# Patient Record
Sex: FEMALE | Race: WHITE | NOT HISPANIC OR LATINO | Employment: FULL TIME | URBAN - METROPOLITAN AREA
[De-identification: names, ages, dates, MRNs, and addresses within clinical notes are randomized per-mention and may not be internally consistent; named-entity substitution may affect disease eponyms.]

---

## 2017-08-13 ENCOUNTER — OFFICE VISIT (OUTPATIENT)
Dept: URGENT CARE | Facility: CLINIC | Age: 42
End: 2017-08-13
Payer: MEDICAID

## 2017-08-13 VITALS
OXYGEN SATURATION: 98 % | WEIGHT: 210 LBS | DIASTOLIC BLOOD PRESSURE: 92 MMHG | TEMPERATURE: 97 F | RESPIRATION RATE: 16 BRPM | HEART RATE: 93 BPM | BODY MASS INDEX: 32.96 KG/M2 | HEIGHT: 67 IN | SYSTOLIC BLOOD PRESSURE: 133 MMHG

## 2017-08-13 DIAGNOSIS — N30.00 ACUTE CYSTITIS WITHOUT HEMATURIA: Primary | ICD-10-CM

## 2017-08-13 DIAGNOSIS — R30.0 DYSURIA: ICD-10-CM

## 2017-08-13 LAB
BILIRUB UR QL STRIP: NEGATIVE
GLUCOSE UR QL STRIP: NEGATIVE
KETONES UR QL STRIP: NEGATIVE
LEUKOCYTE ESTERASE UR QL STRIP: POSITIVE
PH, POC UA: ABNORMAL
POC BLOOD, URINE: NEGATIVE
POC NITRATES, URINE: NEGATIVE
PROT UR QL STRIP: NEGATIVE
SP GR UR STRIP: 1.02 (ref 1–1.03)
UROBILINOGEN UR STRIP-ACNC: ABNORMAL (ref 0.1–1.1)

## 2017-08-13 PROCEDURE — 3008F BODY MASS INDEX DOCD: CPT | Mod: S$GLB,,, | Performed by: INTERNAL MEDICINE

## 2017-08-13 PROCEDURE — 99203 OFFICE O/P NEW LOW 30 MIN: CPT | Mod: 25,S$GLB,, | Performed by: INTERNAL MEDICINE

## 2017-08-13 PROCEDURE — 81003 URINALYSIS AUTO W/O SCOPE: CPT | Mod: QW,S$GLB,, | Performed by: INTERNAL MEDICINE

## 2017-08-13 RX ORDER — SULFAMETHOXAZOLE AND TRIMETHOPRIM 800; 160 MG/1; MG/1
1 TABLET ORAL 2 TIMES DAILY
Qty: 14 TABLET | Refills: 0 | Status: SHIPPED | OUTPATIENT
Start: 2017-08-13 | End: 2017-08-20

## 2017-08-13 RX ORDER — PHENAZOPYRIDINE HYDROCHLORIDE 100 MG/1
100 TABLET, FILM COATED ORAL
Qty: 10 TABLET | Refills: 0 | Status: SHIPPED | OUTPATIENT
Start: 2017-08-13 | End: 2017-08-16

## 2017-08-13 NOTE — PROGRESS NOTES
"Subjective:       Patient ID: Imelda Kimble is a 41 y.o. female.    Vitals:  height is 5' 7" (1.702 m) and weight is 95.3 kg (210 lb). Her oral temperature is 97.3 °F (36.3 °C). Her blood pressure is 133/92 (abnormal) and her pulse is 93. Her respiration is 16 and oxygen saturation is 98%.     Chief Complaint: Dysuria    Dysuria    This is a recurrent problem. The current episode started today. The problem occurs every urination. The problem has been unchanged. The quality of the pain is described as burning. The pain is at a severity of 5/10. The pain is moderate. There has been no fever. She is sexually active. There is no history of pyelonephritis. Associated symptoms include flank pain, frequency and urgency. Pertinent negatives include no chills, hematuria, nausea or vomiting. She has tried nothing for the symptoms. The treatment provided no relief.     Review of Systems   Constitution: Negative for chills and fever.   Musculoskeletal: Negative for back pain.   Gastrointestinal: Negative for abdominal pain, nausea and vomiting.   Genitourinary: Positive for flank pain, frequency, pelvic pain and urgency. Negative for dysuria, genital sores, hematuria, missed menses and non-menstrual bleeding.       Objective:      Physical Exam   Constitutional: She is oriented to person, place, and time. She appears well-developed and well-nourished. She is cooperative.  Non-toxic appearance. She does not appear ill. No distress.   HENT:   Head: Normocephalic and atraumatic.   Right Ear: Hearing, tympanic membrane, external ear and ear canal normal.   Left Ear: Hearing, tympanic membrane, external ear and ear canal normal.   Nose: Nose normal. No mucosal edema, rhinorrhea or nasal deformity. No epistaxis. Right sinus exhibits no maxillary sinus tenderness and no frontal sinus tenderness. Left sinus exhibits no maxillary sinus tenderness and no frontal sinus tenderness.   Mouth/Throat: Uvula is midline, oropharynx is clear and " moist and mucous membranes are normal. No trismus in the jaw. Normal dentition. No uvula swelling. No posterior oropharyngeal erythema.   Eyes: Conjunctivae and lids are normal. Right eye exhibits no discharge. Left eye exhibits no discharge. No scleral icterus.   Sclera clear bilat   Neck: Trachea normal, normal range of motion, full passive range of motion without pain and phonation normal. Neck supple.   Cardiovascular: Normal rate, regular rhythm, normal heart sounds, intact distal pulses and normal pulses.    Pulmonary/Chest: Effort normal and breath sounds normal. No respiratory distress.   Abdominal: Soft. Normal appearance and bowel sounds are normal. She exhibits no distension, no pulsatile midline mass and no mass. There is tenderness. No hernia.       Musculoskeletal: Normal range of motion. She exhibits no edema or deformity.   Neurological: She is alert and oriented to person, place, and time. She exhibits normal muscle tone. Coordination normal.   Skin: Skin is warm, dry and intact. She is not diaphoretic. No pallor.   Psychiatric: She has a normal mood and affect. Her speech is normal and behavior is normal. Judgment and thought content normal. Cognition and memory are normal.   Nursing note and vitals reviewed.      Assessment:       1. Acute cystitis without hematuria    2. Dysuria        Plan:         Acute cystitis without hematuria  -     sulfamethoxazole-trimethoprim 800-160mg (BACTRIM DS) 800-160 mg Tab; Take 1 tablet by mouth 2 (two) times daily.  Dispense: 14 tablet; Refill: 0  -     phenazopyridine (PYRIDIUM) 100 MG tablet; Take 1 tablet (100 mg total) by mouth 3 (three) times daily with meals.  Dispense: 10 tablet; Refill: 0  -     POCT Urinalysis, Dipstick, Automated, W/O Scope    Dysuria  -     sulfamethoxazole-trimethoprim 800-160mg (BACTRIM DS) 800-160 mg Tab; Take 1 tablet by mouth 2 (two) times daily.  Dispense: 14 tablet; Refill: 0  -     phenazopyridine (PYRIDIUM) 100 MG tablet;  Take 1 tablet (100 mg total) by mouth 3 (three) times daily with meals.  Dispense: 10 tablet; Refill: 0  -     POCT Urinalysis, Dipstick, Automated, W/O Scope      Take meds

## 2017-08-13 NOTE — PATIENT INSTRUCTIONS
"  Bladder Infection, Female (Adult)    Urine is normally doesn't have any bacteria in it. But bacteria can get into the urinary tract from the skin around the rectum. Or they can travel in the blood from elsewhere in the body. Once they are in your urinary tract, they can cause infection in the urethra (urethritis), the bladder (cystitis), or the kidneys (pyelonephritis).  The most common place for an infection is in the bladder. This is called a bladder infection. This is one of the most common infections in women. Most bladder infections are easily treated. They are not serious unless the infection spreads to the kidney.  The phrases "bladder infection," "UTI," and "cystitis" are often used to describe the same thing. But they are not always the same. Cystitis is an inflammation of the bladder. The most common cause of cystitis is an infection.  Symptoms  The infection causes inflammation in the urethra and bladder. This causes many of the symptoms. The most common symptoms of a bladder infection are:  · Pain or burning when urinating  · Having to urinate more often than usual  · Urgent need to urinate  · Only a small amount of urine comes out  · Blood in urine  · Abdominal discomfort. This is usually in the lower abdomen above the pubic bone.  · Cloudy urine  · Strong- or bad-smelling urine  · Unable to urinate (urinary retention)  · Unable to hold urine in (urinary incontinence)  · Fever  · Loss of appetite  · Confusion (in older adults)  Causes  Bladder infections are not contagious. You can't get one from someone else, from a toilet seat, or from sharing a bath.  The most common cause of bladder infections is bacteria from the bowels. The bacteria get onto the skin around the opening of the urethra. From there, they can get into the urine and travel up to the bladder, causing inflammation and infection. This usually happens because of:  · Wiping improperly after urinating. Always wipe from front to " back.  · Bowel incontinence  · Pregnancy  · Procedures such as having a catheter inserted  · Older age  · Not emptying your bladder. This can allow bacteria a chance to grow in your urine.  · Dehydration  · Constipation  · Sex  · Use of a diaphragm for birth control   Treatment  Bladder infections are diagnosed by a urine test. They are treated with antibiotics and usually clear up quickly without complications. Treatment helps prevent a more serious kidney infection.  Medicines  Medicines can help in the treatment of a bladder infection:  · Take antibiotics until they are used up, even if you feel better. It is important to finish them to make sure the infection has cleared.  · You can use acetaminophen or ibuprofen for pain, fever, or discomfort, unless another medicine was prescribed. If you have chronic liver or kidney disease, talk with your healthcare provider before using these medicines. Also talk with your provider if you've ever had a stomach ulcer or gastrointestinal bleeding, or are taking blood-thinner medicines.  · If you are given phenazopydridine to reduce burning with urination, it will cause your urine to become a bright orange color. This can stain clothing.  Care and prevention  These self-care steps can help prevent future infections:  · Drink plenty of fluids to prevent dehydration and flush out your bladder. Do this unless you must restrict fluids for other health reasons, or your doctor told you not to.  · Proper cleaning after going to the bathroom is important. Wipe from front to back after using the toilet to prevent the spread of bacteria.  · Urinate more often. Don't try to hold urine in for a long time.  · Wear loose-fitting clothes and cotton underwear. Avoid tight-fitting pants.  · Improve your diet and prevent constipation. Eat more fresh fruit and vegetables, and fiber, and less junk and fatty foods.  · Avoid sex until your symptoms are gone.  · Avoid caffeine, alcohol, and spicy  foods. These can irritate your bladder.  · Urinate right after intercourse to flush out your bladder.  · If you use birth control pills and have frequent bladder infections, discuss it with your doctor.  Follow-up care  Call your healthcare provider if all symptoms are not gone after 3 days of treatment. This is especially important if you have repeat infections.  If a culture was done, you will be told if your treatment needs to be changed. If directed, you can call to find out the results.  If X-rays were done, you will be told if the results will affect your treatment.  Call 911  Call 911 if any of the following occur:  · Trouble breathing  · Hard to wake up or confusion  · Fainting or loss of consciousness  · Rapid heart rate  When to seek medical advice  Call your healthcare provider right away if any of these occur:  · Fever of 100.4ºF (38.0ºC) or higher, or as directed by your healthcare provider  · Symptoms are not better by the third day of treatment  · Back or belly (abdominal) pain that gets worse  · Repeated vomiting, or unable to keep medicine down  · Weakness or dizziness  · Vaginal discharge  · Pain, redness, or swelling in the outer vaginal area (labia)  Date Last Reviewed: 10/1/2016  © 5373-6075 [employer]. [address] [place] [address], [place] [address]. All rights reserved. This information is not intended as a substitute for professional medical care. Always follow your healthcare professional's instructions.  *Urinary Tract Infections*  1) No Tub Bathes.  2) Urinate after [place](Adults).  3)No Fizzy drinks/Soda drinks.  4)Wipe From front to Back.  5)Wear only Cotton Underwear.  6) Take the antibiotic you were given until it is all gone and drink a lot of fluids for 5 to 7 days to Flush Your Kidneys. Studies Show that cranberry Juice does not cure a UTI nor drinking it daily or taking Cranberry tablets will prevent a UTI.  Please return here or go to the Emergency Department for any  concerns or worsening of condition.  If you were prescribed antibiotics, please take them to completion.  If you were prescribed a narcotic medication, do not drive or operate heavy equipment or machinery while taking these medications.  Please follow up with your primary care doctor or specialist as needed.    If you  smoke, please stop smoking.    Cultured urine

## 2018-05-28 ENCOUNTER — OFFICE VISIT (OUTPATIENT)
Dept: URGENT CARE | Facility: CLINIC | Age: 43
End: 2018-05-28
Payer: MEDICAID

## 2018-05-28 VITALS
TEMPERATURE: 97 F | DIASTOLIC BLOOD PRESSURE: 95 MMHG | HEIGHT: 67 IN | HEART RATE: 78 BPM | WEIGHT: 224 LBS | BODY MASS INDEX: 35.16 KG/M2 | SYSTOLIC BLOOD PRESSURE: 148 MMHG | OXYGEN SATURATION: 100 %

## 2018-05-28 DIAGNOSIS — J01.00 ACUTE MAXILLARY SINUSITIS, RECURRENCE NOT SPECIFIED: Primary | ICD-10-CM

## 2018-05-28 PROCEDURE — 99214 OFFICE O/P EST MOD 30 MIN: CPT | Mod: S$GLB,,, | Performed by: PHYSICIAN ASSISTANT

## 2018-05-28 RX ORDER — DEXAMETHASONE SODIUM PHOSPHATE 100 MG/10ML
8 INJECTION INTRAMUSCULAR; INTRAVENOUS
Status: COMPLETED | OUTPATIENT
Start: 2018-05-28 | End: 2018-05-28

## 2018-05-28 RX ORDER — FLUTICASONE PROPIONATE 50 MCG
1 SPRAY, SUSPENSION (ML) NASAL DAILY
Qty: 16 G | Refills: 0 | Status: SHIPPED | OUTPATIENT
Start: 2018-05-28

## 2018-05-28 RX ORDER — AMOXICILLIN AND CLAVULANATE POTASSIUM 875; 125 MG/1; MG/1
1 TABLET, FILM COATED ORAL 2 TIMES DAILY
Qty: 20 TABLET | Refills: 0 | Status: SHIPPED | OUTPATIENT
Start: 2018-05-28 | End: 2018-06-07

## 2018-05-28 RX ORDER — PROMETHAZINE HYDROCHLORIDE AND PHENYLEPHRINE HYDROCHLORIDE 6.25; 5 MG/5ML; MG/5ML
5 SYRUP ORAL 4 TIMES DAILY PRN
Qty: 118 ML | Refills: 0 | Status: SHIPPED | OUTPATIENT
Start: 2018-05-28 | End: 2018-06-07

## 2018-05-28 RX ADMIN — DEXAMETHASONE SODIUM PHOSPHATE 8 MG: 100 INJECTION INTRAMUSCULAR; INTRAVENOUS at 11:05

## 2018-05-28 NOTE — PATIENT INSTRUCTIONS
· Follow up with your primary care in 2-5 days if symptoms have not improved, or you may return here.  · If you were referred to a specialist, please follow up with that specialty.  · If you were prescribed antibiotics, please take them to completion.  · If you were prescribed a narcotic or any medication with sedative effects, do not drive or operate heavy equipment or machinery while taking these medications.  · You must understand that you have received treatment at an Urgent Care facility only, and that you may be released before all of your medical problems are known or treated. Urgent Care facilities are not equipped to handle life threatening emergencies. It is recommended that you go to an Emergency Department for further evaluation of worsening or concerning symptoms, or possibly life threatening conditions as discussed.                                        If you  smoke, please stop smoking            Symptomatic treatment:    Alternate tylenol and motrin every 4-6 hours  salt water gargles  Cold-eeze helps to reduce the duration of sore throat symptoms  Cepachol helps to numb the discomfort  Chloroseptic spray  Nasal saline spray reduces inflammation and dryness  Warm face compresses as often as you can  Vicks vapor rub at night  Flonase OTC or Nasacort OTC  Simple foods like chicken noodle soup help  Mucinex DM or use Coricidin HBP if you have hypertension  Zyrtec/Claritin/Xyzal during the day and Benadryl at night may help if allergy component   Pedialyte helps with dehydration if lacking appetite  Rest as much as you can        Sinusitis (Antibiotic Treatment)    The sinuses are air-filled spaces within the bones of the face. They connect to the inside of the nose. Sinusitis is an inflammation of the tissue lining the sinus cavity. Sinus inflammation can occur during a cold. It can also be due to allergies to pollens and other particles in the air. Sinusitis can cause symptoms of sinus congestion and  fullness. A sinus infection causes fever, headache and facial pain. There is often green or yellow drainage from the nose or into the back of the throat (post-nasal drip). You have been given antibiotics to treat this condition.  Home care:  · Take the full course of antibiotics as instructed. Do not stop taking them, even if you feel better.  · Drink plenty of water, hot tea, and other liquids. This may help thin mucus. It also may promote sinus drainage.  · Heat may help soothe painful areas of the face. Use a towel soaked in hot water. Or,  the shower and direct the hot spray onto your face. Using a vaporizer along with a menthol rub at night may also help.   · An expectorant containing guaifenesin may help thin the mucus and promote drainage from the sinuses.  · Over-the-counter decongestants may be used unless a similar medicine was prescribed. Nasal sprays work the fastest. Use one that contains phenylephrine or oxymetazoline. First blow the nose gently. Then use the spray. Do not use these medicines more often than directed on the label or symptoms may get worse. You may also use tablets containing pseudoephedrine. Avoid products that combine ingredients, because side effects may be increased. Read labels. You can also ask the pharmacist for help. (NOTE: Persons with high blood pressure should not use decongestants. They can raise blood pressure.)  · Over-the-counter antihistamines may help if allergies contributed to your sinusitis.    · Do not use nasal rinses or irrigation during an acute sinus infection, unless told to by your health care provider. Rinsing may spread the infection to other sinuses.  · Use acetaminophen or ibuprofen to control pain, unless another pain medicine was prescribed. (If you have chronic liver or kidney disease or ever had a stomach ulcer, talk with your doctor before using these medicines. Aspirin should never be used in anyone under 18 years of age who is ill with a  fever. It may cause severe liver damage.)  · Don't smoke. This can worsen symptoms.  Follow-up care  Follow up with your healthcare provider or our staff if you are not improving within the next week.  When to seek medical advice  Call your healthcare provider if any of these occur:  · Facial pain or headache becoming more severe  · Stiff neck  · Unusual drowsiness or confusion  · Swelling of the forehead or eyelids  · Vision problems, including blurred or double vision  · Fever of 100.4ºF (38ºC) or higher, or as directed by your healthcare provider  · Seizure  · Breathing problems  · Symptoms not resolving within 10 days  Date Last Reviewed: 4/13/2015  © 8083-2441 [employer]. [address] [place] [address], [place] [address]. All rights reserved. This information is not intended as a substitute for professional medical care. Always follow your healthcare professional's instructions.

## 2018-05-28 NOTE — PROGRESS NOTES
"Subjective:       Patient ID: Imelda Kimble is a 42 y.o. female.    Vitals:  height is 5' 7" (1.702 m) and weight is 101.6 kg (224 lb). Her oral temperature is 97.1 °F (36.2 °C). Her blood pressure is 148/95 (abnormal) and her pulse is 78. Her oxygen saturation is 100%.     Chief Complaint: Sinus Problem    Sinus Problem   This is a new problem. Episode onset: 5 days ago. The problem has been waxing and waning since onset. There has been no fever. Her pain is at a severity of 5/10 (left ear pain ). The pain is mild. Associated symptoms include ear pain, headaches and sinus pressure. Pertinent negatives include no chills, congestion, coughing, hoarse voice, shortness of breath or sore throat. Treatments tried: allergy medicine, ibuprofen. The treatment provided no relief.     Review of Systems   Constitution: Negative for chills, fever and malaise/fatigue.   HENT: Positive for ear pain and sinus pressure. Negative for congestion, hoarse voice and sore throat.    Eyes: Negative for discharge and redness.   Cardiovascular: Negative for chest pain, dyspnea on exertion and leg swelling.   Respiratory: Negative for cough, shortness of breath, sputum production and wheezing.    Musculoskeletal: Negative for myalgias.   Gastrointestinal: Negative for abdominal pain and nausea.   Neurological: Positive for headaches.       Objective:      Physical Exam   Constitutional: She is oriented to person, place, and time. She appears well-developed and well-nourished. She is cooperative.  Non-toxic appearance. She does not appear ill. No distress.   HENT:   Head: Normocephalic and atraumatic.   Right Ear: Hearing, external ear and ear canal normal. A middle ear effusion (serous) is present.   Left Ear: Hearing, external ear and ear canal normal. A middle ear effusion (serous) is present.   Nose: Mucosal edema present. No rhinorrhea or nasal deformity. No epistaxis. Right sinus exhibits no maxillary sinus tenderness and no frontal " sinus tenderness. Left sinus exhibits maxillary sinus tenderness. Left sinus exhibits no frontal sinus tenderness.   Mouth/Throat: Uvula is midline, oropharynx is clear and moist and mucous membranes are normal. No trismus in the jaw. Normal dentition. No uvula swelling. No oropharyngeal exudate, posterior oropharyngeal edema or posterior oropharyngeal erythema.   Eyes: Conjunctivae and lids are normal. No scleral icterus.   Sclera clear bilat   Neck: Trachea normal, full passive range of motion without pain and phonation normal. Neck supple.   Cardiovascular: Normal rate, regular rhythm, normal heart sounds, intact distal pulses and normal pulses.    Pulmonary/Chest: Effort normal and breath sounds normal. No respiratory distress. She has no decreased breath sounds. She has no wheezes. She has no rhonchi. She has no rales.   Abdominal: Soft. Normal appearance and bowel sounds are normal. She exhibits no distension. There is no tenderness.   Musculoskeletal: Normal range of motion. She exhibits no edema or deformity.   Neurological: She is alert and oriented to person, place, and time. She exhibits normal muscle tone. Coordination normal.   Skin: Skin is warm, dry and intact. She is not diaphoretic. No pallor.   Psychiatric: She has a normal mood and affect. Her speech is normal and behavior is normal. Judgment and thought content normal. Cognition and memory are normal.   Nursing note and vitals reviewed.      Assessment:       1. Acute maxillary sinusitis, recurrence not specified        Plan:         Acute maxillary sinusitis, recurrence not specified  -     amoxicillin-clavulanate 875-125mg (AUGMENTIN) 875-125 mg per tablet; Take 1 tablet by mouth 2 (two) times daily.  Dispense: 20 tablet; Refill: 0  -     dexamethasone injection 8 mg; Inject 0.8 mLs (8 mg total) into the muscle one time.  -     fluticasone (FLONASE) 50 mcg/actuation nasal spray; 1 spray (50 mcg total) by Each Nare route once daily.  Dispense:  16 g; Refill: 0  -     promethazine-phenylephrine (PROMETHAZINE VC) 6.25-5 mg/5 mL Syrp; Take 5 mLs by mouth 4 (four) times daily as needed.  Dispense: 118 mL; Refill: 0      Patient Instructions   · Follow up with your primary care in 2-5 days if symptoms have not improved, or you may return here.  · If you were referred to a specialist, please follow up with that specialty.  · If you were prescribed antibiotics, please take them to completion.  · If you were prescribed a narcotic or any medication with sedative effects, do not drive or operate heavy equipment or machinery while taking these medications.  · You must understand that you have received treatment at an Urgent Care facility only, and that you may be released before all of your medical problems are known or treated. Urgent Care facilities are not equipped to handle life threatening emergencies. It is recommended that you go to an Emergency Department for further evaluation of worsening or concerning symptoms, or possibly life threatening conditions as discussed.                                        If you  smoke, please stop smoking            Symptomatic treatment:    Alternate tylenol and motrin every 4-6 hours  salt water gargles  Cold-eeze helps to reduce the duration of sore throat symptoms  Cepachol helps to numb the discomfort  Chloroseptic spray  Nasal saline spray reduces inflammation and dryness  Warm face compresses as often as you can  Vicks vapor rub at night  Flonase OTC or Nasacort OTC  Simple foods like chicken noodle soup help  Mucinex DM or use Coricidin HBP if you have hypertension  Zyrtec/Claritin/Xyzal during the day and Benadryl at night may help if allergy component   Pedialyte helps with dehydration if lacking appetite  Rest as much as you can        Sinusitis (Antibiotic Treatment)    The sinuses are air-filled spaces within the bones of the face. They connect to the inside of the nose. Sinusitis is an inflammation of the  tissue lining the sinus cavity. Sinus inflammation can occur during a cold. It can also be due to allergies to pollens and other particles in the air. Sinusitis can cause symptoms of sinus congestion and fullness. A sinus infection causes fever, headache and facial pain. There is often green or yellow drainage from the nose or into the back of the throat (post-nasal drip). You have been given antibiotics to treat this condition.  Home care:  · Take the full course of antibiotics as instructed. Do not stop taking them, even if you feel better.  · Drink plenty of water, hot tea, and other liquids. This may help thin mucus. It also may promote sinus drainage.  · Heat may help soothe painful areas of the face. Use a towel soaked in hot water. Or,  the shower and direct the hot spray onto your face. Using a vaporizer along with a menthol rub at night may also help.   · An expectorant containing guaifenesin may help thin the mucus and promote drainage from the sinuses.  · Over-the-counter decongestants may be used unless a similar medicine was prescribed. Nasal sprays work the fastest. Use one that contains phenylephrine or oxymetazoline. First blow the nose gently. Then use the spray. Do not use these medicines more often than directed on the label or symptoms may get worse. You may also use tablets containing pseudoephedrine. Avoid products that combine ingredients, because side effects may be increased. Read labels. You can also ask the pharmacist for help. (NOTE: Persons with high blood pressure should not use decongestants. They can raise blood pressure.)  · Over-the-counter antihistamines may help if allergies contributed to your sinusitis.    · Do not use nasal rinses or irrigation during an acute sinus infection, unless told to by your health care provider. Rinsing may spread the infection to other sinuses.  · Use acetaminophen or ibuprofen to control pain, unless another pain medicine was prescribed. (If  you have chronic liver or kidney disease or ever had a stomach ulcer, talk with your doctor before using these medicines. Aspirin should never be used in anyone under 18 years of age who is ill with a fever. It may cause severe liver damage.)  · Don't smoke. This can worsen symptoms.  Follow-up care  Follow up with your healthcare provider or our staff if you are not improving within the next week.  When to seek medical advice  Call your healthcare provider if any of these occur:  · Facial pain or headache becoming more severe  · Stiff neck  · Unusual drowsiness or confusion  · Swelling of the forehead or eyelids  · Vision problems, including blurred or double vision  · Fever of 100.4ºF (38ºC) or higher, or as directed by your healthcare provider  · Seizure  · Breathing problems  · Symptoms not resolving within 10 days  Date Last Reviewed: 4/13/2015  © 2962-5326 [employer] [address] [place] [address], [place] [address]. All rights reserved. This information is not intended as a substitute for professional medical care. Always follow your healthcare professional's instructions.

## 2018-06-12 ENCOUNTER — OFFICE VISIT (OUTPATIENT)
Dept: URGENT CARE | Facility: CLINIC | Age: 43
End: 2018-06-12
Payer: MEDICAID

## 2018-06-12 VITALS
RESPIRATION RATE: 20 BRPM | WEIGHT: 222 LBS | HEIGHT: 67 IN | TEMPERATURE: 99 F | BODY MASS INDEX: 34.84 KG/M2 | DIASTOLIC BLOOD PRESSURE: 101 MMHG | HEART RATE: 82 BPM | SYSTOLIC BLOOD PRESSURE: 145 MMHG | OXYGEN SATURATION: 99 %

## 2018-06-12 DIAGNOSIS — H65.92 LEFT OTITIS MEDIA WITH EFFUSION: Primary | ICD-10-CM

## 2018-06-12 DIAGNOSIS — J01.10 ACUTE FRONTAL SINUSITIS, RECURRENCE NOT SPECIFIED: ICD-10-CM

## 2018-06-12 PROCEDURE — 99213 OFFICE O/P EST LOW 20 MIN: CPT | Mod: S$GLB,,, | Performed by: NURSE PRACTITIONER

## 2018-06-12 RX ORDER — CEFDINIR 300 MG/1
300 CAPSULE ORAL EVERY 12 HOURS
Qty: 10 CAPSULE | Refills: 0 | Status: SHIPPED | OUTPATIENT
Start: 2018-06-12 | End: 2018-06-17

## 2018-06-12 NOTE — PATIENT INSTRUCTIONS
Sinus Headache    The sinuses are air-filled spaces within the bones of the face. They connect to the inside of the nose. Sinusitis is an inflammation of the tissue lining the sinus cavity. Sinus inflammation can occur during a cold or hay fever (allergies to pollens and other particles in the air) and cause symptoms of sinus congestion and fullness and perhaps a low-grade fever. An infection is usually present when there is also facial pain or headache and green or yellow drainage from the nose or into the back of the throat (postnasal drip). Antibiotics are often prescribed to treat this condition.  Sinus headache may cause pain in different places, depending on which sinuses are infected. There may be pain in the temples, forehead, top of the head, behind or around the eye, across the cheekbone, or into the upper teeth.  You may find that changing your position, sitting upright or lying down, will bring some relief.  Home care  The following guidelines will help you care for yourself at home:  · Drink plenty of water, hot tea, and other liquids to stay well hydrated. This thins the mucus and helps your sinuses drain.  · Apply heat to the painful areas of the face. Use a towel soaked in hot water. Or  the shower with the hot spray on your face. This is a good way to inhale warm water vapor and get heat on your face at the same time. Cover your mouth and nose with your hands so you can still breathe as you do this.  · Use a cool mist vaporizer at night. Suck on peppermint, menthol, or eucalyptus hard candies during the day.  · An expectorant containing guaifenesin helps thin the mucus. It also helps your sinuses drain.  · You may use over-the-counter decongestants unless a similar medicine was prescribed. Nasal sprays or drops work the fastest. Use one that contains phenylephrine or oxymetazoline. First blow your nose gently to remove mucus. Then apply the spray or drops. Don't use decongestant nasal  sprays or drops more often than the label says or for more than 3 days. This can make symptoms worse. Nasal sprays or drops prescribed by your doctor typically do not have these limits. Check with your doctor or pharmacist. You may also use oral tablets containing pseudoephedrine. Side effects from oral decongestants tend to be worse than with nasal sprays or drops, and may keep you from using them. Many sinus remedies combine ingredients, which may increase side effects. Also, if you are taking a combination medicine with another medicine, be sure you are not taking a double dose of anything by mistake. Read the labels or ask the pharmacist for help. Talk with your doctor before using decongestants if you have high blood pressure, heart disease, glaucoma, or prostate trouble.  · Antihistamines may help if allergies are causing your sinusitis. You can get chlorpheniramine and diphenhydramine over the counter, but these can cause drowsiness. Don't use these if you have glaucoma or if you are a man with trouble urinating due to an enlarged prostate. Over-the-counter antihistamines containing loratidine and cetirizine cause less drowsiness and may be a better choice for daytime use.  · When allergies cause your sinusitis, a saline nasal rinse may give relief. A saline nasal rinse reduces swelling and clears excess mucus. This allows sinuses to drain. Prepackaged kits are available at most drugstores. These contain premixed salt packets and an irrigation device. If antibiotics have been prescribed to treat an acute sinus infection, talk with your doctor before using a nasal rinse to be sure it is safe for you.  · You may use over-the-counter medicine to control pain and fever, unless another pain medicine was prescribed. Talk with your doctor before using acetaminophen or ibuprofen if you have chronic liver or kidney disease. Also talk with your doctor if you have ever had a stomach ulcer. Aspirin should never be used  in anyone under 18 years of age who has a fever. It may cause a life-threatening condition called Reye syndrome.  · If antibiotics were given, finish all of them, even if you are feeling better after a few days.  Follow-up care  Follow up with your healthcare provider, or as advised if your symptoms aren't better in 1 week.  Call 911  Call 911 if any of these occur:  · Unusual drowsiness or confusion  · Swelling of the forehead or eyelids  · Vision problems including blurred or double vision  · Seizure  When to seek medical advice  Call your healthcare provider right away if any of these occur:  · Facial pain or headache becomes more severe  · Stiff neck  · Fever over 100.4º F (38.0º C) for more than 3 days on antibiotics  · Bleeding from the nose or throat  Date Last Reviewed: 10/1/2016  © 2025-6803 [employer]. [address] [place] [address]. All rights reserved. This information is not intended as a substitute for professional medical care. Always follow your healthcare professional's instructions.      Upper Respiratory Infections    The following information is provided to help you in treating upper respiratory infections.    Decongestant Nasal Sprays  Over-the-counter decongestant nasal spray such as Afrin, may be helpful as an initial step in treating upper respiratory infections. This spray can be used for up to approximately 3 to 5 days and is used no more than twice per day. Topical nasal decongestant spray for longer than 5 days will result in a physical addiction, in which the nasal lining will become significantly swollen and irritated until the spray is used again.     Nasal Saline  Nasal saline is available over the counter. There are several different commercial preparations such as Ocean spray and Ayr spray. There is no limit on the use of Nasal saline. Saline is used by snorting the mist up into the nose then later gently blowing the nose to get rid of any secretions that  it has loosened.    Nasal Steroids  Nasal steroid medications such as Flonase are useful for upper respiratory infections, allergies, and sensitivities to airborne irritants. Unfortunately, they do not begin to work for 1-2 days, and they do not reach their maximum benefit for approximately 2-3 weeks. Initial therapy is typically 2 puffs per nostril twice per day. This should be used for only a few days, then the maintenance dosage is one or two puffs per nostril once per day. This can be done at any time of the day. The most effective way to use any nasal medication is to look down at your toes when spraying it in. Aim slightly away from the septum (dividing plate between the nostrils), and gently inhale. This ensures that the spray will go into the sinus cavities and not straight up into the nose. A good way to avoid spraying onto the septum is to use the right hand to spray into the left nostril, and vice versa for the right nostril. Occasionally, nasal steroids can increase the risk of nosebleeds, but in general they are very well tolerated and effective medications.    Antihistamines  Antihistamines are available both over-the-counter and as a prescription. There are also various decongestant and antihistamine combinations available such as Claritin, Allegra, and Zyrtec. It is best to take any antihistamine-decongestant combination in the morning to avoid insomnia. Zyrtec should probably be taken at night, in order to reduce the chance of sleepiness during the daytime. If there is a significant infection present and secretions are already thickened, it is recommended to discontinue antihistamines and use a mucous thinner/decongestant combination.    Mucous Thinners and Decongestants  Mucous thinners and decongestants are used to shrink down the tissues and promote sinus drainage. There are multiple prescription and over-the-counter varieties available. A mucous thinner will tend to be drying unless you are also  drinking plenty of water when taking these. If you have high blood pressure, it is very important to monitor your pressure while on decongestants. The mucous thinner/decongestant combinations are typically given twice per day. However, some people will be unable to tolerate these at night and should only take them once per day.  Oral Steroids  Oral steroids can be used with more sever infections. Often, they are the only medications that will reduce the symptoms of pressure and allow the nasal sinuses to drain. These are best taken on a full stomach and earlier in the day is better. They may give you some irritability, stomach upset, or hyperactivity. This can also interfere with sleep. A person who has high blood pressure or diabetes needs to be very careful to monitor their pressure or blood glucose while taking steroids. Steroids can have multiple side effects when taken long-term, but short-term doses are very well tolerated and extremely effective in controlling the symptoms associated with acute and chronic sinus infections, severe allergies, or nasal polyps. The only significant side effect of note with oral steroids is the extraordinarily uncommon occurrence of damage to the hip cartilage, which is very rare and is usually associated with long-term usage of steroids. The use of steroids for greater than approximately seven days requires a tapering down in order to discontinue them. You should not abruptly stop your steroid if you have been taking the same dose for greater than one week.    Antibiotic Treatment  Finally, when all of these other measures have failed, and a bacterial infection is present, an antibiotic will be prescribed. The most common symptoms of acute sinusitis of a bacterial nature are pain, pressure, and thick and colored nasal drainage. However, not all colored drainage means that there is a bacterial infection present. According to the Center for Disease Control, only 2% of colds will  progress to result in bacterial sinusitis. Most upper respiratory infections should NOT be treated with antibiotics. Antibiotics should be reserved for upper respiratory infections which last longer than 10 days, or which worsen after 4 or 5 days of treatment. The use of antibiotics for nonbacterial upper respiratory infections has resulted in a severe problem with the emergence of bacteria which are resistant to multiple forms of antibiotics, and some bacteria are currently only treatable with intravenous antibiotics.    Body Aches/Pains/Fever  Motrin/advil/ibuprofen- Take Two to Three Tablets(200 mg) three Times a Day for 5 to 7 Days.    Drink Hot Liquids(coffee,WATER,Tea,Hot Chocolate,or Soup) that you put in a Mug place in Microwave for 2.5 to 3 minutes CHANGE THE CUP THAT WAS USED IN THE MICROWAVE SO AS NOT TO BURN YOUR MOUTH,then sniff the steam from the cup and sip the heated liquid TEN TO TWELVE TIMES A DAY for 5 to 7 Days.

## 2018-06-12 NOTE — PROGRESS NOTES
"Subjective:       Patient ID: Imelda Kimble is a 42 y.o. female.    Vitals:  height is 5' 7" (1.702 m) and weight is 100.7 kg (222 lb). Her oral temperature is 98.6 °F (37 °C). Her blood pressure is 145/101 (abnormal) and her pulse is 82. Her respiration is 20 and oxygen saturation is 99%.     Chief Complaint: Otalgia    Patient presents with complaints of left ear pain. Pain is described as pressure.  Treated [facility] May 28th for Sinus infection at which time symptoms improved but continues with pressure and pain in left ear. She currently does not have congestion or rhinorrhea. She does have associated left frontal sinus pressure. Current meds include Flonase and zyrtec daily. Reports chronic sinus problems in the past with previous surgery.  States these symptoms are similar to previous symptoms prior to sinus surgery.       Otalgia    There is pain in the left ear. This is a new problem. The current episode started in the past 7 days. The problem occurs constantly. The problem has been gradually worsening. There has been no fever. The pain is at a severity of 7/10. The pain is moderate. Pertinent negatives include no abdominal pain, coughing, headaches or sore throat. She has tried acetaminophen (advil) for the symptoms. The treatment provided mild relief.     Review of Systems   Constitution: Negative for chills, fever and malaise/fatigue.   HENT: Positive for ear pain. Negative for congestion, hoarse voice and sore throat.    Eyes: Negative for discharge and redness.   Cardiovascular: Negative for chest pain, dyspnea on exertion and leg swelling.   Respiratory: Negative for cough, shortness of breath, sputum production and wheezing.    Musculoskeletal: Negative for myalgias.   Gastrointestinal: Negative for abdominal pain and nausea.   Neurological: Negative for headaches.       Objective:      Physical Exam   Constitutional: She is oriented to person, place, and time. She appears well-developed and " well-nourished. She is cooperative.  Non-toxic appearance. She does not appear ill. No distress.   HENT:   Head: Normocephalic and atraumatic.   Right Ear: Hearing, tympanic membrane and ear canal normal.   Left Ear: Hearing and ear canal normal. A middle ear effusion is present.   Nose: Nose normal. No mucosal edema, rhinorrhea or nasal deformity. No epistaxis. Right sinus exhibits no maxillary sinus tenderness and no frontal sinus tenderness. Left sinus exhibits no maxillary sinus tenderness and no frontal sinus tenderness.   Mouth/Throat: Uvula is midline, oropharynx is clear and moist and mucous membranes are normal. No trismus in the jaw. Normal dentition. No uvula swelling. No posterior oropharyngeal erythema.   Eyes: Conjunctivae and lids are normal. No scleral icterus.   Sclera clear bilat   Neck: Trachea normal, full passive range of motion without pain and phonation normal. Neck supple.   Cardiovascular: Normal rate, regular rhythm, normal heart sounds, intact distal pulses and normal pulses.    Pulmonary/Chest: Effort normal and breath sounds normal. No respiratory distress.   Abdominal: Soft. Normal appearance and bowel sounds are normal. She exhibits no distension. There is no tenderness.   Musculoskeletal: Normal range of motion. She exhibits no edema or deformity.   Neurological: She is alert and oriented to person, place, and time. She exhibits normal muscle tone. Coordination normal.   Skin: Skin is warm, dry and intact. She is not diaphoretic. No pallor.   Psychiatric: She has a normal mood and affect. Her speech is normal and behavior is normal. Judgment and thought content normal. Cognition and memory are normal.   Nursing note and vitals reviewed.      Assessment:       1. Left otitis media with effusion    2. Acute frontal sinusitis, recurrence not specified        Plan:         Left otitis media with effusion  -     cefdinir (OMNICEF) 300 MG capsule; Take 1 capsule (300 mg total) by mouth  every 12 (twelve) hours.  Dispense: 10 capsule; Refill: 0    Acute frontal sinusitis, recurrence not specified  -     cefdinir (OMNICEF) 300 MG capsule; Take 1 capsule (300 mg total) by mouth every 12 (twelve) hours.  Dispense: 10 capsule; Refill: 0    Continue Flonase  OTC Sudafed   Symptomatic Care    Delayed antibiotic prescribing.  Prescription printed.  Pt will begin antibiotic ONLY if symptoms progressively worsen or no improvement.

## 2018-06-16 ENCOUNTER — TELEPHONE (OUTPATIENT)
Dept: URGENT CARE | Facility: CLINIC | Age: 43
End: 2018-06-16

## 2018-09-27 ENCOUNTER — OFFICE VISIT (OUTPATIENT)
Dept: URGENT CARE | Facility: CLINIC | Age: 43
End: 2018-09-27
Payer: MEDICAID

## 2018-09-27 VITALS
OXYGEN SATURATION: 100 % | DIASTOLIC BLOOD PRESSURE: 91 MMHG | TEMPERATURE: 98 F | WEIGHT: 212 LBS | HEIGHT: 67 IN | SYSTOLIC BLOOD PRESSURE: 130 MMHG | RESPIRATION RATE: 18 BRPM | BODY MASS INDEX: 33.27 KG/M2 | HEART RATE: 85 BPM

## 2018-09-27 DIAGNOSIS — J32.9 SINUSITIS, UNSPECIFIED CHRONICITY, UNSPECIFIED LOCATION: Primary | ICD-10-CM

## 2018-09-27 PROCEDURE — 99214 OFFICE O/P EST MOD 30 MIN: CPT | Mod: S$GLB,,, | Performed by: PHYSICIAN ASSISTANT

## 2018-09-27 RX ORDER — CEFDINIR 300 MG/1
300 CAPSULE ORAL 2 TIMES DAILY
Qty: 20 CAPSULE | Refills: 0 | Status: SHIPPED | OUTPATIENT
Start: 2018-09-27 | End: 2018-10-07

## 2018-09-27 RX ORDER — BROMPHENIRAMINE MALEATE, PSEUDOEPHEDRINE HYDROCHLORIDE, AND DEXTROMETHORPHAN HYDROBROMIDE 2; 30; 10 MG/5ML; MG/5ML; MG/5ML
5 SYRUP ORAL EVERY 6 HOURS PRN
Qty: 118 ML | Refills: 0 | Status: SHIPPED | OUTPATIENT
Start: 2018-09-27 | End: 2018-10-07

## 2018-09-27 RX ORDER — DEXAMETHASONE SODIUM PHOSPHATE 100 MG/10ML
7 INJECTION INTRAMUSCULAR; INTRAVENOUS
Status: COMPLETED | OUTPATIENT
Start: 2018-09-27 | End: 2018-09-27

## 2018-09-27 RX ORDER — CETIRIZINE HYDROCHLORIDE 10 MG/1
10 TABLET ORAL DAILY PRN
COMMUNITY

## 2018-09-27 RX ORDER — ACETAMINOPHEN 500 MG
5000 TABLET ORAL DAILY
COMMUNITY

## 2018-09-27 RX ADMIN — DEXAMETHASONE SODIUM PHOSPHATE 7 MG: 100 INJECTION INTRAMUSCULAR; INTRAVENOUS at 06:09

## 2018-09-27 NOTE — PROGRESS NOTES
"Subjective:       Patient ID: Imelda Kimble is a 42 y.o. female.    Vitals:  height is 5' 7" (1.702 m) and weight is 96.2 kg (212 lb). Her oral temperature is 97.6 °F (36.4 °C). Her blood pressure is 130/91 (abnormal) and her pulse is 85. Her respiration is 18 and oxygen saturation is 100%.     Chief Complaint: Sinus Problem    Sinus Problem   This is a new problem. Episode onset: 1 month. The problem has been gradually worsening since onset. There has been no fever. Her pain is at a severity of 8/10. The pain is moderate. Associated symptoms include coughing, ear pain, headaches, shortness of breath, sinus pressure and sneezing. Pertinent negatives include no chills, congestion, hoarse voice, neck pain or sore throat. Treatments tried: Sudafed. The treatment provided mild relief.     Review of Systems   Constitution: Negative for chills, fever and malaise/fatigue.   HENT: Positive for ear pain, sinus pressure and sneezing. Negative for congestion, hoarse voice and sore throat.    Eyes: Negative for discharge and redness.   Cardiovascular: Negative for chest pain, dyspnea on exertion and leg swelling.   Respiratory: Positive for cough and shortness of breath. Negative for sputum production and wheezing.    Musculoskeletal: Negative for myalgias and neck pain.   Gastrointestinal: Negative for abdominal pain and nausea.   Neurological: Positive for headaches.       Objective:      Physical Exam   Constitutional: She is oriented to person, place, and time. She appears well-developed and well-nourished. She is cooperative.  Non-toxic appearance. She does not appear ill. No distress.   HENT:   Head: Normocephalic and atraumatic.   Right Ear: Hearing, external ear and ear canal normal. Tympanic membrane is not erythematous. A middle ear effusion (serous) is present.   Left Ear: Hearing, external ear and ear canal normal. Tympanic membrane is not erythematous. A middle ear effusion (serous) is present.   Nose: Mucosal edema " (mucoid discharge noted) present. No rhinorrhea or nasal deformity. No epistaxis. Right sinus exhibits no maxillary sinus tenderness and no frontal sinus tenderness. Left sinus exhibits no maxillary sinus tenderness and no frontal sinus tenderness.   Mouth/Throat: Uvula is midline, oropharynx is clear and moist and mucous membranes are normal. No trismus in the jaw. Normal dentition. No uvula swelling. No oropharyngeal exudate, posterior oropharyngeal edema or posterior oropharyngeal erythema (pnd present).   Eyes: Conjunctivae and lids are normal. No scleral icterus.   Sclera clear bilat   Neck: Trachea normal, full passive range of motion without pain and phonation normal. Neck supple.   Cardiovascular: Normal rate, regular rhythm, normal heart sounds, intact distal pulses and normal pulses.   Pulmonary/Chest: Effort normal and breath sounds normal. No respiratory distress. She has no decreased breath sounds. She has no wheezes.   Abdominal: Soft. Normal appearance and bowel sounds are normal. She exhibits no distension. There is no tenderness.   Musculoskeletal: Normal range of motion. She exhibits no edema or deformity.   Lymphadenopathy:     She has no cervical adenopathy.   Neurological: She is alert and oriented to person, place, and time. She exhibits normal muscle tone. Coordination normal.   Skin: Skin is warm, dry and intact. She is not diaphoretic. No pallor.   Psychiatric: She has a normal mood and affect. Her speech is normal and behavior is normal. Judgment and thought content normal. Cognition and memory are normal.   Nursing note and vitals reviewed.      Assessment:       1. Sinusitis, unspecified chronicity, unspecified location        Plan:         Sinusitis, unspecified chronicity, unspecified location  -     cefdinir (OMNICEF) 300 MG capsule; Take 1 capsule (300 mg total) by mouth 2 (two) times daily. for 10 days  Dispense: 20 capsule; Refill: 0  -     dexamethasone injection 7 mg; Inject 0.7  mLs (7 mg total) into the muscle one time.  -     brompheniramine-pseudoeph-DM (BROMFED DM) 2-30-10 mg/5 mL Syrp; Take 5 mLs by mouth every 6 (six) hours as needed.  Dispense: 118 mL; Refill: 0      Patient Instructions   · Follow up with your primary care in 2-5 days if symptoms have not improved, or you may return here.  · If you were referred to a specialist, please follow up with that specialty.  · If you were prescribed antibiotics, please take them to completion.  · If you were prescribed a narcotic or any medication with sedative effects, do not drive or operate heavy equipment or machinery while taking these medications.  · You must understand that you have received treatment at an Urgent Care facility only, and that you may be released before all of your medical problems are known or treated. Urgent Care facilities are not equipped to handle life threatening emergencies. It is recommended that you go to an Emergency Department for further evaluation of worsening or concerning symptoms, or possibly life threatening conditions as discussed.                                        If you  smoke, please stop smoking      You have been given a prescription for antibiotics. Your symptoms will likely resolve without antibiotics. It is recommended that you monitor yourself closely for 3-5 days and fill this prescription and start the antibiotic only if signs of infection, as discussed at your visit, are present. It is important to follow these instructions due to the problem of increasing antibiotic resistance.    Symptomatic treatment:    Alternate tylenol and motrin every 4-6 hours  salt water gargles  Cold-eeze helps to reduce the duration of sore throat symptoms  Cepachol helps to numb the discomfort  Chloroseptic spray  Nasal saline spray reduces inflammation and dryness  Warm face compresses as often as you can  Vicks vapor rub at night  Flonase OTC or Nasacort OTC  Simple foods like chicken noodle soup  help  Pedialyte helps with dehydration if lacking appetite  Rest as much as you can      Sinusitis (No Antibiotics)    The sinuses are air-filled spaces within the bones of the face. They connect to the inside of the nose. Sinusitis is an inflammation of the tissue lining the sinus cavity. Sinus inflammation can occur during a cold. It can also be due to allergies to pollens and other particles in the air. It can cause symptoms such as sinus congestion, headache, sore throat, facial swelling and fullness. It may also cause a low-grade fever. No infection is present, and no antibiotic treatment is needed.  Home care  · Drink plenty of water, hot tea, and other liquids. This may help thin mucus. It also may promote sinus drainage.  · Heat may help soothe painful areas of the face. Use a towel soaked in hot water. Or,  the shower and direct the hot spray onto your face. Using a vaporizer along with a menthol rub at night may also help.   · An expectorant containing guaifenesin may help thin the mucus and promote drainage from the sinuses.  · Over-the-counter decongestants may be used unless a similar medicine was prescribed. Nasal sprays work the fastest. Use one that contains phenylephrine or oxymetazoline. First blow the nose gently. Then use the spray. Do not use these medicines more often than directed on the label or symptoms may get worse. You may also use tablets containing pseudoephedrine. Avoid products that combine ingredients, because side effects may be increased. Read labels. You can also ask the pharmacist for help. (NOTE: Persons with high blood pressure should not use decongestants. They can raise blood pressure.)  · Over-the-counter antihistamines may help if allergies contributed to your sinusitis.    · Use acetaminophen or ibuprofen to control pain, unless another pain medicine was prescribed. (If you have chronic liver or kidney disease or ever had a stomach ulcer, talk with your doctor  before using these medicines. Aspirin should never be used in anyone under 18 years of age who is ill with a fever. It may cause severe liver damage.)  · Use nasal rinses or irrigation as instructed by your health care provider.  · Don't smoke. This can worsen symptoms.  Follow-up care  Follow up with your healthcare provider or our staff if you are not improving within the next week.  When to seek medical advice  Call your healthcare provider if any of these occur:  · Green or yellow discharge from the nose or into the throat  · Facial pain or headache becoming more severe  · Stiff neck  · Unusual drowsiness or confusion  · Swelling of the forehead or eyelids  · Vision problems, including blurred or double vision  · Fever of 100.4ºF (38ºC) or higher, or as directed by your healthcare provider  · Seizure  · Breathing problems  · Symptoms not resolving within 10 days  Date Last Reviewed: 4/13/2015  © 5637-9787 [employer]. [address] [place] [address], [place] [address]. All rights reserved. This information is not intended as a substitute for professional medical care. Always follow your healthcare professional's instructions.

## 2018-09-27 NOTE — PATIENT INSTRUCTIONS
· Follow up with your primary care in 2-5 days if symptoms have not improved, or you may return here.  · If you were referred to a specialist, please follow up with that specialty.  · If you were prescribed antibiotics, please take them to completion.  · If you were prescribed a narcotic or any medication with sedative effects, do not drive or operate heavy equipment or machinery while taking these medications.  · You must understand that you have received treatment at an Urgent Care facility only, and that you may be released before all of your medical problems are known or treated. Urgent Care facilities are not equipped to handle life threatening emergencies. It is recommended that you go to an Emergency Department for further evaluation of worsening or concerning symptoms, or possibly life threatening conditions as discussed.                                        If you  smoke, please stop smoking      You have been given a prescription for antibiotics. Your symptoms will likely resolve without antibiotics. It is recommended that you monitor yourself closely for 3-5 days and fill this prescription and start the antibiotic only if signs of infection, as discussed at your visit, are present. It is important to follow these instructions due to the problem of increasing antibiotic resistance.    Symptomatic treatment:    Alternate tylenol and motrin every 4-6 hours  salt water gargles  Cold-eeze helps to reduce the duration of sore throat symptoms  Cepachol helps to numb the discomfort  Chloroseptic spray  Nasal saline spray reduces inflammation and dryness  Warm face compresses as often as you can  Vicks vapor rub at night  Flonase OTC or Nasacort OTC  Simple foods like chicken noodle soup help  Pedialyte helps with dehydration if lacking appetite  Rest as much as you can      Sinusitis (No Antibiotics)    The sinuses are air-filled spaces within the bones of the face. They connect to the inside of the  nose. Sinusitis is an inflammation of the tissue lining the sinus cavity. Sinus inflammation can occur during a cold. It can also be due to allergies to pollens and other particles in the air. It can cause symptoms such as sinus congestion, headache, sore throat, facial swelling and fullness. It may also cause a low-grade fever. No infection is present, and no antibiotic treatment is needed.  Home care  · Drink plenty of water, hot tea, and other liquids. This may help thin mucus. It also may promote sinus drainage.  · Heat may help soothe painful areas of the face. Use a towel soaked in hot water. Or,  the shower and direct the hot spray onto your face. Using a vaporizer along with a menthol rub at night may also help.   · An expectorant containing guaifenesin may help thin the mucus and promote drainage from the sinuses.  · Over-the-counter decongestants may be used unless a similar medicine was prescribed. Nasal sprays work the fastest. Use one that contains phenylephrine or oxymetazoline. First blow the nose gently. Then use the spray. Do not use these medicines more often than directed on the label or symptoms may get worse. You may also use tablets containing pseudoephedrine. Avoid products that combine ingredients, because side effects may be increased. Read labels. You can also ask the pharmacist for help. (NOTE: Persons with high blood pressure should not use decongestants. They can raise blood pressure.)  · Over-the-counter antihistamines may help if allergies contributed to your sinusitis.    · Use acetaminophen or ibuprofen to control pain, unless another pain medicine was prescribed. (If you have chronic liver or kidney disease or ever had a stomach ulcer, talk with your doctor before using these medicines. Aspirin should never be used in anyone under 18 years of age who is ill with a fever. It may cause severe liver damage.)  · Use nasal rinses or irrigation as instructed by your health care  provider.  · Don't smoke. This can worsen symptoms.  Follow-up care  Follow up with your healthcare provider or our staff if you are not improving within the next week.  When to seek medical advice  Call your healthcare provider if any of these occur:  · Green or yellow discharge from the nose or into the throat  · Facial pain or headache becoming more severe  · Stiff neck  · Unusual drowsiness or confusion  · Swelling of the forehead or eyelids  · Vision problems, including blurred or double vision  · Fever of 100.4ºF (38ºC) or higher, or as directed by your healthcare provider  · Seizure  · Breathing problems  · Symptoms not resolving within 10 days  Date Last Reviewed: 4/13/2015  © 7022-9947 [employer]. [address] [place] [address], [place] [address]. All rights reserved. This information is not intended as a substitute for professional medical care. Always follow your healthcare professional's instructions.

## 2018-10-14 ENCOUNTER — OFFICE VISIT (OUTPATIENT)
Dept: URGENT CARE | Facility: CLINIC | Age: 43
End: 2018-10-14
Payer: COMMERCIAL

## 2018-10-14 VITALS
BODY MASS INDEX: 33.27 KG/M2 | HEIGHT: 67 IN | WEIGHT: 212 LBS | RESPIRATION RATE: 16 BRPM | OXYGEN SATURATION: 97 % | TEMPERATURE: 98 F | HEART RATE: 104 BPM | DIASTOLIC BLOOD PRESSURE: 88 MMHG | SYSTOLIC BLOOD PRESSURE: 133 MMHG

## 2018-10-14 DIAGNOSIS — R31.9 HEMATURIA, UNSPECIFIED TYPE: ICD-10-CM

## 2018-10-14 DIAGNOSIS — R30.0 DYSURIA: Primary | ICD-10-CM

## 2018-10-14 LAB
BILIRUB UR QL STRIP: NEGATIVE
GLUCOSE UR QL STRIP: NEGATIVE
KETONES UR QL STRIP: NEGATIVE
LEUKOCYTE ESTERASE UR QL STRIP: NEGATIVE
PH, POC UA: 5 (ref 5–8)
POC BLOOD, URINE: POSITIVE
POC NITRATES, URINE: NEGATIVE
PROT UR QL STRIP: NEGATIVE
SP GR UR STRIP: 1.02 (ref 1–1.03)
UROBILINOGEN UR STRIP-ACNC: NORMAL (ref 0.1–1.1)

## 2018-10-14 PROCEDURE — 3008F BODY MASS INDEX DOCD: CPT | Mod: CPTII,S$GLB,, | Performed by: PHYSICIAN ASSISTANT

## 2018-10-14 PROCEDURE — 99214 OFFICE O/P EST MOD 30 MIN: CPT | Mod: 25,S$GLB,, | Performed by: PHYSICIAN ASSISTANT

## 2018-10-14 PROCEDURE — 81003 URINALYSIS AUTO W/O SCOPE: CPT | Mod: QW,S$GLB,, | Performed by: PHYSICIAN ASSISTANT

## 2018-10-14 RX ORDER — CIPROFLOXACIN 500 MG/1
500 TABLET ORAL 2 TIMES DAILY
Qty: 10 TABLET | Refills: 0 | Status: SHIPPED | OUTPATIENT
Start: 2018-10-14 | End: 2018-10-19

## 2018-10-14 RX ORDER — PHENAZOPYRIDINE HYDROCHLORIDE 200 MG/1
200 TABLET, FILM COATED ORAL 3 TIMES DAILY PRN
Qty: 6 TABLET | Refills: 0 | Status: SHIPPED | OUTPATIENT
Start: 2018-10-14 | End: 2018-10-16

## 2018-10-14 NOTE — PROGRESS NOTES
"Subjective:       Patient ID: Imelda Kimble is a 43 y.o. female.    Vitals:  height is 5' 7" (1.702 m) and weight is 96.2 kg (212 lb). Her oral temperature is 98.4 °F (36.9 °C). Her blood pressure is 133/88 and her pulse is 104. Her respiration is 16 and oxygen saturation is 97%.     Chief Complaint: Dysuria    Patient presents to clinic with complaints of dysuria, urgency, and frequency for the past 2 days.  Patient denies hematuria.  She also denies fever, chills, nausea, and vomiting.  Patient denies change in vaginal discharge or risk for STDs.  Patient denies taking any OTC medications for her symptoms prior to arrival.  Patient reports a history of recurring UTIs.  She states that it took approximately 6 weeks to clear her last one.  Patient requesting a urine culture for this reason.  Patient denies any other complaints at this time.         Dysuria    This is a recurrent problem. The current episode started in the past 7 days. The problem has been gradually worsening. The quality of the pain is described as aching. The pain is moderate. There has been no fever. She is sexually active. There is no history of pyelonephritis. Associated symptoms include frequency and urgency. Pertinent negatives include no chills, discharge, flank pain, hematuria, nausea, vomiting or constipation. She has tried nothing for the symptoms. Her past medical history is significant for recurrent UTIs.     Review of Systems   Constitution: Negative for chills and fever.   Cardiovascular: Negative for chest pain.   Respiratory: Negative for shortness of breath.    Skin: Negative for itching.   Musculoskeletal: Negative for back pain.   Gastrointestinal: Negative for abdominal pain, constipation, diarrhea, hematochezia, melena, nausea and vomiting.   Genitourinary: Positive for dysuria, frequency and urgency. Negative for flank pain, genital sores, hematuria, missed menses and non-menstrual bleeding.   All other systems reviewed and are " negative.      Objective:      Physical Exam   Constitutional: She is oriented to person, place, and time. She appears well-developed and well-nourished.   HENT:   Head: Normocephalic and atraumatic.   Right Ear: External ear normal.   Left Ear: External ear normal.   Nose: Nose normal. No nasal deformity. No epistaxis.   Mouth/Throat: Oropharynx is clear and moist and mucous membranes are normal.   Eyes: Conjunctivae and lids are normal.   Neck: Trachea normal, normal range of motion and phonation normal. Neck supple.   Cardiovascular: Normal rate, regular rhythm, normal heart sounds and normal pulses.   Pulmonary/Chest: Effort normal and breath sounds normal.   Abdominal: Soft. Normal appearance and bowel sounds are normal. She exhibits no distension and no mass. There is no tenderness. There is no CVA tenderness.   Neurological: She is alert and oriented to person, place, and time.   Skin: Skin is warm, dry and intact.   Psychiatric: She has a normal mood and affect. Her speech is normal and behavior is normal. Cognition and memory are normal.   Nursing note and vitals reviewed.      Results for orders placed or performed in visit on 10/14/18   POCT Urinalysis, Dipstick, Automated, W/O Scope   Result Value Ref Range    POC Blood, Urine Positive (A) Negative    POC Bilirubin, Urine Negative Negative    POC Urobilinogen, Urine normal 0.1 - 1.1    POC Ketones, Urine Negative Negative    POC Protein, Urine Negative Negative    POC Nitrates, Urine Negative Negative    POC Glucose, Urine Negative Negative    pH, UA 5.0 5 - 8    POC Specific Gravity, Urine 1.025 1.003 - 1.029    POC Leukocytes, Urine Negative Negative        Assessment:       1. Dysuria    2. Hematuria, unspecified type        Plan:         Dysuria  -     POCT Urinalysis, Dipstick, Automated, W/O Scope  -     Urine culture  -     ciprofloxacin HCl (CIPRO) 500 MG tablet; Take 1 tablet (500 mg total) by mouth 2 (two) times daily. for 5 days  Dispense: 10  "tablet; Refill: 0  -     phenazopyridine (PYRIDIUM) 200 MG tablet; Take 1 tablet (200 mg total) by mouth 3 (three) times daily as needed for Pain.  Dispense: 6 tablet; Refill: 0    Hematuria, unspecified type  -     Urine culture  -     ciprofloxacin HCl (CIPRO) 500 MG tablet; Take 1 tablet (500 mg total) by mouth 2 (two) times daily. for 5 days  Dispense: 10 tablet; Refill: 0  -     phenazopyridine (PYRIDIUM) 200 MG tablet; Take 1 tablet (200 mg total) by mouth 3 (three) times daily as needed for Pain.  Dispense: 6 tablet; Refill: 0     Patient states that she has a history of recurrent UTIs with similar symptoms.  She also reports a history of her symptoms not showing up on dipstick UA but having something grow out on a culture.  Due to the above, will start patient on Cipro and order urine culture for confirmation.    Patient Instructions   *Urinary Tract Infections*  1) Avoid tub baths.  2) Always urinate after intercourse(Teens/Adults).  3) Avoid "Fizzy" drinks/soda drinks.  4) Always wipe from front to back.  5) Wear only cotton underwear.  6) Drink a lot of fluids (at least 8-10 glasses of water) for 5 to 7 days to help flush your kidneys. You can also drink 1 shot-sized glass of cranberry juice 3X daily over the next several days to help cleanse your bladder, but studies show that cranberry juice does not cure or prevent a UTI.   7) Take all medications as directed. Make sure to complete all antibiotics as prescribed.    8) For patients above 6 months of age who are not allergic to and are not on anticoagulants, you can alternate Tylenol and Motrin every 4-6 hours for fever above 100.4F and/or pain.  For patients less than 6 months of age, allergic to or intolerant to NSAIDS, have gastritis, gastric ulcers, or history of GI bleeds, are pregnant, or are on anticoagulant therapy, you can take Tylenol every 4 hours as needed for fever above 100.4F and/or pain.   9) You should schedule a follow-up appointment " "with your Primary Care Provider/Pediatrician for recheck in 2-3 days or as directed at this visit.   10) If your condition fails to improve in a timely manner, you should receive another evaluation by your Primary Care Provider/Pediatrician to discuss your concerns or return to urgent care for a recheck.  If your condition worsens at any time, you should report immediately to your nearest Emergency Department for further evaluation. **You must understand that you have received Urgent Care treatment only and that you may be released before all of your medical problems are known or treated. You, the patient, are responsible to arrange for follow-up care as instructed.           Bladder Infection, Female (Adult)    Urine is normally doesn't have any bacteria in it. But bacteria can get into the urinary tract from the skin around the rectum. Or they can travel in the blood from elsewhere in the body. Once they are in your urinary tract, they can cause infection in the urethra (urethritis), the bladder (cystitis), or the kidneys (pyelonephritis).  The most common place for an infection is in the bladder. This is called a bladder infection. This is one of the most common infections in women. Most bladder infections are easily treated. They are not serious unless the infection spreads to the kidney.  The phrases "bladder infection," "UTI," and "cystitis" are often used to describe the same thing. But they are not always the same. Cystitis is an inflammation of the bladder. The most common cause of cystitis is an infection.  Symptoms  The infection causes inflammation in the urethra and bladder. This causes many of the symptoms. The most common symptoms of a bladder infection are:  · Pain or burning when urinating  · Having to urinate more often than usual  · Urgent need to urinate  · Only a small amount of urine comes out  · Blood in urine  · Abdominal discomfort. This is usually in the lower abdomen above the pubic " bone.  · Cloudy urine  · Strong- or bad-smelling urine  · Unable to urinate (urinary retention)  · Unable to hold urine in (urinary incontinence)  · Fever  · Loss of appetite  · Confusion (in older adults)  Causes  Bladder infections are not contagious. You can't get one from someone else, from a toilet seat, or from sharing a bath.  The most common cause of bladder infections is bacteria from the bowels. The bacteria get onto the skin around the opening of the urethra. From there, they can get into the urine and travel up to the bladder, causing inflammation and infection. This usually happens because of:  · Wiping improperly after urinating. Always wipe from front to back.  · Bowel incontinence  · Pregnancy  · Procedures such as having a catheter inserted  · Older age  · Not emptying your bladder. This can allow bacteria a chance to grow in your urine.  · Dehydration  · Constipation  · Sex  · Use of a diaphragm for birth control   Treatment  Bladder infections are diagnosed by a urine test. They are treated with antibiotics and usually clear up quickly without complications. Treatment helps prevent a more serious kidney infection.  Medicines  Medicines can help in the treatment of a bladder infection:  · Take antibiotics until they are used up, even if you feel better. It is important to finish them to make sure the infection has cleared.  · You can use acetaminophen or ibuprofen for pain, fever, or discomfort, unless another medicine was prescribed. If you have chronic liver or kidney disease, talk with your healthcare provider before using these medicines. Also talk with your provider if you've ever had a stomach ulcer or gastrointestinal bleeding, or are taking blood-thinner medicines.  · If you are given phenazopydridine to reduce burning with urination, it will cause your urine to become a bright orange color. This can stain clothing.  Care and prevention  These self-care steps can help prevent future  infections:  · Drink plenty of fluids to prevent dehydration and flush out your bladder. Do this unless you must restrict fluids for other health reasons, or your doctor told you not to.  · Proper cleaning after going to the bathroom is important. Wipe from front to back after using the toilet to prevent the spread of bacteria.  · Urinate more often. Don't try to hold urine in for a long time.  · Wear loose-fitting clothes and cotton underwear. Avoid tight-fitting pants.  · Improve your diet and prevent constipation. Eat more fresh fruit and vegetables, and fiber, and less junk and fatty foods.  · Avoid sex until your symptoms are gone.  · Avoid caffeine, alcohol, and spicy foods. These can irritate your bladder.  · Urinate right after intercourse to flush out your bladder.  · If you use birth control pills and have frequent bladder infections, discuss it with your doctor.  Follow-up care  Call your healthcare provider if all symptoms are not gone after 3 days of treatment. This is especially important if you have repeat infections.  If a culture was done, you will be told if your treatment needs to be changed. If directed, you can call to find out the results.  If X-rays were done, you will be told if the results will affect your treatment.  Call 911  Call 911 if any of the following occur:  · Trouble breathing  · Hard to wake up or confusion  · Fainting or loss of consciousness  · Rapid heart rate  When to seek medical advice  Call your healthcare provider right away if any of these occur:  · Fever of 100.4ºF (38.0ºC) or higher, or as directed by your healthcare provider  · Symptoms are not better by the third day of treatment  · Back or belly (abdominal) pain that gets worse  · Repeated vomiting, or unable to keep medicine down  · Weakness or dizziness  · Vaginal discharge  · Pain, redness, or swelling in the outer vaginal area (labia)  Date Last Reviewed: 10/1/2016  © 9951-5162 [employer] [address]  [place] [address]. All rights reserved. This information is not intended as a substitute for professional medical care. Always follow your healthcare professional's instructions.

## 2018-10-14 NOTE — PATIENT INSTRUCTIONS
"*Urinary Tract Infections*  1) Avoid tub baths.  2) Always urinate after intercourse(Teens/Adults).  3) Avoid "Fizzy" drinks/soda drinks.  4) Always wipe from front to back.  5) Wear only cotton underwear.  6) Drink a lot of fluids (at least 8-10 glasses of water) for 5 to 7 days to help flush your kidneys. You can also drink 1 shot-sized glass of cranberry juice 3X daily over the next several days to help cleanse your bladder, but studies show that cranberry juice does not cure or prevent a UTI.   7) Take all medications as directed. Make sure to complete all antibiotics as prescribed.    8) For patients above 6 months of age who are not allergic to and are not on anticoagulants, you can alternate Tylenol and Motrin every 4-6 hours for fever above 100.4F and/or pain.  For patients less than 6 months of age, allergic to or intolerant to NSAIDS, have gastritis, gastric ulcers, or history of GI bleeds, are pregnant, or are on anticoagulant therapy, you can take Tylenol every 4 hours as needed for fever above 100.4F and/or pain.   9) You should schedule a follow-up appointment with your Primary Care Provider/Pediatrician for recheck in 2-3 days or as directed at this visit.   10) If your condition fails to improve in a timely manner, you should receive another evaluation by your Primary Care Provider/Pediatrician to discuss your concerns or return to urgent care for a recheck.  If your condition worsens at any time, you should report immediately to your nearest Emergency Department for further evaluation. **You must understand that you have received Urgent Care treatment only and that you may be released before all of your medical problems are known or treated. You, the patient, are responsible to arrange for follow-up care as instructed.           Bladder Infection, Female (Adult)    Urine is normally doesn't have any bacteria in it. But bacteria can get into the urinary tract from the skin around the rectum. Or they " "can travel in the blood from elsewhere in the body. Once they are in your urinary tract, they can cause infection in the urethra (urethritis), the bladder (cystitis), or the kidneys (pyelonephritis).  The most common place for an infection is in the bladder. This is called a bladder infection. This is one of the most common infections in women. Most bladder infections are easily treated. They are not serious unless the infection spreads to the kidney.  The phrases "bladder infection," "UTI," and "cystitis" are often used to describe the same thing. But they are not always the same. Cystitis is an inflammation of the bladder. The most common cause of cystitis is an infection.  Symptoms  The infection causes inflammation in the urethra and bladder. This causes many of the symptoms. The most common symptoms of a bladder infection are:  · Pain or burning when urinating  · Having to urinate more often than usual  · Urgent need to urinate  · Only a small amount of urine comes out  · Blood in urine  · Abdominal discomfort. This is usually in the lower abdomen above the pubic bone.  · Cloudy urine  · Strong- or bad-smelling urine  · Unable to urinate (urinary retention)  · Unable to hold urine in (urinary incontinence)  · Fever  · Loss of appetite  · Confusion (in older adults)  Causes  Bladder infections are not contagious. You can't get one from someone else, from a toilet seat, or from sharing a bath.  The most common cause of bladder infections is bacteria from the bowels. The bacteria get onto the skin around the opening of the urethra. From there, they can get into the urine and travel up to the bladder, causing inflammation and infection. This usually happens because of:  · Wiping improperly after urinating. Always wipe from front to back.  · Bowel incontinence  · Pregnancy  · Procedures such as having a catheter inserted  · Older age  · Not emptying your bladder. This can allow bacteria a chance to grow in your " urine.  · Dehydration  · Constipation  · Sex  · Use of a diaphragm for birth control   Treatment  Bladder infections are diagnosed by a urine test. They are treated with antibiotics and usually clear up quickly without complications. Treatment helps prevent a more serious kidney infection.  Medicines  Medicines can help in the treatment of a bladder infection:  · Take antibiotics until they are used up, even if you feel better. It is important to finish them to make sure the infection has cleared.  · You can use acetaminophen or ibuprofen for pain, fever, or discomfort, unless another medicine was prescribed. If you have chronic liver or kidney disease, talk with your healthcare provider before using these medicines. Also talk with your provider if you've ever had a stomach ulcer or gastrointestinal bleeding, or are taking blood-thinner medicines.  · If you are given phenazopydridine to reduce burning with urination, it will cause your urine to become a bright orange color. This can stain clothing.  Care and prevention  These self-care steps can help prevent future infections:  · Drink plenty of fluids to prevent dehydration and flush out your bladder. Do this unless you must restrict fluids for other health reasons, or your doctor told you not to.  · Proper cleaning after going to the bathroom is important. Wipe from front to back after using the toilet to prevent the spread of bacteria.  · Urinate more often. Don't try to hold urine in for a long time.  · Wear loose-fitting clothes and cotton underwear. Avoid tight-fitting pants.  · Improve your diet and prevent constipation. Eat more fresh fruit and vegetables, and fiber, and less junk and fatty foods.  · Avoid sex until your symptoms are gone.  · Avoid caffeine, alcohol, and spicy foods. These can irritate your bladder.  · Urinate right after intercourse to flush out your bladder.  · If you use birth control pills and have frequent bladder infections, discuss it  with your doctor.  Follow-up care  Call your healthcare provider if all symptoms are not gone after 3 days of treatment. This is especially important if you have repeat infections.  If a culture was done, you will be told if your treatment needs to be changed. If directed, you can call to find out the results.  If X-rays were done, you will be told if the results will affect your treatment.  Call 911  Call 911 if any of the following occur:  · Trouble breathing  · Hard to wake up or confusion  · Fainting or loss of consciousness  · Rapid heart rate  When to seek medical advice  Call your healthcare provider right away if any of these occur:  · Fever of 100.4ºF (38.0ºC) or higher, or as directed by your healthcare provider  · Symptoms are not better by the third day of treatment  · Back or belly (abdominal) pain that gets worse  · Repeated vomiting, or unable to keep medicine down  · Weakness or dizziness  · Vaginal discharge  · Pain, redness, or swelling in the outer vaginal area (labia)  Date Last Reviewed: 10/1/2016  © 9327-1869 [employer]. [address] [place] [address], [place] [address]. All rights reserved. This information is not intended as a substitute for professional medical care. Always follow your healthcare professional's instructions.

## 2018-10-17 ENCOUNTER — TELEPHONE (OUTPATIENT)
Dept: URGENT CARE | Facility: CLINIC | Age: 43
End: 2018-10-17

## 2018-10-18 LAB
BACTERIA UR CULT: NO GROWTH
BACTERIA UR CULT: NORMAL

## 2018-10-22 ENCOUNTER — TELEPHONE (OUTPATIENT)
Dept: URGENT CARE | Facility: CLINIC | Age: 43
End: 2018-10-22

## 2018-10-22 NOTE — TELEPHONE ENCOUNTER
Attempted to call patient to give her urine culture results.  There was no answer.  Left message for patient to return my call.

## 2018-11-09 ENCOUNTER — TELEPHONE (OUTPATIENT)
Dept: URGENT CARE | Facility: CLINIC | Age: 43
End: 2018-11-09

## 2018-11-09 NOTE — TELEPHONE ENCOUNTER
Patient called back to obtain results of her urine culture from her Urgent Care visit on 10/14/18. She completed her antibiotic course and her symptoms resolved. Spoke to her and discussed that her final report showed no bacterial growth. Reviewed some common possible causes of hematuria without bacterial growth that occur with symptoms of UTIs including kidney stones, inflammation of the kidneys, ureters, or bladder, or menstrual bleeding. Advised she f/u with her PCP for further evaluation if she is concerned given her history of recurrent UTIs.

## 2018-12-03 ENCOUNTER — OFFICE VISIT (OUTPATIENT)
Dept: URGENT CARE | Facility: CLINIC | Age: 43
End: 2018-12-03
Payer: COMMERCIAL

## 2018-12-03 VITALS
TEMPERATURE: 98 F | OXYGEN SATURATION: 100 % | BODY MASS INDEX: 33.27 KG/M2 | HEIGHT: 67 IN | WEIGHT: 212 LBS | DIASTOLIC BLOOD PRESSURE: 90 MMHG | SYSTOLIC BLOOD PRESSURE: 130 MMHG | HEART RATE: 81 BPM

## 2018-12-03 DIAGNOSIS — N30.00 ACUTE CYSTITIS WITHOUT HEMATURIA: ICD-10-CM

## 2018-12-03 DIAGNOSIS — R35.0 URINARY FREQUENCY: ICD-10-CM

## 2018-12-03 DIAGNOSIS — Z87.440 HISTORY OF RECURRENT UTIS: ICD-10-CM

## 2018-12-03 DIAGNOSIS — R30.0 DYSURIA: Primary | ICD-10-CM

## 2018-12-03 LAB
BILIRUB UR QL STRIP: NEGATIVE
GLUCOSE UR QL STRIP: NEGATIVE
KETONES UR QL STRIP: NEGATIVE
LEUKOCYTE ESTERASE UR QL STRIP: POSITIVE
PH, POC UA: 5 (ref 5–8)
POC BLOOD, URINE: NEGATIVE
POC NITRATES, URINE: NEGATIVE
PROT UR QL STRIP: POSITIVE
SP GR UR STRIP: 1.02 (ref 1–1.03)
UROBILINOGEN UR STRIP-ACNC: POSITIVE (ref 0.1–1.1)

## 2018-12-03 PROCEDURE — 81003 URINALYSIS AUTO W/O SCOPE: CPT | Mod: QW,S$GLB,, | Performed by: NURSE PRACTITIONER

## 2018-12-03 PROCEDURE — 99213 OFFICE O/P EST LOW 20 MIN: CPT | Mod: 25,S$GLB,, | Performed by: NURSE PRACTITIONER

## 2018-12-03 PROCEDURE — 3008F BODY MASS INDEX DOCD: CPT | Mod: CPTII,S$GLB,, | Performed by: NURSE PRACTITIONER

## 2018-12-03 RX ORDER — PHENAZOPYRIDINE HYDROCHLORIDE 200 MG/1
200 TABLET, FILM COATED ORAL
Qty: 6 TABLET | Refills: 0 | Status: SHIPPED | OUTPATIENT
Start: 2018-12-03 | End: 2019-01-17 | Stop reason: ALTCHOICE

## 2018-12-03 RX ORDER — CIPROFLOXACIN 500 MG/1
500 TABLET ORAL EVERY 12 HOURS
Qty: 14 TABLET | Refills: 0 | Status: SHIPPED | OUTPATIENT
Start: 2018-12-03 | End: 2019-01-17 | Stop reason: ALTCHOICE

## 2018-12-03 NOTE — PATIENT INSTRUCTIONS
When Your Child Has a Urinary Tract Infection (UTI)   A urinary tract infection (UTI) is a bacterial infection in the urinary tract. The urinary tract is made up of the kidneys, ureters, bladder, and urethra. Children often get UTIs that affect the bladder. UTIs can be uncomfortable and painful. But with treatment, most children recover with no lasting effects.  What is the urinary tract?  The following body parts make up the urinary tract:     A urinary tract infection is caused by bacteria that enter the urinary tract.    · Kidneys filter waste from the blood and make urine.  · Ureters carry urine from the kidneys to the bladder.  · The bladder stores urine.  · The urethra carries urine from the bladder to the outside of the body.  What causes a urinary tract infection?  Most UTIs are caused by bacteria that enter the urinary tract through the urethra. The urinary tracts of boys and girls are slightly different. The urethra is shorter in girls. This makes it easier for bacteria to enter. As a result, girls are more likely than boys to get UTIs.  What are the symptoms of a urinary tract infection?  · If your child has a UTI affecting the bladder (cystitis), symptoms can include:  ¨ Painful urination  ¨ Frequent urination  ¨ Urgent need to urinate  ¨ Blood in the urine  ¨ Daytime wetting or nighttime bedwetting when previously continent  · If your child has a UTI affecting the kidneys (pyelonephritis), symptoms are similar to those of a bladder infection. They can also include:  ¨ Fever  ¨ Abdominal pain  ¨ Nausea and vomiting  ¨ Cloudy urine  ¨ Foul-smelling urine  How is a urinary tract infection diagnosed?  · The doctor asks about your childs symptoms and health history. Your child is examined.  · A lab test, such as a urinalysis, is done. For this test, a urine sample is needed to check for bacteria and other signs of infection. The urine is also sent for a culture, a test that identifies what bacteria is  growing in the urine. It can take 1 to 3 days to get results of a urine culture. If a UTI is suspected, the doctor will likely start treatment even before lab results come back.  · If your child has severe symptoms, other tests may be done. Youll be told more about this, if needed.  How is a urinary tract infection treated?  · Symptoms of a UTI generally go away within 24 to 72 hours of starting treatment.  · The doctor will prescribe antibiotics for your child. Make sure your child takes ALL of the medication even if he or she starts feeling better.   · You can do the following at home to relieve your childs symptoms:  ¨ Give your child over-the-counter (OTC) medications, such as ibuprofen or acetaminophen, to manage pain and fever. Do not give ibuprofen to an infant who is less than 6 months of age, or to a child who is dehydrated or constantly vomiting. Do not give aspirin to a child with a fever. This can put your child at risk of a serious illness called Reyes syndrome.  ¨ Ask your doctor about other medications that can be prescribed to relieve painful urination.  ¨ Give your child plenty of fluids to drink. Cranberry juice may help relieve some pain symptoms.  When you should call your healthcare provider  Call the doctor if your child has any of the following:  · Symptoms that do not improve within 48 hours of starting treatment  · Fever (see Fever and children, below)  · A fever that goes away but returns after starting treatment  · Increased abdominal or back pain  · Signs of dehydration (very dark or little urine, excessive thirst, dry mouth, dizziness)  · Vomiting or inability to tolerate prescribed antibiotics  · Child begins acting sicker  · If a urine culture was done, make sure to get the results from the healthcare provider. Make an appointment to follow up about a week after your child has finished antibiotics.  Fever and children  Always use a digital thermometer to check your childs  temperature. Never use a mercury thermometer.  For infants and toddlers, be sure to use a rectal thermometer correctly. A rectal thermometer may accidentally poke a hole in (perforate) the rectum. It may also pass on germs from the stool. Always follow the product makers directions for proper use. If you dont feel comfortable taking a rectal temperature, use another method. When you talk to your childs healthcare provider, tell him or her which method you used to take your childs temperature.  Here are guidelines for fever temperature. Ear temperatures arent accurate before 6 months of age. Dont take an oral temperature until your child is at least 4 years old.  Infant under 3 months old:  · Ask your childs healthcare provider how you should take the temperature.  · Rectal or forehead (temporal artery) temperature of 100.4°F (38°C) or higher, or as directed by the provider  · Armpit temperature of 99°F (37.2°C) or higher, or as directed by the provider  Child age 3 to 36 months:  · Rectal, forehead (temporal artery), or ear temperature of 102°F (38.9°C) or higher, or as directed by the provider  · Armpit temperature of 101°F (38.3°C) or higher, or as directed by the provider  Child of any age:  · Repeated temperature of 104°F (40°C) or higher, or as directed by the provider  · Fever that lasts more than 24 hours in a child under 2 years old. Or a fever that lasts for 3 days in a child 2 years or older.      How is a urinary tract infection prevented?  · Encourage your child to drink plenty of fluids.  · Encourage your child to empty the bladder all the way when urinating.  · Teach girls to wipe from the front to back when using the bathroom.  · Don't use bubble bath.  · Don't allow your child to become constipated.  · If your child has a UTI, he or she may need ultrasound imaging of the kidneys and bladder. This helps the doctor rule out possible anatomical problems that could cause a UTI. If problems are  found, or if your child has recurrent UTIs, additional imaging tests may be helpful.  Date Last Reviewed: 1/1/2017  © 3206-0363 [employer]. [address] [place] [address], [place] [address]. All rights reserved. This information is not intended as a substitute for professional medical care. Always follow your healthcare professional's instructions.

## 2018-12-03 NOTE — PROGRESS NOTES
"Subjective:       Patient ID: Imelda Kimble is a 43 y.o. female.    Vitals:  height is 5' 7" (1.702 m) and weight is 96.2 kg (212 lb). Her oral temperature is 97.8 °F (36.6 °C). Her blood pressure is 130/90 (abnormal) and her pulse is 81. Her oxygen saturation is 100%.     Chief Complaint: Urinary Tract Infection    Urinary Tract Infection    This is a new problem. The current episode started today. The problem occurs every urination. The problem has been gradually worsening. The quality of the pain is described as burning and aching. The pain is at a severity of 10/10. The pain is severe. There has been no fever. She is sexually active. There is no history of pyelonephritis. Associated symptoms include frequency, hesitancy, urgency and withholding. Pertinent negatives include no chills, discharge, flank pain, hematuria, nausea, vomiting, constipation or rash. Treatments tried: azo. The treatment provided no relief. There is no history of catheterization, diabetes insipidus, diabetes mellitus, genitourinary reflux, hypertension, kidney stones, recurrent UTIs, a single kidney, STD, urinary stasis or a urological procedure.       Constitution: Negative for chills and fever.   Neck: Negative for painful lymph nodes.   Gastrointestinal: Negative for abdominal pain, nausea, vomiting and constipation.   Genitourinary: Positive for frequency and urgency. Negative for dysuria, urine decreased, flank pain, hematuria, history of kidney stones, painful menstruation, irregular menstruation, missed menses, heavy menstrual bleeding, ovarian cysts, genital trauma, vaginal pain, vaginal discharge, vaginal bleeding, vaginal sores, vaginal odor, painful intercourse, genital sore, painful ejaculation and pelvic pain.   Musculoskeletal: Negative for back pain.   Skin: Negative for rash and lesion.   Hematologic/Lymphatic: Negative for swollen lymph nodes.       Objective:      Physical Exam   Constitutional: She is oriented to person, " place, and time. She appears well-developed and well-nourished.   HENT:   Head: Normocephalic and atraumatic.   Cardiovascular: Normal rate, regular rhythm and normal heart sounds.   Pulmonary/Chest: Effort normal and breath sounds normal.   Abdominal: Soft. Bowel sounds are normal. There is no tenderness.   Musculoskeletal: She exhibits no edema or tenderness.   Neg cva   Neurological: She is alert and oriented to person, place, and time.   Skin: Skin is warm and dry.   Psychiatric: She has a normal mood and affect. Her behavior is normal. Judgment and thought content normal.   Nursing note and vitals reviewed.      Assessment:       1. Dysuria    2. Urinary frequency    3. Acute cystitis without hematuria    4. History of recurrent UTIs        Plan:         1. Dysuria    - POCT Urinalysis, Dipstick, Automated, W/O Scope    2. Urinary frequency  Ok for pyridium    3. Acute cystitis without hematuria    - Urine culture  - ciprofloxacin HCl (CIPRO) 500 MG tablet; Take 1 tablet (500 mg total) by mouth every 12 (twelve) hours.  Dispense: 14 tablet; Refill: 0  - phenazopyridine (PYRIDIUM) 200 MG tablet; Take 1 tablet (200 mg total) by mouth 3 (three) times daily with meals.  Dispense: 6 tablet; Refill: 0    4. History of recurrent UTIs    - Urine culture

## 2018-12-05 LAB
BACTERIA UR CULT: NORMAL
BACTERIA UR CULT: NORMAL

## 2019-01-17 ENCOUNTER — OFFICE VISIT (OUTPATIENT)
Dept: URGENT CARE | Facility: CLINIC | Age: 44
End: 2019-01-17
Payer: COMMERCIAL

## 2019-01-17 VITALS
OXYGEN SATURATION: 97 % | WEIGHT: 226 LBS | RESPIRATION RATE: 20 BRPM | DIASTOLIC BLOOD PRESSURE: 88 MMHG | BODY MASS INDEX: 35.47 KG/M2 | SYSTOLIC BLOOD PRESSURE: 127 MMHG | HEIGHT: 67 IN | TEMPERATURE: 98 F | HEART RATE: 78 BPM

## 2019-01-17 DIAGNOSIS — J01.10 ACUTE NON-RECURRENT FRONTAL SINUSITIS: Primary | ICD-10-CM

## 2019-01-17 PROCEDURE — 3008F PR BODY MASS INDEX (BMI) DOCUMENTED: ICD-10-PCS | Mod: CPTII,S$GLB,, | Performed by: NURSE PRACTITIONER

## 2019-01-17 PROCEDURE — 99213 OFFICE O/P EST LOW 20 MIN: CPT | Mod: S$GLB,,, | Performed by: NURSE PRACTITIONER

## 2019-01-17 PROCEDURE — 99213 PR OFFICE/OUTPT VISIT, EST, LEVL III, 20-29 MIN: ICD-10-PCS | Mod: S$GLB,,, | Performed by: NURSE PRACTITIONER

## 2019-01-17 PROCEDURE — 3008F BODY MASS INDEX DOCD: CPT | Mod: CPTII,S$GLB,, | Performed by: NURSE PRACTITIONER

## 2019-01-17 RX ORDER — PREDNISONE 10 MG/1
10 TABLET ORAL DAILY
Qty: 5 TABLET | Refills: 0 | Status: SHIPPED | OUTPATIENT
Start: 2019-01-17 | End: 2019-01-22

## 2019-01-17 RX ORDER — DOXYCYCLINE 100 MG/1
100 CAPSULE ORAL 2 TIMES DAILY
Qty: 14 CAPSULE | Refills: 0 | Status: SHIPPED | OUTPATIENT
Start: 2019-01-17 | End: 2020-12-07 | Stop reason: CLARIF

## 2019-01-17 NOTE — PROGRESS NOTES
"Subjective:       Patient ID: Imelda Kimble is a 43 y.o. female.    Vitals:  height is 5' 7" (1.702 m) and weight is 102.5 kg (226 lb). Her tympanic temperature is 98.3 °F (36.8 °C). Her blood pressure is 127/88 and her pulse is 78. Her respiration is 20 and oxygen saturation is 97%.     Chief Complaint: Sore Throat    Sore Throat    This is a new problem. The current episode started in the past 7 days (3 Days). The problem has been gradually worsening. Neither side of throat is experiencing more pain than the other. There has been no fever. The pain is at a severity of 6/10. The pain is moderate. Associated symptoms include congestion, coughing, ear pain and trouble swallowing. Pertinent negatives include no shortness of breath, stridor or vomiting. She has tried nothing for the symptoms.       Constitution: Negative for chills, sweating, fatigue and fever.   HENT: Positive for ear pain, congestion, sore throat and trouble swallowing. Negative for sinus pain, sinus pressure and voice change.    Neck: Negative for painful lymph nodes.   Eyes: Negative for eye redness.   Respiratory: Positive for cough and sputum production. Negative for chest tightness, bloody sputum, COPD, shortness of breath, stridor, wheezing and asthma.    Gastrointestinal: Negative for nausea and vomiting.   Musculoskeletal: Negative for muscle ache.   Skin: Negative for rash.   Allergic/Immunologic: Negative for seasonal allergies and asthma.   Hematologic/Lymphatic: Negative for swollen lymph nodes.       Objective:      Physical Exam   Constitutional: She is oriented to person, place, and time. She appears well-developed and well-nourished.   HENT:   Head: Normocephalic and atraumatic.   Right Ear: External ear normal.   Left Ear: External ear normal.   Nose: Nose normal.   Mouth/Throat: Oropharyngeal exudate present.   pnd   Cardiovascular: Normal rate, regular rhythm and normal heart sounds.   Pulmonary/Chest: Effort normal and breath " sounds normal.   Abdominal: Soft. Bowel sounds are normal. There is no tenderness.   Musculoskeletal: She exhibits no edema.   Neurological: She is alert and oriented to person, place, and time.   Skin: Skin is warm and dry.   Psychiatric: She has a normal mood and affect. Her behavior is normal. Judgment and thought content normal.   Nursing note and vitals reviewed.      Assessment:       1. Acute non-recurrent frontal sinusitis        Plan:         1. Acute non-recurrent frontal sinusitis  Ok to continue flonase as well.   - doxycycline (VIBRAMYCIN) 100 MG Cap; Take 1 capsule (100 mg total) by mouth 2 (two) times daily.  Dispense: 14 capsule; Refill: 0  - predniSONE (DELTASONE) 10 MG tablet; Take 1 tablet (10 mg total) by mouth once daily. for 5 doses  Dispense: 5 tablet; Refill: 0

## 2019-01-17 NOTE — PATIENT INSTRUCTIONS
Sinusitis (Antibiotic Treatment)    The sinuses are air-filled spaces within the bones of the face. They connect to the inside of the nose. Sinusitis is an inflammation of the tissue lining the sinus cavity. Sinus inflammation can occur during a cold. It can also be due to allergies to pollens and other particles in the air. Sinusitis can cause symptoms of sinus congestion and fullness. A sinus infection causes fever, headache and facial pain. There is often green or yellow drainage from the nose or into the back of the throat (post-nasal drip). You have been given antibiotics to treat this condition.  Home care:  · Take the full course of antibiotics as instructed. Do not stop taking them, even if you feel better.  · Drink plenty of water, hot tea, and other liquids. This may help thin mucus. It also may promote sinus drainage.  · Heat may help soothe painful areas of the face. Use a towel soaked in hot water. Or,  the shower and direct the hot spray onto your face. Using a vaporizer along with a menthol rub at night may also help.   · An expectorant containing guaifenesin may help thin the mucus and promote drainage from the sinuses.  · Over-the-counter decongestants may be used unless a similar medicine was prescribed. Nasal sprays work the fastest. Use one that contains phenylephrine or oxymetazoline. First blow the nose gently. Then use the spray. Do not use these medicines more often than directed on the label or symptoms may get worse. You may also use tablets containing pseudoephedrine. Avoid products that combine ingredients, because side effects may be increased. Read labels. You can also ask the pharmacist for help. (NOTE: Persons with high blood pressure should not use decongestants. They can raise blood pressure.)  · Over-the-counter antihistamines may help if allergies contributed to your sinusitis.    · Do not use nasal rinses or irrigation during an acute sinus infection, unless told to by  your health care provider. Rinsing may spread the infection to other sinuses.  · Use acetaminophen or ibuprofen to control pain, unless another pain medicine was prescribed. (If you have chronic liver or kidney disease or ever had a stomach ulcer, talk with your doctor before using these medicines. Aspirin should never be used in anyone under 18 years of age who is ill with a fever. It may cause severe liver damage.)  · Don't smoke. This can worsen symptoms.  Follow-up care  Follow up with your healthcare provider or our staff if you are not improving within the next week.  When to seek medical advice  Call your healthcare provider if any of these occur:  · Facial pain or headache becoming more severe  · Stiff neck  · Unusual drowsiness or confusion  · Swelling of the forehead or eyelids  · Vision problems, including blurred or double vision  · Fever of 100.4ºF (38ºC) or higher, or as directed by your healthcare provider  · Seizure  · Breathing problems  · Symptoms not resolving within 10 days  Date Last Reviewed: 4/13/2015  © 3547-9281 [employer]. [address] [place] [address], [place] [address]. All rights reserved. This information is not intended as a substitute for professional medical care. Always follow your healthcare professional's instructions.

## 2020-12-09 PROBLEM — D25.1 ABNORMAL UTERINE BLEEDING DUE TO INTRAMURAL LEIOMYOMA: Status: RESOLVED | Noted: 2020-12-09 | Resolved: 2020-12-09

## 2020-12-09 PROBLEM — N93.9 ABNORMAL UTERINE BLEEDING DUE TO INTRAMURAL LEIOMYOMA: Status: ACTIVE | Noted: 2020-12-09

## 2020-12-09 PROBLEM — N93.9 ABNORMAL UTERINE BLEEDING DUE TO INTRAMURAL LEIOMYOMA: Status: RESOLVED | Noted: 2020-12-09 | Resolved: 2020-12-09

## 2020-12-09 PROBLEM — D25.1 ABNORMAL UTERINE BLEEDING DUE TO INTRAMURAL LEIOMYOMA: Status: ACTIVE | Noted: 2020-12-09

## 2020-12-11 PROBLEM — N93.9 ABNORMAL UTERINE BLEEDING DUE TO INTRAMURAL LEIOMYOMA: Status: RESOLVED | Noted: 2020-12-09 | Resolved: 2020-12-11

## 2020-12-11 PROBLEM — D25.1 ABNORMAL UTERINE BLEEDING DUE TO INTRAMURAL LEIOMYOMA: Status: RESOLVED | Noted: 2020-12-09 | Resolved: 2020-12-11

## 2023-06-23 ENCOUNTER — OFFICE VISIT (OUTPATIENT)
Dept: URGENT CARE | Facility: CLINIC | Age: 48
End: 2023-06-23
Payer: COMMERCIAL

## 2023-06-23 VITALS
HEART RATE: 84 BPM | WEIGHT: 250 LBS | HEIGHT: 67 IN | BODY MASS INDEX: 39.24 KG/M2 | DIASTOLIC BLOOD PRESSURE: 84 MMHG | OXYGEN SATURATION: 99 % | RESPIRATION RATE: 16 BRPM | SYSTOLIC BLOOD PRESSURE: 122 MMHG | TEMPERATURE: 98 F

## 2023-06-23 DIAGNOSIS — Z53.21 PATIENT LEFT WITHOUT BEING SEEN: Primary | ICD-10-CM

## 2023-06-23 NOTE — PROGRESS NOTES
"Subjective:      Patient ID: Imelda Kimble is a 47 y.o. female.    Vitals:  height is 5' 7" (1.702 m) and weight is 113.4 kg (250 lb). Her oral temperature is 98 °F (36.7 °C). Her blood pressure is 122/84 and her pulse is 84. Her respiration is 16 and oxygen saturation is 99%.     Chief Complaint: Knee Pain (PT presents today with left chronic lumbalia x 3 yrs intermittently. )    Knee Pain   The incident occurred more than 1 week ago. There was no injury mechanism. The pain is present in the left knee. The pain is at a severity of 3/10. The pain is mild. The pain has been Constant since onset. Nothing aggravates the symptoms. The treatment provided no relief.   ROS   Objective:     Physical Exam    Assessment:     No diagnosis found.    Plan:       There are no diagnoses linked to this encounter.                "

## 2024-07-09 ENCOUNTER — DOCUMENTATION ONLY (OUTPATIENT)
Dept: HEMATOLOGY/ONCOLOGY | Facility: CLINIC | Age: 49
End: 2024-07-09
Payer: COMMERCIAL

## 2024-07-09 DIAGNOSIS — C20 RECTAL CANCER: Primary | ICD-10-CM

## 2024-07-09 NOTE — PROGRESS NOTES
Called pt to inform her of her upcoming 7/15/24 MRI and 7/16/24 appt with Dr Menendez and Dr Rudolph. I reviewed date location and time, provided my direct contact information and encouraged them to call for any assistance.  Pt verbalized understanding.    Oncology Navigation   Intake  Date of Diagnosis: 06/24/24  Cancer Type: GI  Type of Referral: Internal  Date of Referral: 07/01/24  Initial Nurse Navigator Contact: 07/09/24  Referral to Initial Contact Timeline (days): 8  First Appointment Available: 07/16/24  Appointment Date: 07/16/24  First Available Date vs. Scheduled Date (days): 0     Treatment  Current Status: Staging work-up    Surgical Oncologist: Shon  Consult Date: 07/01/24    Medical Oncologist: Klaudia  Consult Date: 07/16/24       Procedures: Biopsy; Colonoscopy  Biopsy Schedule Date: 06/24/24                 Acuity      Follow Up  No follow-ups on file.

## 2024-07-15 ENCOUNTER — TELEPHONE (OUTPATIENT)
Dept: SURGERY | Facility: CLINIC | Age: 49
End: 2024-07-15
Payer: COMMERCIAL

## 2024-07-15 ENCOUNTER — HOSPITAL ENCOUNTER (OUTPATIENT)
Dept: RADIOLOGY | Facility: HOSPITAL | Age: 49
Discharge: HOME | End: 2024-07-15
Attending: INTERNAL MEDICINE
Payer: COMMERCIAL

## 2024-07-15 DIAGNOSIS — C20 RECTAL CANCER: ICD-10-CM

## 2024-07-15 PROCEDURE — 72195 MRI PELVIS W/O DYE: CPT | Mod: TC

## 2024-07-15 PROCEDURE — 72195 MRI PELVIS W/O DYE: CPT | Mod: 26,,, | Performed by: RADIOLOGY

## 2024-07-15 NOTE — PROGRESS NOTES
[facility]  Colon and Rectal Surgery    [address]  [place], [place]  Tel: [phone]  Fax: [phone]  [email or web address]   MD Andrew Mccloud MD Brian Kann, MD W. Forrest Johnston, MD Matthew Giglia, MD Jennifer Paruch, MD William Kethman, MD Danielle Kay, MD     Patient name: Imelda Kimble   YOB: 1975   MRN: [id number]    Dear Dr. Rudolph,    It was a pleasure seeing Ms. Kimble in the Colon and Rectal Surgery clinic here at [facility].     As you know, Ms. Kimble is a 48 year old woman with a history of HTN and anxiety  who presents for evaluation of rectal adenocarcinoma.  She recently underwent a colonoscopy (performed for screening purposes) for rectal bleeding (developed 2-3 weeks before the colonoscopy - this was everyday) and was found to have a distal rectal mass - biopsy proven adenocarcinoma, see history below. She is scheduled for a CEA and CT CAP this week. She has always had issues with constipation and was treated for presumed IBS-C. She was not having or having pain currently. She has had an open hysterectomy. She has had 5 members of her family diagnosed with cancers - but no family history of colon cancer. She is up-to-date with screening mammography. She denies any nausea, vomiting, fevers, chills, chest pain, or shortness of breath. She denies any fecal incontinence.    Oncology History   Rectal adenocarcinoma   6/21/2024 Procedure    Colonoscopy - mild diverticular disease, mass in distal rectum (Complete)     6/24/2024 Initial Diagnosis    Rectal adenocarcinoma  ERROL  - Colonoscopy 6/21/24: infiltrative, ulcerated, sub-mucosal, fungating and villous bleeding mass found in distal rectum, 6-7cm from the anus proximally.   - Distal rectal mass biopsy 6/21/24: well differentiated adenocarcinoma, negative for LVI and perineural invasion. IHC MSI-H low probability with intact nuclear expressions of MLH1, MSH2, MSH6, and  PMS2.        7/15/2024 Imaging Significant Findings    MRI Rectal cancer  Low rectal tumor invading the internal sphincter in close approximation to the mesorectal fascia without definite involvement.  Multiple suspicious mesorectal and superior rectal nodes.     *MR STAGE     T: T1/T2 (tumor confined to rectal wall)     N: N+ (short axis ? 9 mm)     Blayne statement: Multiple suspicious mesorectal and superior rectal nodes.     *MRF: threatened (tumor margin within 1-2 mm of MRF) noting this is a low rectal tumor.  No definite evidence of invasion.     Sphincter Involvement: Yes involvement the internal sphincter     Suspicious Extramesorectal Lymph Nodes: No     EMVI: No     7/16/2024 Tumor Markers    Patient's tumor was tested for the following markers: CEA                                              Results of the tumor marker test revealed 2.9          The patient was informed of the availability of a certified  without charge. A certified  was not necessary for this visit.    Review of Systems  See pertinent review of systems above    Past Medical History:   Diagnosis Date    Anxiety disorder, unspecified     Fibroids     HTN (hypertension)     Sinus problem      Past Surgical History:   Procedure Laterality Date    BILATERAL SALPINGO-OOPHORECTOMY (BSO) Bilateral 12/9/2020    Procedure: SALPINGO-OOPHORECTOMY, BILATERAL;  Surgeon: Yun Recio MD;  Location: [facility] OR;  Service: OB/GYN;  Laterality: Bilateral;  COVID NEGATIVE 12/3    INCISION OF UVULA      knee arhroscopy      NASAL SEPTUM SURGERY      SINUS SURGERY      TONSILLECTOMY      TOTAL ABDOMINAL HYSTERECTOMY N/A 12/9/2020    Procedure: HYSTERECTOMY, TOTAL, ABDOMINAL;  Surgeon: Yun Recio MD;  Location: [facility] OR;  Service: OB/GYN;  Laterality: N/A;    UTERINE FIBROID SURGERY       Family History   Problem Relation Name Age of Onset    Asthma Mother      Hypertension Mother      COPD Father      Diabetes  "Father      Hypertension Father      Cancer Maternal Uncle      Colon cancer Neg Hx       Social History     Tobacco Use    Smoking status: Never    Smokeless tobacco: Never   Substance Use Topics    Alcohol use: Yes     Comment: Socially    Drug use: No     Review of patient's allergies indicates:  No Known Allergies    Current Outpatient Medications on File Prior to Visit   Medication Sig Dispense Refill    cetirizine (ZYRTEC) 10 MG tablet Take 10 mg by mouth daily as needed.       cholecalciferol, vitamin D3, 5,000 unit Tab Take 5,000 Units by mouth once daily.      citalopram (CELEXA) 20 MG tablet Take 20 mg by mouth once daily.      EVAMIST 1.53 mg/spray (1.7%) transdermal spray SPRAY 2 SPRAYS TO FOREARM DAILY      fluticasone (FLONASE) 50 mcg/actuation nasal spray 1 spray (50 mcg total) by Each Nare route once daily. 16 g 0    hydroCHLOROthiazide (HYDRODIURIL) 25 MG tablet Take 25 mg by mouth once daily.      niacin 50 MG Tab niacin      niacin 50 MG Tab       omega 3-dha-epa-fish oil (FISH OIL) 1,000 mg (120 mg-180 mg) Cap Fish Oil      rosuvastatin (CRESTOR) 10 MG tablet       HYDROcodone-acetaminophen (NORCO) 5-325 mg per tablet Take 1 tablet by mouth every 6 (six) hours as needed for Pain. 15 tablet 0    ibuprofen (ADVIL,MOTRIN) 800 MG tablet Take 1 tablet (800 mg total) by mouth 3 (three) times daily. 30 tablet 0     No current facility-administered medications on file prior to visit.     Physical Examination  /89 (BP Location: Left arm, Patient Position: Sitting, BP Method: X-Large (Automatic))   Pulse 106   Resp 18   Ht 5' 7" (1.702 m)   Wt 109.8 kg (242 lb 2.8 oz)   LMP 11/18/2020   BMI 37.93 kg/m²      A chaperone was present for the physical examination.    Constitutional: well developed, no cough, no dyspnea, alert, and no acute distress    Head: Normocephalic, no lesions, without obvious abnormality  Eye: Normal external eye, conjunctiva, and lids  Cardiovascular: regular rate and " regular rhythm  Respiratory: normal air entry  Gastrointestinal: soft, non-tender    Perianal Skin: Normal  Digital Rectal Exam:  Normal resting tone  Normal squeeze pressure   Relaxation with bear down is present  Mass(es) appreciated: anterior mass, 50% circumference, fixed, on vaginal exam - no mass appreciated    Musculoskeletal: full range of motion without pain  Neurologic: alert, oriented, normal speech, no focal findings or movement disorder noted  Psychiatric: appropriate, normal mood    Flexible Sigmoidoscopy Procedure Note    Procedure: Flexible Sigmoidoscopy    Pre-operative Diagnosis: Rectal adenocarcinoma    Post-operative Diagnosis: Same as above    Procedure Details     Informed consent was obtained for the procedure. Risks of perforation and hemorrhage were discussed. The patient was placed in the left lateral decubitus position, the anal region was examined, a digital rectal examination performed, then the 60 cm flexible sigmoidoscope was inserted and advanced without difficulty to a distance of 30 cm. The preparation was adequate. The instrument was then withdrawn.    Findings: Ulcerated 3 cm low rectal tumor extending into anal canal, about 2 cm from AV, see Provation           Complications: None     Assessment and Plan of Care    Thank you again for referring Ms. Kimble to my care. In summary, Ms. Kimble is a 48 year old woman presenting with rectal adenocarcinoma. We discussed treatment options and have provided the following recommendations:    Recommend CT CAP - scheduled this week  We reviewed her imaging and clinical exam today as well as discuss multimodal treatment of rectal adenocarcinoma. We have recommended long-course DAVE > restaging. We did discuss surgical management and watch and wait protocol which will be dictated by post-treatment restaging.  Follow-up with me 1 month after DAVE with repeat MRI and CT or according to trial protocol    Please do not hesitate to contact me if you have  any questions.      Sabino Menendez MD, FACS, FASCRS  Department of Colon & Rectal Surgery  [facility]

## 2024-07-15 NOTE — TELEPHONE ENCOUNTER
Lm to remind pt of her appt on tomorrow with Dr. Menendez at 2:40pm. Call back number and location provided.

## 2024-07-16 ENCOUNTER — OFFICE VISIT (OUTPATIENT)
Dept: HEMATOLOGY/ONCOLOGY | Facility: CLINIC | Age: 49
End: 2024-07-16
Payer: COMMERCIAL

## 2024-07-16 ENCOUNTER — OFFICE VISIT (OUTPATIENT)
Dept: RADIATION ONCOLOGY | Facility: CLINIC | Age: 49
End: 2024-07-16
Payer: COMMERCIAL

## 2024-07-16 ENCOUNTER — OFFICE VISIT (OUTPATIENT)
Dept: SURGERY | Facility: CLINIC | Age: 49
End: 2024-07-16
Payer: COMMERCIAL

## 2024-07-16 ENCOUNTER — LAB VISIT (OUTPATIENT)
Dept: LAB | Facility: HOSPITAL | Age: 49
End: 2024-07-16
Attending: INTERNAL MEDICINE
Payer: COMMERCIAL

## 2024-07-16 VITALS
DIASTOLIC BLOOD PRESSURE: 85 MMHG | TEMPERATURE: 98 F | SYSTOLIC BLOOD PRESSURE: 139 MMHG | WEIGHT: 242.31 LBS | HEIGHT: 67 IN | BODY MASS INDEX: 38.03 KG/M2 | HEART RATE: 83 BPM | OXYGEN SATURATION: 96 %

## 2024-07-16 VITALS
HEART RATE: 83 BPM | DIASTOLIC BLOOD PRESSURE: 85 MMHG | TEMPERATURE: 98 F | SYSTOLIC BLOOD PRESSURE: 139 MMHG | WEIGHT: 242.31 LBS | OXYGEN SATURATION: 96 % | BODY MASS INDEX: 38.03 KG/M2 | HEIGHT: 67 IN

## 2024-07-16 VITALS
HEART RATE: 106 BPM | SYSTOLIC BLOOD PRESSURE: 132 MMHG | HEIGHT: 67 IN | WEIGHT: 242.19 LBS | BODY MASS INDEX: 38.01 KG/M2 | RESPIRATION RATE: 18 BRPM | DIASTOLIC BLOOD PRESSURE: 89 MMHG

## 2024-07-16 DIAGNOSIS — E78.5 HYPERLIPIDEMIA, UNSPECIFIED HYPERLIPIDEMIA TYPE: ICD-10-CM

## 2024-07-16 DIAGNOSIS — C20 RECTAL CANCER: Primary | ICD-10-CM

## 2024-07-16 DIAGNOSIS — C20 RECTAL CANCER: ICD-10-CM

## 2024-07-16 DIAGNOSIS — J30.9 ALLERGIC RHINITIS, UNSPECIFIED SEASONALITY, UNSPECIFIED TRIGGER: ICD-10-CM

## 2024-07-16 DIAGNOSIS — F41.1 GENERALIZED ANXIETY DISORDER: ICD-10-CM

## 2024-07-16 DIAGNOSIS — D50.9 MICROCYTIC ANEMIA: ICD-10-CM

## 2024-07-16 DIAGNOSIS — K58.1 IRRITABLE BOWEL SYNDROME WITH CONSTIPATION: ICD-10-CM

## 2024-07-16 DIAGNOSIS — C20 RECTAL ADENOCARCINOMA: Primary | ICD-10-CM

## 2024-07-16 DIAGNOSIS — I10 HYPERTENSION, UNSPECIFIED TYPE: ICD-10-CM

## 2024-07-16 LAB
ALBUMIN SERPL BCP-MCNC: 4.1 G/DL (ref 3.5–5.2)
ALP SERPL-CCNC: 109 U/L (ref 55–135)
ALT SERPL W/O P-5'-P-CCNC: 15 U/L (ref 10–44)
ANION GAP SERPL CALC-SCNC: 11 MMOL/L (ref 8–16)
AST SERPL-CCNC: 16 U/L (ref 10–40)
BILIRUB SERPL-MCNC: 0.4 MG/DL (ref 0.1–1)
BUN SERPL-MCNC: 13 MG/DL (ref 6–20)
CALCIUM SERPL-MCNC: 9.9 MG/DL (ref 8.7–10.5)
CEA SERPL-MCNC: 2.9 NG/ML (ref 0–5)
CHLORIDE SERPL-SCNC: 101 MMOL/L (ref 95–110)
CO2 SERPL-SCNC: 28 MMOL/L (ref 23–29)
CREAT SERPL-MCNC: 0.8 MG/DL (ref 0.5–1.4)
ERYTHROCYTE [DISTWIDTH] IN BLOOD BY AUTOMATED COUNT: 14.4 % (ref 11.5–14.5)
EST. GFR  (NO RACE VARIABLE): >60 ML/MIN/1.73 M^2
FERRITIN SERPL-MCNC: 16 NG/ML (ref 20–300)
GLUCOSE SERPL-MCNC: 107 MG/DL (ref 70–110)
HCT VFR BLD AUTO: 42.1 % (ref 37–48.5)
HGB BLD-MCNC: 13.4 G/DL (ref 12–16)
IMM GRANULOCYTES # BLD AUTO: 0.03 K/UL (ref 0–0.04)
IRON SERPL-MCNC: 73 UG/DL (ref 30–160)
MCH RBC QN AUTO: 26.1 PG (ref 27–31)
MCHC RBC AUTO-ENTMCNC: 31.8 G/DL (ref 32–36)
MCV RBC AUTO: 82 FL (ref 82–98)
NEUTROPHILS # BLD AUTO: 6.9 K/UL (ref 1.8–7.7)
PLATELET # BLD AUTO: 273 K/UL (ref 150–450)
PMV BLD AUTO: 11.3 FL (ref 9.2–12.9)
POTASSIUM SERPL-SCNC: 4.3 MMOL/L (ref 3.5–5.1)
PROT SERPL-MCNC: 7.4 G/DL (ref 6–8.4)
RBC # BLD AUTO: 5.14 M/UL (ref 4–5.4)
SATURATED IRON: 16 % (ref 20–50)
SODIUM SERPL-SCNC: 140 MMOL/L (ref 136–145)
TOTAL IRON BINDING CAPACITY: 463 UG/DL (ref 250–450)
TRANSFERRIN SERPL-MCNC: 313 MG/DL (ref 200–375)
WBC # BLD AUTO: 10.12 K/UL (ref 3.9–12.7)

## 2024-07-16 PROCEDURE — 3075F SYST BP GE 130 - 139MM HG: CPT | Mod: CPTII,S$GLB,, | Performed by: INTERNAL MEDICINE

## 2024-07-16 PROCEDURE — 1159F MED LIST DOCD IN RCRD: CPT | Mod: CPTII,S$GLB,, | Performed by: STUDENT IN AN ORGANIZED HEALTH CARE EDUCATION/TRAINING PROGRAM

## 2024-07-16 PROCEDURE — 85027 COMPLETE CBC AUTOMATED: CPT | Performed by: INTERNAL MEDICINE

## 2024-07-16 PROCEDURE — 84466 ASSAY OF TRANSFERRIN: CPT | Performed by: INTERNAL MEDICINE

## 2024-07-16 PROCEDURE — 82378 CARCINOEMBRYONIC ANTIGEN: CPT | Performed by: INTERNAL MEDICINE

## 2024-07-16 PROCEDURE — 3079F DIAST BP 80-89 MM HG: CPT | Mod: CPTII,S$GLB,, | Performed by: STUDENT IN AN ORGANIZED HEALTH CARE EDUCATION/TRAINING PROGRAM

## 2024-07-16 PROCEDURE — 99205 OFFICE O/P NEW HI 60 MIN: CPT | Mod: S$GLB,,, | Performed by: STUDENT IN AN ORGANIZED HEALTH CARE EDUCATION/TRAINING PROGRAM

## 2024-07-16 PROCEDURE — 99999 PR PBB SHADOW E&M-EST. PATIENT-LVL III: CPT | Mod: PBBFAC,,, | Performed by: STUDENT IN AN ORGANIZED HEALTH CARE EDUCATION/TRAINING PROGRAM

## 2024-07-16 PROCEDURE — 99205 OFFICE O/P NEW HI 60 MIN: CPT | Mod: S$GLB,,, | Performed by: INTERNAL MEDICINE

## 2024-07-16 PROCEDURE — G2211 COMPLEX E/M VISIT ADD ON: HCPCS | Mod: S$GLB,,, | Performed by: INTERNAL MEDICINE

## 2024-07-16 PROCEDURE — 99999 PR PBB SHADOW E&M-EST. PATIENT-LVL IV: CPT | Mod: PBBFAC,,, | Performed by: INTERNAL MEDICINE

## 2024-07-16 PROCEDURE — 3008F BODY MASS INDEX DOCD: CPT | Mod: CPTII,S$GLB,, | Performed by: STUDENT IN AN ORGANIZED HEALTH CARE EDUCATION/TRAINING PROGRAM

## 2024-07-16 PROCEDURE — 83540 ASSAY OF IRON: CPT | Performed by: INTERNAL MEDICINE

## 2024-07-16 PROCEDURE — 3079F DIAST BP 80-89 MM HG: CPT | Mod: CPTII,S$GLB,, | Performed by: INTERNAL MEDICINE

## 2024-07-16 PROCEDURE — 3008F BODY MASS INDEX DOCD: CPT | Mod: CPTII,S$GLB,, | Performed by: INTERNAL MEDICINE

## 2024-07-16 PROCEDURE — 3075F SYST BP GE 130 - 139MM HG: CPT | Mod: CPTII,S$GLB,, | Performed by: STUDENT IN AN ORGANIZED HEALTH CARE EDUCATION/TRAINING PROGRAM

## 2024-07-16 PROCEDURE — 80053 COMPREHEN METABOLIC PANEL: CPT | Performed by: INTERNAL MEDICINE

## 2024-07-16 PROCEDURE — 36415 COLL VENOUS BLD VENIPUNCTURE: CPT | Performed by: INTERNAL MEDICINE

## 2024-07-16 PROCEDURE — 99204 OFFICE O/P NEW MOD 45 MIN: CPT | Mod: 25,S$GLB,, | Performed by: STUDENT IN AN ORGANIZED HEALTH CARE EDUCATION/TRAINING PROGRAM

## 2024-07-16 PROCEDURE — 1160F RVW MEDS BY RX/DR IN RCRD: CPT | Mod: CPTII,S$GLB,, | Performed by: STUDENT IN AN ORGANIZED HEALTH CARE EDUCATION/TRAINING PROGRAM

## 2024-07-16 PROCEDURE — 82728 ASSAY OF FERRITIN: CPT | Performed by: INTERNAL MEDICINE

## 2024-07-16 RX ORDER — ESTRADIOL 1.53 MG/1
SPRAY TRANSDERMAL
COMMUNITY
Start: 2023-03-24

## 2024-07-16 RX ORDER — NIACIN 50 MG
TABLET ORAL
COMMUNITY

## 2024-07-16 RX ORDER — ROSUVASTATIN CALCIUM 10 MG/1
TABLET, COATED ORAL
COMMUNITY
Start: 2023-03-24

## 2024-07-16 RX ORDER — GLUCOSAM/CHONDRO/HERB 149/HYAL 750-100 MG
TABLET ORAL
COMMUNITY

## 2024-07-16 NOTE — PROGRESS NOTES
MEDICAL ONCOLOGY - NEW PATIENT VISIT    Reason for visit: rectal cancer     Best Contact Phone Number(s): [phone] (home)      Cancer/Stage/TNM:    Cancer Staging   Rectal adenocarcinoma  Staging form: Colon and Rectum, AJCC 8th Edition  - Clinical stage from 6/21/2024: cT2, cN2b - Unsigned       Oncology History   Rectal adenocarcinoma   6/21/2024 Procedure    Colonoscopy - mild diverticular disease, mass in distal rectum (Complete)     6/24/2024 Initial Diagnosis    Rectal adenocarcinoma  ERROL  - Colonoscopy 6/21/24: infiltrative, ulcerated, sub-mucosal, fungating and villous bleeding mass found in distal rectum, 6-7cm from the anus proximally.   - Distal rectal mass biopsy 6/21/24: well differentiated adenocarcinoma, negative for LVI and perineural invasion. IHC MSI-H low probability with intact nuclear expressions of MLH1, MSH2, MSH6, and PMS2.        7/15/2024 Imaging Significant Findings    MRI Rectal cancer  Low rectal tumor invading the internal sphincter in close approximation to the mesorectal fascia without definite involvement.  Multiple suspicious mesorectal and superior rectal nodes.     *MR STAGE     T: T1/T2 (tumor confined to rectal wall)     N: N+ (short axis ? 9 mm)     Blayne statement: Multiple suspicious mesorectal and superior rectal nodes.     *MRF: threatened (tumor margin within 1-2 mm of MRF) noting this is a low rectal tumor.  No definite evidence of invasion.     Sphincter Involvement: Yes involvement the internal sphincter     Suspicious Extramesorectal Lymph Nodes: No     EMVI: No              HPI:   48 y.o. female with HTN, HLD, ANANT, allergic rhinitis who presents for further evaluation of newly diagnosed rectal cancer.     She was in her usual health and went to PCP for annual screening earlier this year. PCP recommended screening colonoscopy as she is >45. 2 weeks prior to her screening colonoscopy, she noticed hematochezia and rectal pain. Her only symptom prior to this was  constipation, which is lifelong.       Family History: 4 uncles and and aunt with cancer, including melanoma, esophageal, throat, unknown, and RCC.   Social History: . Lifelong nonsmoker. Social alcohol use. Denies illicit drug use.     Patient presents alone.  ECOG PS is 0.  History has been obtained by chart review and discussion with the patient.    ROS:   Review of Systems   Constitutional:  Positive for malaise/fatigue and weight loss. Negative for fever.   HENT:  Negative for hearing loss.    Eyes:  Negative for blurred vision.   Respiratory:  Negative for cough and hemoptysis.    Cardiovascular:  Negative for chest pain.   Gastrointestinal:  Positive for blood in stool and constipation. Negative for abdominal pain, nausea and vomiting.        Rectal bleed, rectal pain / discomfort    Genitourinary:  Negative for dysuria, frequency and urgency.   Musculoskeletal:  Negative for myalgias.   Skin:  Negative for rash.   Neurological:  Negative for dizziness, tingling, weakness and headaches.   Endo/Heme/Allergies:  Does not bruise/bleed easily.   Psychiatric/Behavioral:  Negative for depression.        Past Medical History:   Past Medical History:   Diagnosis Date    Anxiety disorder, unspecified     Fibroids     HTN (hypertension)     Sinus problem         Past Surgical History:   Past Surgical History:   Procedure Laterality Date    BILATERAL SALPINGO-OOPHORECTOMY (BSO) Bilateral 12/9/2020    Procedure: SALPINGO-OOPHORECTOMY, BILATERAL;  Surgeon: Yun Recio MD;  Location: [facility] OR;  Service: OB/GYN;  Laterality: Bilateral;  COVID NEGATIVE 12/3    INCISION OF UVULA      knee arhroscopy      NASAL SEPTUM SURGERY      SINUS SURGERY      TONSILLECTOMY      TOTAL ABDOMINAL HYSTERECTOMY N/A 12/9/2020    Procedure: HYSTERECTOMY, TOTAL, ABDOMINAL;  Surgeon: Yun Recio MD;  Location: [facility] OR;  Service: OB/GYN;  Laterality: N/A;    UTERINE FIBROID SURGERY          Family History:   Family History  "  Problem Relation Name Age of Onset    Asthma Mother      Hypertension Mother      COPD Father      Diabetes Father      Hypertension Father      Cancer Maternal Uncle      Colon cancer Neg Hx          Social History:   Social History     Tobacco Use    Smoking status: Never    Smokeless tobacco: Never   Substance Use Topics    Alcohol use: Yes     Comment: Socially        I have reviewed and updated the patient's past medical, surgical, family and social histories.    Allergies:   Review of patient's allergies indicates:  No Known Allergies     Medications:   Current Outpatient Medications   Medication Sig Dispense Refill    cetirizine (ZYRTEC) 10 MG tablet Take 10 mg by mouth daily as needed.       cholecalciferol, vitamin D3, 5,000 unit Tab Take 5,000 Units by mouth once daily.      citalopram (CELEXA) 20 MG tablet Take 20 mg by mouth once daily.      fluticasone (FLONASE) 50 mcg/actuation nasal spray 1 spray (50 mcg total) by Each Nare route once daily. 16 g 0    hydroCHLOROthiazide (HYDRODIURIL) 25 MG tablet Take 25 mg by mouth once daily.      EVAMIST 1.53 mg/spray (1.7%) transdermal spray SPRAY 2 SPRAYS TO FOREARM DAILY      HYDROcodone-acetaminophen (NORCO) 5-325 mg per tablet Take 1 tablet by mouth every 6 (six) hours as needed for Pain. 15 tablet 0    ibuprofen (ADVIL,MOTRIN) 800 MG tablet Take 1 tablet (800 mg total) by mouth 3 (three) times daily. 30 tablet 0    niacin 50 MG Tab niacin      niacin 50 MG Tab       omega 3-dha-epa-fish oil (FISH OIL) 1,000 mg (120 mg-180 mg) Cap Fish Oil      rosuvastatin (CRESTOR) 10 MG tablet        No current facility-administered medications for this visit.        Physical Exam:   /85 (BP Location: Left arm, Patient Position: Sitting, BP Method: Large (Automatic))   Pulse 83   Temp 98.2 °F (36.8 °C) (Rectal)   Ht 5' 7" (1.702 m)   Wt 109.9 kg (242 lb 4.6 oz)   LMP 11/18/2020   SpO2 96%   BMI 37.95 kg/m²                Physical Exam  Constitutional:       " General: She is not in acute distress.     Appearance: Normal appearance. She is normal weight. She is not ill-appearing.   HENT:      Head: Normocephalic and atraumatic.      Mouth/Throat:      Mouth: Mucous membranes are moist.      Pharynx: Oropharynx is clear.   Eyes:      Extraocular Movements: Extraocular movements intact.      Conjunctiva/sclera: Conjunctivae normal.      Pupils: Pupils are equal, round, and reactive to light.   Abdominal:      Palpations: Abdomen is soft.   Musculoskeletal:      Comments: BLE swelling but no edema    Neurological:      Mental Status: She is alert.           Labs:   Recent Results (from the past 48 hour(s))   CBC Oncology    Collection Time: 07/16/24 12:18 PM   Result Value Ref Range    WBC 10.12 3.90 - 12.70 K/uL    RBC 5.14 4.00 - 5.40 M/uL    Hemoglobin 13.4 12.0 - 16.0 g/dL    Hematocrit 42.1 37.0 - 48.5 %    MCV 82 82 - 98 fL    MCH 26.1 (L) 27.0 - 31.0 pg    MCHC 31.8 (L) 32.0 - 36.0 g/dL    RDW 14.4 11.5 - 14.5 %    Platelets 273 150 - 450 K/uL    MPV 11.3 9.2 - 12.9 fL    Gran # (ANC) 6.9 1.8 - 7.7 K/uL    Immature Grans (Abs) 0.03 0.00 - 0.04 K/uL   Comprehensive Metabolic Panel    Collection Time: 07/16/24 12:18 PM   Result Value Ref Range    Sodium 140 136 - 145 mmol/L    Potassium 4.3 3.5 - 5.1 mmol/L    Chloride 101 95 - 110 mmol/L    CO2 28 23 - 29 mmol/L    Glucose 107 70 - 110 mg/dL    BUN 13 6 - 20 mg/dL    Creatinine 0.8 0.5 - 1.4 mg/dL    Calcium 9.9 8.7 - 10.5 mg/dL    Total Protein 7.4 6.0 - 8.4 g/dL    Albumin 4.1 3.5 - 5.2 g/dL    Total Bilirubin 0.4 0.1 - 1.0 mg/dL    Alkaline Phosphatase 109 55 - 135 U/L    AST 16 10 - 40 U/L    ALT 15 10 - 44 U/L    eGFR >60.0 >60 mL/min/1.73 m^2    Anion Gap 11 8 - 16 mmol/L   CEA    Collection Time: 07/16/24 12:18 PM   Result Value Ref Range    CEA 2.9 0.0 - 5.0 ng/mL   FERRITIN    Collection Time: 07/16/24 12:18 PM   Result Value Ref Range    Ferritin 16 (L) 20.0 - 300.0 ng/mL   Iron and TIBC    Collection Time:  07/16/24 12:18 PM   Result Value Ref Range    Iron 73 30 - 160 ug/dL    Transferrin 313 200 - 375 mg/dL    TIBC 463 (H) 250 - 450 ug/dL    Saturated Iron 16 (L) 20 - 50 %        Imaging:    MRI Rectal Cancer Without Contrast    Result Date: 7/15/2024  EXAMINATION: MRI RECTAL CANCER WITHOUT CONTRAST CLINICAL HISTORY: Malignant neoplasm of rectum TECHNIQUE: Multiplanar multi sequence images of the pelvis were obtained per rectal tumor protocol. COMPARISON: None FINDINGS: COLONOSCOPY FINDINGS: N/a MRI FINDINGS: Tumor Location: Lower (0-5 cm) Tumor location (distance from anal verge): Low (0-5.0 cm) cm Distance from top of anal canal (level of puborectalis at anorectal junction) to the inferior edge of the tumor: 0 cm Relationship to anterior peritoneal reflection: Below Tumor Characteristics: Morphology: Semiannular Circumferential extent/location: 03 - 9 o'clock Tumor Length: 5.6 cm Mucinous: No *MR-T CATEGORY: T1/T2 (tumor confined to rectal wall) LOW RECTAL TUMORS: The lower extent of the tumor is at or below the upper border of the puborectalis sling. The most penetrating component of the tumor invades: internal sphincter only. *MESORECTAL FASCIA (MRF): The distance of the most penetrating component of tumor to the mesorectal fascia is 1-2 mm - no invasion noting this is a low rectal tumor. *LYMPH NODES: Mesorectal/superior rectal lymph nodes): Multiple suspicious mesorectal nodes as follows: -Few superior rectal nodes measuring 0.4 cm short axis with heterogeneous signal intensity and rounded morphology (sequence 2 image 16 and 17) -left mesorectal node measuring 0.7 cm in short axis with heterogeneous signal intensity in rounded morphology (sequence 5 image 34) -2 left mesorectal node measuring 0.5 cm in short axis with heterogeneous signal intensity (sequence 5 image 32, 33). -left mesorectal node measuring 0.5 cm in short axis with irregular borders and heterogeneous signal intensity (sequence 5 image 33). -3  right mesorectal nodes measuring 0.4 cm in short axis with heterogeneous signal intensity and rounded morphology (sequence 5 image 31, 33, 26,). *MR-N CATEGORY: N+ (short axis 5-9 mm AND at least 2 morphologic criteria) Extra-mesorectal nodes (Int. Iliac/Obturator >= 7 mm): No nodes measuring above 7 mm Non-locoregional nodes (Ext. Iliac/Inguinal > 1cm): None *EXTRAMURAL VENOUS INVASION (EMVI): Negative. No tumor extension in the vicinity of any vascular structure. Include the distance and clock face position of the EMVI in relation to the MRF Other: Sigmoid diverticulosis.  Postop changes of hysterectomy.     Low rectal tumor invading the internal sphincter in close approximation to the mesorectal fascia without definite involvement.  Multiple suspicious mesorectal and superior rectal nodes. *MR STAGE T: T1/T2 (tumor confined to rectal wall) N: N+ (short axis ? 9 mm) Blayne statement: Multiple suspicious mesorectal and superior rectal nodes. *MRF: threatened (tumor margin within 1-2 mm of MRF) noting this is a low rectal tumor.  No definite evidence of invasion. Sphincter Involvement: Yes involvement the internal sphincter Suspicious Extramesorectal Lymph Nodes: No EMVI: No Electronically signed by resident: Gloria Knapp Date:    07/15/2024 Time:    11:14 Electronically signed by: Homer Yanez MD Date:    07/15/2024 Time:    12:48      Path:   Distal rectal mass biopsy 6/21/24: well differentiated adenocarcinoma, negative for LVI and perineural invasion. IHC MSI-H low probability with intact nuclear expressions of MLH1, MSH2, MSH6, and PMS2.         Assessment:       1. Rectal cancer    2. Microcytic anemia    3. Generalized anxiety disorder    4. Hypertension, unspecified type    5. Hyperlipidemia, unspecified hyperlipidemia type    6. Allergic rhinitis, unspecified seasonality, unspecified trigger    7. Irritable bowel syndrome with constipation          Plan:             # Rectal adenocarcinoma,  incomplete staging   - CT CAP to complete staging   - Checking CBC, CMP, CEA, and Pharmacogenomics panel   - Refer to RadOnc   - Extensive discussion about prognosis and treatment options which will be ultimately decided after CT CAP results     # Microcytic anemia   Suspect MICHELLE.   - Checking iron panel and ferritin       Other chronic disease managed by PCP   # Generalized anxiety disorder - continue citalopram   # HTN - HCTZ   # HLD - statin  # allergic rhinitis - zyrtec and flonase, stable   # IBS-Constipation     Follow up: after CT CAP result      The above information has been reviewed with the patient and all questions have been answered to their apparent satisfaction.  She understands that she can call the clinic with any questions.    Jose Randhawa MD  Hematology/Oncology Fellow  [facility] [place]

## 2024-07-16 NOTE — PROGRESS NOTES
[facility] - Radiation Oncology Consult Note          Date of Service: 07/16/2024     Chief Complaint: rectal cancer     Reason for visit: consideration for radiation to the pelvis     Referring Physician: Dr Menendez (CRS)     Implantable devices: denies     Therapy to Date:  No radiation     Diagnosis/Assessment:   Imelda Kimble is a 48 y.o. woman with at least Stage IIIB (cT2, cN2b, cM0) distal G1 adenocarcinoma pMMR, 5.6 cm long, below the anterior peritoneal reflection, MRF threatened, with involvement the internal sphincter, EMVI-, with multiple (8+) small suspicious mesorectal and superior rectal nodes. Pending staging CT.    PMH hysterectomy/BSO, anxiety, HTN, Left knee replacement     ECOG 0, mostly asymptomatic with minor rectal bleeding     Plan   I discussed with Dr Rudolph and Dr Menendez, and provided the staging scans show no signs of distant metastases, we recommend long course radiation therapy with Xeloda and 25-30 daily fractions M-F, using IMRT to decrease small bowel and bladder dose.    Patient also appears to be candidate for the Janus rectal cancer trial ([place] [id number]) with DAVE testing FOLFOX with or without Irinotecan     GI INFORMED CONSENT: Acute and delayed side effects of gastrointestinal radiation, including but not limited to fatigue, redness of the skin, desquamation, dysphagia, odynophagia, nausea, vomiting, diarrhea, incontinence, infection as well as rare but catastrophic injury to the spinal cord and digestive tract were discussed with the patient in great detail today.     I reviewed the rationale and goal of the proposed treatment course. The radiotherapeutic procedure with associated side effects and potential complications were explained. They had the opportunity to ask questions which were answered to the best of my knowledge.   The patient voiced understanding of the above and has elected to proceed with treatment.   RT consent form was signed.    They were also given our contact information should further questions or concerns arise.     - pending staging scans on 7/17/2024  - recommended Miralax maintenance starting now  - CT simulation scan 7/19/2024 full+empty bladder, barium past over anus, frogleg position, vaginal dilator, xeloda chemo pills, start date TBD  - RT consent signed  - Xeloda per Dr Rudolph  - present at [facility] lower GI Tumor Board       HPI:   Imelda Kimble is a 48 y.o. woman with recent diagnosis of rectal cancer after presenting with screening colonoscopy    Oncologic history:  06/21/24 colonoscopy (Dr Rasheed)   Mild diverticulosis   Infiltrative ulcerated submucosal fungating and villous bleeding mass in distal rectum, spanning from anal verge to 6-7cm proximally   Pathology: G1 adenocarcinoma pMMR, without PNI or LVI    7/15/2024 MRI rectum  5.6 cm long low rectal lesion, below the anterior peritoneal reflection, MRF threatened, with involvement the internal sphincter, EMVI-, T1/T2, with multiple (8+) small suspicious mesorectal and superior rectal nodes.          Subjective:   In clinic the patient is alone    The patient reports feeling well overall.    She reports constipation and rectal bleeding. No diarrhea, abdominal pain, nausea or vomiting or urinary issues. She reports some weight loss.  She has QOD BMs    The patient denies other major complaints such as pain.     The patient denies any history of radiation therapy, implantable cardiac devices, or connective tissue disease.     Social history  From [place], single,  at [employer] at  [facility] [employer] in [place], [place].  (went to school at [employer] in )     Family History   Problem Relation Name Age of Onset    Asthma Mother      Hypertension Mother      COPD Father      Diabetes Father      Hypertension Father      Cancer Maternal Uncle      Colon cancer Neg Hx         Current Outpatient  Medications on File Prior to Visit   Medication Sig Dispense Refill    cetirizine (ZYRTEC) 10 MG tablet Take 10 mg by mouth daily as needed.       cholecalciferol, vitamin D3, 5,000 unit Tab Take 5,000 Units by mouth once daily.      citalopram (CELEXA) 20 MG tablet Take 20 mg by mouth once daily.      fluticasone (FLONASE) 50 mcg/actuation nasal spray 1 spray (50 mcg total) by Each Nare route once daily. 16 g 0    hydroCHLOROthiazide (HYDRODIURIL) 25 MG tablet Take 25 mg by mouth once daily.      HYDROcodone-acetaminophen (NORCO) 5-325 mg per tablet Take 1 tablet by mouth every 6 (six) hours as needed for Pain. 15 tablet 0    ibuprofen (ADVIL,MOTRIN) 800 MG tablet Take 1 tablet (800 mg total) by mouth 3 (three) times daily. 30 tablet 0     No current facility-administered medications on file prior to visit.       Review of patient's allergies indicates:  No Known Allergies    Past Surgical History:   Procedure Laterality Date    BILATERAL SALPINGO-OOPHORECTOMY (BSO) Bilateral 12/9/2020    Procedure: SALPINGO-OOPHORECTOMY, BILATERAL;  Surgeon: Yun Recio MD;  Location: [facility] OR;  Service: OB/GYN;  Laterality: Bilateral;  COVID NEGATIVE 12/3    INCISION OF UVULA      knee arhroscopy      NASAL SEPTUM SURGERY      SINUS SURGERY      TONSILLECTOMY      TOTAL ABDOMINAL HYSTERECTOMY N/A 12/9/2020    Procedure: HYSTERECTOMY, TOTAL, ABDOMINAL;  Surgeon: Yun Recio MD;  Location: [facility] OR;  Service: OB/GYN;  Laterality: N/A;    UTERINE FIBROID SURGERY         Past Medical History:   Diagnosis Date    Anxiety disorder, unspecified     Fibroids     HTN (hypertension)     Sinus problem                 Review of Systems   Negative unless as above       Objective:   Physical Exam  Vitals reviewed.     Constitutional:       Appearance: Normal appearance.   HENT:      Head: Normocephalic and atraumatic.   Eyes:      Conjunctiva/sclera: Conjunctivae normal.   Cardiovascular:      Comments: extremities well  perfused  Pulmonary:      Effort: Pulmonary effort is normal.   Abdominal:      General: There is no distension.   Genitourinary:     Comments: KODY deferred  Musculoskeletal:         General: Normal range of motion.   Neurological:      General: No focal deficit present.      Mental Status: alert and oriented  Psychiatric:         Mood and Affect: Mood normal.         Behavior: Behavior normal.             Imaging: I have personally reviewed the patient's available images and reports and summarized pertinent findings above in HPI.      Pathology: I have personally reviewed the patient's available pathology and summarized pertinent findings above in HPI.      Laboratory: I have personally reviewed the patient's available laboratory values and summarized pertinent findings above in HPI.         I spent approximately 60 minutes reviewing the available records and evaluating the patient, out of which over 50% of the time was spent face to face with the patient in counseling and coordinating this patient's care.     Thank you for the opportunity to care for this patient. Please do not hesitate to contact me with any questions.     Kevin Lopez MD/PhD

## 2024-07-17 ENCOUNTER — TELEPHONE (OUTPATIENT)
Dept: HEMATOLOGY/ONCOLOGY | Facility: CLINIC | Age: 49
End: 2024-07-17
Payer: COMMERCIAL

## 2024-07-17 ENCOUNTER — HOSPITAL ENCOUNTER (OUTPATIENT)
Dept: RADIOLOGY | Facility: HOSPITAL | Age: 49
Discharge: HOME | End: 2024-07-17
Attending: INTERNAL MEDICINE
Payer: COMMERCIAL

## 2024-07-17 DIAGNOSIS — C20 RECTAL CANCER: Primary | ICD-10-CM

## 2024-07-17 DIAGNOSIS — C20 RECTAL CANCER: ICD-10-CM

## 2024-07-17 DIAGNOSIS — R91.1 LUNG NODULE SEEN ON IMAGING STUDY: ICD-10-CM

## 2024-07-17 PROCEDURE — 71260 CT THORAX DX C+: CPT | Mod: 26,,, | Performed by: RADIOLOGY

## 2024-07-17 PROCEDURE — 71260 CT THORAX DX C+: CPT | Mod: TC

## 2024-07-17 PROCEDURE — 74177 CT ABD & PELVIS W/CONTRAST: CPT | Mod: 26,,, | Performed by: RADIOLOGY

## 2024-07-17 PROCEDURE — 25500020 PHARM REV CODE 255: Performed by: INTERNAL MEDICINE

## 2024-07-17 RX ADMIN — IOHEXOL 100 ML: 350 INJECTION, SOLUTION INTRAVENOUS at 08:07

## 2024-07-17 RX ADMIN — IOHEXOL 30 ML: 350 INJECTION, SOLUTION INTRAVENOUS at 08:07

## 2024-07-17 NOTE — TELEPHONE ENCOUNTER
"----- Message from Escobar Horne sent at 7/17/2024 10:55 AM CDT -----  Consult/Advisory    Name Of Caller: Self    Contact Preference?: [phone]     Provider Name: Klaudia    Does patient feel the need to be seen today? No    What is the nature of the call?: Requesting clarification about today's CT results.     Additional Notes:  "Thank you for all that you do for our patients"  "

## 2024-07-19 ENCOUNTER — HOSPITAL ENCOUNTER (OUTPATIENT)
Dept: RADIOLOGY | Facility: HOSPITAL | Age: 49
Discharge: HOME | End: 2024-07-19
Attending: INTERNAL MEDICINE
Payer: COMMERCIAL

## 2024-07-19 ENCOUNTER — HOSPITAL ENCOUNTER (OUTPATIENT)
Dept: RADIATION THERAPY | Facility: HOSPITAL | Age: 49
Discharge: HOME | End: 2024-07-19
Attending: FAMILY MEDICINE
Payer: COMMERCIAL

## 2024-07-19 DIAGNOSIS — C20 RECTAL CANCER: ICD-10-CM

## 2024-07-19 DIAGNOSIS — R91.1 LUNG NODULE SEEN ON IMAGING STUDY: ICD-10-CM

## 2024-07-19 LAB — POCT GLUCOSE: 105 MG/DL (ref 70–110)

## 2024-07-19 PROCEDURE — 77263 THER RADIOLOGY TX PLNG CPLX: CPT | Mod: ,,, | Performed by: STUDENT IN AN ORGANIZED HEALTH CARE EDUCATION/TRAINING PROGRAM

## 2024-07-19 PROCEDURE — A9552 F18 FDG: HCPCS | Performed by: INTERNAL MEDICINE

## 2024-07-19 PROCEDURE — 78815 PET IMAGE W/CT SKULL-THIGH: CPT | Mod: 26,PI,, | Performed by: STUDENT IN AN ORGANIZED HEALTH CARE EDUCATION/TRAINING PROGRAM

## 2024-07-19 PROCEDURE — 77014 HC CT GUIDANCE RADIATION THERAPY FLDS PLACEMENT: CPT | Mod: TC | Performed by: STUDENT IN AN ORGANIZED HEALTH CARE EDUCATION/TRAINING PROGRAM

## 2024-07-19 PROCEDURE — 77014 PR  CT GUIDANCE PLACEMENT RAD THERAPY FIELDS: CPT | Mod: 26,,, | Performed by: STUDENT IN AN ORGANIZED HEALTH CARE EDUCATION/TRAINING PROGRAM

## 2024-07-19 PROCEDURE — 78815 PET IMAGE W/CT SKULL-THIGH: CPT | Mod: TC

## 2024-07-19 PROCEDURE — 77334 RADIATION TREATMENT AID(S): CPT | Mod: TC | Performed by: STUDENT IN AN ORGANIZED HEALTH CARE EDUCATION/TRAINING PROGRAM

## 2024-07-19 PROCEDURE — 77334 RADIATION TREATMENT AID(S): CPT | Mod: 26,,, | Performed by: STUDENT IN AN ORGANIZED HEALTH CARE EDUCATION/TRAINING PROGRAM

## 2024-07-19 RX ORDER — FLUDEOXYGLUCOSE F18 500 MCI/ML
11.7 INJECTION INTRAVENOUS
Status: COMPLETED | OUTPATIENT
Start: 2024-07-19 | End: 2024-07-19

## 2024-07-19 RX ADMIN — FLUDEOXYGLUCOSE F-18 11.7 MILLICURIE: 500 INJECTION INTRAVENOUS at 09:07

## 2024-07-22 DIAGNOSIS — C20 RECTAL ADENOCARCINOMA: Primary | ICD-10-CM

## 2024-07-22 LAB
ONEOME COMMENT: NORMAL
ONEOME METHOD: NORMAL

## 2024-07-23 ENCOUNTER — DOCUMENTATION ONLY (OUTPATIENT)
Dept: HEMATOLOGY/ONCOLOGY | Facility: CLINIC | Age: 49
End: 2024-07-23
Payer: COMMERCIAL

## 2024-07-23 NOTE — PROGRESS NOTES
Colorectal Tumor Board  Cancer Genetics Review        Patient ID:  Name Imelda Kimble     1975    MRN [id number]      Date of Tumor Board:  2024    Diagnosis: pMMR rectal cancer    Recommendation:    Referral to Cancer Genetics (REF26)  Indication: Patient meets criteria for genetic testing due to being diagnosed before age 50.      VIKASH Nelson, PA-C  Physician Assistant, Hereditary/High Risk Clinic  Hematology/Oncology, [facility]

## 2024-07-24 ENCOUNTER — TELEPHONE (OUTPATIENT)
Dept: SURGERY | Facility: CLINIC | Age: 49
End: 2024-07-24
Payer: COMMERCIAL

## 2024-07-24 ENCOUNTER — PATIENT MESSAGE (OUTPATIENT)
Dept: RADIATION ONCOLOGY | Facility: CLINIC | Age: 49
End: 2024-07-24
Payer: COMMERCIAL

## 2024-07-24 DIAGNOSIS — C20 RECTAL CANCER: Primary | ICD-10-CM

## 2024-07-24 DIAGNOSIS — C20 RECTAL ADENOCARCINOMA: ICD-10-CM

## 2024-07-24 DIAGNOSIS — C20 RECTAL ADENOCARCINOMA: Primary | ICD-10-CM

## 2024-07-24 DIAGNOSIS — C78.02 MALIGNANT NEOPLASM METASTATIC TO LEFT LUNG: ICD-10-CM

## 2024-07-24 NOTE — TELEPHONE ENCOUNTER
"Call placed to pt. Pt sees Dr. Rudolph and is requesting for Dr. Layton to place port. Pt reports "I know of him" (a friend of hers knows Dr. Layton personally). Informed pt referral placed to general surgery per Dr. Rudolph. Will notify Dr. Layton's nurse of port request.  Pt is ok if alternate general surgeon places port.  Encouraged pt if she receives call from general surgery department for port placement to proceed with scheduling. Pt verbalized understanding.  "

## 2024-07-24 NOTE — TELEPHONE ENCOUNTER
----- Message from Lila Roque sent at 7/24/2024  1:10 PM CDT -----  Regarding: Pt returning call  Contact: [phone]  Good afternoon,     Pt called requesting a call back from clinical staff for an appointment .  Dr. Layton is her Surgeon, and she would like Calin Beltrán to put in her port.  Pt said she think the staff called her not longer ago, and she is asking for a call back.    Thank you,   Nhi Cooney Navigator

## 2024-07-24 NOTE — LETTER
July 24, 2024        Dion Rudolph MD  [address]  [facility] [address]             [facility] [address]  [address]  [facility] [address]  Phone: [phone]   Patient: Imelda Kimble   MR Number: [id number]   YOB: 1975   Date of Visit: 7/24/2024       Dear Dr. Rudolph:    Please see telephone note.     If you have questions, please do not hesitate to call me. I look forward to following Imelda along with you.    Sincerely,    Sabino Menendez MD     CC  Kevin Lopez MD

## 2024-07-24 NOTE — TELEPHONE ENCOUNTER
Discussed MDT review of her care, reviewed results of PET CT and concerns of likely metastatic disease  Recommended upfront chemotherapy > restaging > likely chemoradiation  Will send this note today to Dr. Rudolph to arrange this  She knows to call if she has any issues      Sabino Menendez MD, FACS, FASCRS  Department of Colon & Rectal Surgery  [facility]

## 2024-07-26 ENCOUNTER — TELEPHONE (OUTPATIENT)
Dept: SURGERY | Facility: CLINIC | Age: 49
End: 2024-07-26
Payer: COMMERCIAL

## 2024-07-26 ENCOUNTER — ANESTHESIA EVENT (OUTPATIENT)
Dept: SURGERY | Facility: HOSPITAL | Age: 49
End: 2024-07-26
Payer: COMMERCIAL

## 2024-07-26 NOTE — ANESTHESIA PREPROCEDURE EVALUATION
[facility]  Anesthesia Pre-Operative Evaluation   07/26/2024        Imelda Kimble, 1975  [id number]  Procedure(s) (LRB):  INSERTION, SINGLE LUMEN CATHETER WITH PORT, WITH FLUOROSCOPIC GUIDANCE, left poss right (Left)    Subjective    Imelda Kimble is a 48 y.o. female w/ a significant PMHx of HTN, anxiety, BMI 38, and rectal adenocarcinoma. Plans for chemotherapy.    Patient now presents for above procedure(s).       ECHO:  No results found for this or any previous visit.      Prev Airway:     Intubation  Performed by: Timothy Easton CRNA  Authorized by: Aaron Moore II, MD      Intubation:     Induction:  Rapid sequence induction    Intubated:  Postinduction    Mask Ventilation:  N/a    Attempts:  1    Attempted By:  CRNA    Method of Intubation:  Direct    Blade:  Kate 3    Laryngeal View Grade: Grade I - full view of chords      Difficult Airway Encountered?: No      Complications:  None    Airway Device:  Direct and oral endotracheal tube    Airway Device Size:  7.0    Style/Cuff Inflation:  Cuffed (inflated to minimal occlusive pressure)    Inflation Amount (mL):  7    Tube secured:  22    Secured at:  The lips    Placement Verified By:  Auscultation and Capnometry    Complicating Factors:  None    Findings Post-Intubation:  Bilateral breath sounds, positive ETCO2 and atraumatic / condition of teeth unchanged               LDA: None documented.       Drips: None documented.      Patient Active Problem List   Diagnosis    Rectal adenocarcinoma    Malignant neoplasm metastatic to left lung       Review of patient's allergies indicates:  No Known Allergies    Current Inpatient Medications:       No current facility-administered medications on file prior to encounter.     Current Outpatient Medications on File Prior to Encounter   Medication Sig Dispense Refill    citalopram (CELEXA) 20 MG tablet Take 20 mg by mouth every morning.      rosuvastatin (CRESTOR) 10 MG tablet Take by mouth  every evening.      cetirizine (ZYRTEC) 10 MG tablet Take 10 mg by mouth daily as needed.       cholecalciferol, vitamin D3, 5,000 unit Tab Take 5,000 Units by mouth once daily.      EVAMIST 1.53 mg/spray (1.7%) transdermal spray SPRAY 2 SPRAYS TO FOREARM DAILY      fluticasone (FLONASE) 50 mcg/actuation nasal spray 1 spray (50 mcg total) by Each Nare route once daily. 16 g 0    hydroCHLOROthiazide (HYDRODIURIL) 25 MG tablet Take 25 mg by mouth every morning.      HYDROcodone-acetaminophen (NORCO) 5-325 mg per tablet Take 1 tablet by mouth every 6 (six) hours as needed for Pain. 15 tablet 0    ibuprofen (ADVIL,MOTRIN) 800 MG tablet Take 1 tablet (800 mg total) by mouth 3 (three) times daily. 30 tablet 0    niacin 50 MG Tab niacin      niacin 50 MG Tab       omega 3-dha-epa-fish oil (FISH OIL) 1,000 mg (120 mg-180 mg) Cap Take by mouth once daily.         Past Surgical History:   Procedure Laterality Date    BILATERAL SALPINGO-OOPHORECTOMY (BSO) Bilateral 12/9/2020    Procedure: SALPINGO-OOPHORECTOMY, BILATERAL;  Surgeon: Yun Recio MD;  Location: [facility] OR;  Service: OB/GYN;  Laterality: Bilateral;  COVID NEGATIVE 12/3    INCISION OF UVULA      knee arhroscopy      NASAL SEPTUM SURGERY      SINUS SURGERY      TONSILLECTOMY      TOTAL ABDOMINAL HYSTERECTOMY N/A 12/9/2020    Procedure: HYSTERECTOMY, TOTAL, ABDOMINAL;  Surgeon: Yun Recio MD;  Location: [facility] OR;  Service: OB/GYN;  Laterality: N/A;    UTERINE FIBROID SURGERY         Social History:  Tobacco Use: Low Risk  (7/16/2024)    Patient History     Smoking Tobacco Use: Never     Smokeless Tobacco Use: Never     Passive Exposure: Not on file       Alcohol Use: Not At Risk (7/12/2024)    AUDIT-C     Frequency of Alcohol Consumption: 2-4 times a month     Average Number of Drinks: 1 or 2     Frequency of Binge Drinking: Never       Objective    Vital Signs Range:  BMI Readings from Last 1 Encounters:   07/16/24 37.93 kg/m²               Significant  Labs:        Component Value Date/Time    WBC 10.12 07/16/2024 1218    HGB 13.4 07/16/2024 1218    HCT 42.1 07/16/2024 1218     07/16/2024 1218     07/16/2024 1218    K 4.3 07/16/2024 1218     07/16/2024 1218    CO2 28 07/16/2024 1218     07/16/2024 1218    BUN 13 07/16/2024 1218    CREATININE 0.8 07/16/2024 1218    CALCIUM 9.9 07/16/2024 1218    ALBUMIN 4.1 07/16/2024 1218    PROT 7.4 07/16/2024 1218    ALKPHOS 109 07/16/2024 1218    BILITOT 0.4 07/16/2024 1218    AST 16 07/16/2024 1218    ALT 15 07/16/2024 1218        Please see Results Review for additional labs.     Diagnostic Studies: All relevant studies, reviewed.      EKG:   Results for orders placed or performed during the hospital encounter of 12/07/20   EKG 12-lead    Collection Time: 12/07/20  4:02 PM    Narrative    Test Reason : D25.1, PREOP    Vent. Rate : 080 BPM     Atrial Rate : 080 BPM     P-R Int : 154 ms          QRS Dur : 094 ms      QT Int : 380 ms       P-R-T Axes : 055 061 034 degrees     QTc Int : 438 ms    Normal sinus rhythm  Normal ECG  No previous ECGs available  Confirmed by Zeke Wang MD ([id number]) on 12/7/2020 5:19:46 PM    Referred By: YAMILKA FONTENOT           Confirmed By:Zeke Wang MD                                                                                                                  07/26/2024  Imelda Kimble is a 48 y.o., female.      Pre-op Assessment    I have reviewed the Patient Summary Reports.     I have reviewed the Nursing Notes. I have reviewed the NPO Status.   I have reviewed the Medications.     Review of Systems  Anesthesia Hx:  No problems with previous Anesthesia   History of prior surgery of interest to airway management or planning:          Denies Family Hx of Anesthesia complications.    Denies Personal Hx of Anesthesia complications.                    Social:  Non-Smoker, Social Alcohol Use       Hematology/Oncology:                      Current/Recent Cancer.             Oncology Comments: Rectal adenocarcinoma     EENT/Dental:  EENT/Dental Normal           Cardiovascular:  Exercise tolerance: good   Hypertension    Denies CAD.    Denies CABG/stent.     Denies CHF.    no hyperlipidemia   ECG has been reviewed.                          Pulmonary:    Denies COPD.  Denies Asthma.     Denies Sleep Apnea.                Renal/:  Renal/ Normal  Denies Chronic Renal Disease.                Hepatic/GI:  Hepatic/GI Normal     Denies GERD.             Musculoskeletal:  Musculoskeletal Normal                OB/GYN/PEDS:  Hx hysterectomy           Neurological:  Neurology Normal   Denies CVA.    Denies Headaches. Denies Seizures.                                Endocrine:  Endocrine Normal Denies Diabetes.         Obesity / BMI > 30  Psych:  Denies Psychiatric History. anxiety                 Physical Exam  General: Well nourished, Cooperative, Alert and Oriented    Airway:  Mallampati: II / II  Mouth Opening: Normal  TM Distance: Normal  Tongue: Normal  Neck ROM: Normal ROM    Dental:  Intact        Anesthesia Plan  Type of Anesthesia, risks & benefits discussed:    Anesthesia Type: Gen Natural Airway, MAC  Intra-op Monitoring Plan: Standard ASA Monitors  Post Op Pain Control Plan: multimodal analgesia and IV/PO Opioids PRN  Induction:  IV  Airway Plan: Direct and Video, Post-Induction  Informed Consent: Informed consent signed with the Patient and all parties understand the risks and agree with anesthesia plan.  All questions answered. Patient consented to blood products? Yes  ASA Score: 3  Day of Surgery Review of History & Physical: H&P Update referred to the surgeon/provider.    Ready For Surgery From Anesthesia Perspective.     .

## 2024-07-26 NOTE — TELEPHONE ENCOUNTER
Spoke to patient.  Confirmed procedure for 7/29/2024 with Dr. Layton.  Informed to arrive at the [facility] on the 2nd floor [address] [facility] for 0630  and surgery time is 0830.  Surgery Instructions provided as follows:  instructed to remain NPO solids 8 hours prior to surgery, clear liquids until 2 hours prior to surgery, to shower with hibiclens/antibacterial soap the night before surgery and morning of surgery before arrival, not to apply any lotions, powders or deodorant, remove all metal and jewelry, to wear comfortable clothes, and leave all valuables at home. Medications reviewed and  instructed to bring medications on DOS. Pt confirms she spoke to PAT nurse.  Confirmed pt  will have transportation home and friend will accompany pt on DOS.   Post operative instructions reviewed. Pt  verbalized understanding to all of the above and instructed to contact us for any questions.

## 2024-07-28 DIAGNOSIS — C20 RECTAL ADENOCARCINOMA: Primary | ICD-10-CM

## 2024-07-28 RX ORDER — CEFAZOLIN SODIUM 2 G/50ML
2 SOLUTION INTRAVENOUS
Status: CANCELLED | OUTPATIENT
Start: 2024-07-28

## 2024-07-29 ENCOUNTER — ANESTHESIA (OUTPATIENT)
Dept: SURGERY | Facility: HOSPITAL | Age: 49
End: 2024-07-29
Payer: COMMERCIAL

## 2024-07-29 ENCOUNTER — HOSPITAL ENCOUNTER (OUTPATIENT)
Facility: HOSPITAL | Age: 49
Discharge: HOME | End: 2024-07-29
Attending: SURGERY | Admitting: SURGERY
Payer: COMMERCIAL

## 2024-07-29 VITALS
TEMPERATURE: 98 F | WEIGHT: 239 LBS | DIASTOLIC BLOOD PRESSURE: 76 MMHG | RESPIRATION RATE: 15 BRPM | HEART RATE: 69 BPM | BODY MASS INDEX: 37.51 KG/M2 | HEIGHT: 67 IN | SYSTOLIC BLOOD PRESSURE: 123 MMHG | OXYGEN SATURATION: 98 %

## 2024-07-29 DIAGNOSIS — C20 RECTAL ADENOCARCINOMA: Primary | ICD-10-CM

## 2024-07-29 PROCEDURE — 37000009 HC ANESTHESIA EA ADD 15 MINS: Performed by: SURGERY

## 2024-07-29 PROCEDURE — 25000003 PHARM REV CODE 250

## 2024-07-29 PROCEDURE — 63600175 PHARM REV CODE 636 W HCPCS

## 2024-07-29 PROCEDURE — 71000015 HC POSTOP RECOV 1ST HR: Performed by: SURGERY

## 2024-07-29 PROCEDURE — 37000008 HC ANESTHESIA 1ST 15 MINUTES: Performed by: SURGERY

## 2024-07-29 PROCEDURE — 36000707: Performed by: SURGERY

## 2024-07-29 PROCEDURE — 63600175 PHARM REV CODE 636 W HCPCS: Mod: JZ,JG | Performed by: SURGERY

## 2024-07-29 PROCEDURE — 77001 FLUOROGUIDE FOR VEIN DEVICE: CPT | Mod: 26,,, | Performed by: SURGERY

## 2024-07-29 PROCEDURE — 71000044 HC DOSC ROUTINE RECOVERY FIRST HOUR: Performed by: SURGERY

## 2024-07-29 PROCEDURE — 36561 INSERT TUNNELED CV CATH: CPT | Mod: RT,,, | Performed by: SURGERY

## 2024-07-29 PROCEDURE — 36000706: Performed by: SURGERY

## 2024-07-29 PROCEDURE — C1788 PORT, INDWELLING, IMP: HCPCS | Performed by: SURGERY

## 2024-07-29 DEVICE — KIT POWERPORT SINGLE 8FR: Type: IMPLANTABLE DEVICE | Site: CHEST | Status: FUNCTIONAL

## 2024-07-29 RX ORDER — LIDOCAINE HYDROCHLORIDE 20 MG/ML
INJECTION, SOLUTION EPIDURAL; INFILTRATION; INTRACAUDAL; PERINEURAL
Status: DISCONTINUED | OUTPATIENT
Start: 2024-07-29 | End: 2024-07-29

## 2024-07-29 RX ORDER — BUPIVACAINE HYDROCHLORIDE 2.5 MG/ML
INJECTION, SOLUTION EPIDURAL; INFILTRATION; INTRACAUDAL
Status: DISCONTINUED | OUTPATIENT
Start: 2024-07-29 | End: 2024-07-29 | Stop reason: HOSPADM

## 2024-07-29 RX ORDER — CEFAZOLIN 2 G/1
INJECTION, POWDER, FOR SOLUTION INTRAMUSCULAR; INTRAVENOUS
Status: DISCONTINUED | OUTPATIENT
Start: 2024-07-29 | End: 2024-07-29

## 2024-07-29 RX ORDER — HYDROMORPHONE HYDROCHLORIDE 1 MG/ML
0.2 INJECTION, SOLUTION INTRAMUSCULAR; INTRAVENOUS; SUBCUTANEOUS EVERY 5 MIN PRN
Status: DISCONTINUED | OUTPATIENT
Start: 2024-07-29 | End: 2024-07-29 | Stop reason: HOSPADM

## 2024-07-29 RX ORDER — DEXMEDETOMIDINE HYDROCHLORIDE 100 UG/ML
INJECTION, SOLUTION INTRAVENOUS
Status: DISCONTINUED | OUTPATIENT
Start: 2024-07-29 | End: 2024-07-29

## 2024-07-29 RX ORDER — OXYCODONE HYDROCHLORIDE 5 MG/1
5 TABLET ORAL EVERY 6 HOURS PRN
Qty: 3 TABLET | Refills: 0 | Status: SHIPPED | OUTPATIENT
Start: 2024-07-29

## 2024-07-29 RX ORDER — MIDAZOLAM HYDROCHLORIDE 5 MG/ML
INJECTION INTRAMUSCULAR; INTRAVENOUS
Status: DISCONTINUED | OUTPATIENT
Start: 2024-07-29 | End: 2024-07-29

## 2024-07-29 RX ORDER — PROCHLORPERAZINE EDISYLATE 5 MG/ML
5 INJECTION INTRAMUSCULAR; INTRAVENOUS EVERY 30 MIN PRN
Status: DISCONTINUED | OUTPATIENT
Start: 2024-07-29 | End: 2024-07-29 | Stop reason: HOSPADM

## 2024-07-29 RX ORDER — PROPOFOL 10 MG/ML
VIAL (ML) INTRAVENOUS
Status: DISCONTINUED | OUTPATIENT
Start: 2024-07-29 | End: 2024-07-29

## 2024-07-29 RX ORDER — GLUCAGON 1 MG
1 KIT INJECTION
Status: DISCONTINUED | OUTPATIENT
Start: 2024-07-29 | End: 2024-07-29 | Stop reason: HOSPADM

## 2024-07-29 RX ORDER — DEXAMETHASONE SODIUM PHOSPHATE 4 MG/ML
INJECTION, SOLUTION INTRA-ARTICULAR; INTRALESIONAL; INTRAMUSCULAR; INTRAVENOUS; SOFT TISSUE
Status: DISCONTINUED | OUTPATIENT
Start: 2024-07-29 | End: 2024-07-29

## 2024-07-29 RX ORDER — ACETAMINOPHEN 500 MG
1000 TABLET ORAL
Status: COMPLETED | OUTPATIENT
Start: 2024-07-29 | End: 2024-07-29

## 2024-07-29 RX ORDER — FENTANYL CITRATE 50 UG/ML
INJECTION, SOLUTION INTRAMUSCULAR; INTRAVENOUS
Status: DISCONTINUED | OUTPATIENT
Start: 2024-07-29 | End: 2024-07-29

## 2024-07-29 RX ORDER — HEPARIN 100 UNIT/ML
SYRINGE INTRAVENOUS
Status: DISCONTINUED | OUTPATIENT
Start: 2024-07-29 | End: 2024-07-29 | Stop reason: HOSPADM

## 2024-07-29 RX ADMIN — LIDOCAINE HYDROCHLORIDE 40 MG: 20 INJECTION, SOLUTION EPIDURAL; INFILTRATION; INTRACAUDAL at 08:07

## 2024-07-29 RX ADMIN — FENTANYL CITRATE 50 MCG: 50 INJECTION INTRAMUSCULAR; INTRAVENOUS at 08:07

## 2024-07-29 RX ADMIN — DEXAMETHASONE SODIUM PHOSPHATE 4 MG: 4 INJECTION INTRA-ARTICULAR; INTRALESIONAL; INTRAMUSCULAR; INTRAVENOUS; SOFT TISSUE at 09:07

## 2024-07-29 RX ADMIN — FENTANYL CITRATE 25 MCG: 50 INJECTION INTRAMUSCULAR; INTRAVENOUS at 09:07

## 2024-07-29 RX ADMIN — MIDAZOLAM 2 MG: 5 INJECTION INTRAMUSCULAR; INTRAVENOUS at 08:07

## 2024-07-29 RX ADMIN — PROPOFOL 50 MCG/KG/MIN: 10 INJECTION, EMULSION INTRAVENOUS at 08:07

## 2024-07-29 RX ADMIN — CEFAZOLIN 2 G: 330 INJECTION, POWDER, FOR SOLUTION INTRAMUSCULAR; INTRAVENOUS at 08:07

## 2024-07-29 RX ADMIN — PROPOFOL 20 MG: 10 INJECTION, EMULSION INTRAVENOUS at 08:07

## 2024-07-29 RX ADMIN — PROPOFOL 20 MG: 10 INJECTION, EMULSION INTRAVENOUS at 09:07

## 2024-07-29 RX ADMIN — ACETAMINOPHEN 1000 MG: 500 TABLET ORAL at 07:07

## 2024-07-29 RX ADMIN — DEXMEDETOMIDINE 8 MCG: 100 INJECTION, SOLUTION, CONCENTRATE INTRAVENOUS at 09:07

## 2024-07-29 NOTE — PROGRESS NOTES
Discharge instructions and prescriptions reviewed. Vital signs stable, taking fluids. Meets criteria for discharge at this time.

## 2024-07-29 NOTE — H&P
Patient ID: Imelda Kimble is a 48 y.o. female.    Chief Complaint: No chief complaint on file.      HPI: Imelda Kimble is a 48 y.o. female w/ a significant PMHx of HTN, anxiety, BMI 38, and newly diagnosed rectal adenocarcinoma. She is here today for port placement with Dr. Layton. She is not on anticoagulation or antiplatelet medication and has never had neck surgery or previous CVC.       Review of Systems   Constitutional:  Negative for chills, fatigue and fever.   Respiratory:  Negative for cough, chest tightness and shortness of breath.    Cardiovascular:  Negative for chest pain and palpitations.   Gastrointestinal:  Positive for blood in stool. Negative for abdominal distention, abdominal pain, anal bleeding, constipation, diarrhea, nausea, rectal pain and vomiting.   Genitourinary:  Negative for dysuria, frequency and hematuria.   Neurological:  Negative for dizziness and light-headedness.       Current Facility-Administered Medications   Medication Dose Route Frequency Provider Last Rate Last Admin    ceFAZolin 2 g in D5W 50 mL IVPB (MB+)  2 g Intravenous On Call Procedure Mimi Granados NP           Review of patient's allergies indicates:  No Known Allergies    Past Medical History:   Diagnosis Date    Anxiety disorder, unspecified     Fibroids     HTN (hypertension)     Sinus problem        Past Surgical History:   Procedure Laterality Date    BILATERAL SALPINGO-OOPHORECTOMY (BSO) Bilateral 12/9/2020    Procedure: SALPINGO-OOPHORECTOMY, BILATERAL;  Surgeon: Yun Recio MD;  Location: [facility] OR;  Service: OB/GYN;  Laterality: Bilateral;  COVID NEGATIVE 12/3    INCISION OF UVULA      knee arhroscopy      NASAL SEPTUM SURGERY      SINUS SURGERY      TONSILLECTOMY      TOTAL ABDOMINAL HYSTERECTOMY N/A 12/9/2020    Procedure: HYSTERECTOMY, TOTAL, ABDOMINAL;  Surgeon: Yun Recio MD;  Location: [facility] OR;  Service: OB/GYN;  Laterality: N/A;    UTERINE FIBROID SURGERY         Social History      Socioeconomic History    Marital status: Single   Occupational History    Occupation:    Tobacco Use    Smoking status: Never    Smokeless tobacco: Never   Substance and Sexual Activity    Alcohol use: Yes     Comment: Socially    Drug use: No     Social Determinants of Health     Financial Resource Strain: Low Risk  (7/12/2024)    Overall Financial Resource Strain (CARDIA)     Difficulty of Paying Living Expenses: Not hard at all   Food Insecurity: No Food Insecurity (7/12/2024)    Hunger Vital Sign     Worried About Running Out of Food in the Last Year: Never true     Ran Out of Food in the Last Year: Never true   Physical Activity: Inactive (7/12/2024)    Exercise Vital Sign     Days of Exercise per Week: 0 days     Minutes of Exercise per Session: 0 min   Stress: No Stress Concern Present (7/12/2024)    [employer] - Occupational Stress Questionnaire     Feeling of Stress : Not at all   Housing Stability: Unknown (7/12/2024)    Housing Stability Vital Sign     Unable to Pay for Housing in the Last Year: No       Vitals:    07/29/24 0754   BP: 137/79   Pulse: 74   Resp: 16   Temp: 97.9 °F (36.6 °C)       Physical Exam  Constitutional:       General: She is not in acute distress.     Appearance: Normal appearance. She is not diaphoretic.   HENT:      Head: Normocephalic and atraumatic.   Eyes:      Pupils: Pupils are equal, round, and reactive to light.   Cardiovascular:      Rate and Rhythm: Normal rate and regular rhythm.   Pulmonary:      Effort: Pulmonary effort is normal. No respiratory distress.   Abdominal:      General: There is no distension.      Palpations: Abdomen is soft.      Tenderness: There is no abdominal tenderness. There is no guarding.   Musculoskeletal:      Cervical back: Normal range of motion.   Skin:     General: Skin is warm and dry.   Neurological:      General: No focal deficit present.      Mental Status: She is alert and oriented to person,  place, and time.   Psychiatric:         Mood and Affect: Mood normal.         Behavior: Behavior normal.         Thought Content: Thought content normal.         Judgment: Judgment normal.         Assessment & Plan:  Imelda Kimble is here today for port placement due to recent diagnosis of rectal adenocarcinoma.   - to OR   - The procedure was described in detail. The risks, benefits and alternatives were discussed. She voiced her understanding and written consent was obtained    Aiden Calvert MD   General Surgery, PGY-4

## 2024-07-29 NOTE — BRIEF OP NOTE
Dano Fried - Surgery ([facility])  Brief Operative Note    Surgery Date: 7/29/2024     Surgeons and Role:     * Russel Layton MD - Primary     * Aiden Calvert MD - Resident - Chief    Assisting Surgeon: None    Pre-op Diagnosis:  Rectal adenocarcinoma [C20]    Post-op Diagnosis:  Post-Op Diagnosis Codes:     * Rectal adenocarcinoma [C20]    Procedure(s) (LRB):  INSERTION, SINGLE LUMEN CATHETER WITH PORT, WITH FLUOROSCOPIC GUIDANCE (Right)    Anesthesia: Local MAC    Operative Findings: RIJ port placed without immediate complication     Estimated Blood Loss: 15 cc          Specimens:   Specimen (24h ago, onward)      None              Discharge Note    OUTCOME: Patient tolerated treatment/procedure well without complication and is now ready for discharge.    DISPOSITION: Home or Self Care    FINAL DIAGNOSIS:  same as preop     FOLLOWUP: With primary care provider    DISCHARGE INSTRUCTIONS:    Discharge Procedure Orders   Diet Adult Regular     Notify your health care provider if you experience any of the following:  temperature >100.4     Notify your health care provider if you experience any of the following:  severe uncontrolled pain     Notify your health care provider if you experience any of the following:  redness, tenderness, or signs of infection (pain, swelling, redness, odor or green/yellow discharge around incision site)     Notify your health care provider if you experience any of the following:  persistent dizziness, light-headedness, or visual disturbances     Notify your health care provider if you experience any of the following:  increased confusion or weakness     Activity as tolerated   Order Comments: Okay to shower the day after surgery. Please do not submerge wounds underwater (no bath, no pool, no lake, etc.) for at least 2 weeks.       
52

## 2024-07-29 NOTE — TRANSFER OF CARE
"Anesthesia Transfer of Care Note    Patient: Imelda Kimble    Procedure(s) Performed: Procedure(s) (LRB):  INSERTION, SINGLE LUMEN CATHETER WITH PORT, WITH FLUOROSCOPIC GUIDANCE (Right)    Patient location: [facility]    Anesthesia Type: MAC    Transport from OR: Transported from OR on 6-10 L/min O2 by face mask with adequate spontaneous ventilation    Post pain: adequate analgesia    Post assessment: no apparent anesthetic complications and tolerated procedure well    Post vital signs: stable    Level of consciousness: alert and awake    Nausea/Vomiting: no nausea/vomiting    Complications: none    Transfer of care protocol was followed      Last vitals: Visit Vitals  /74   Pulse 70   Temp 36.5 °C (97.7 °F) (Temporal)   Resp 20   Ht 5' 7" (1.702 m)   Wt 108.4 kg (239 lb)   LMP 11/18/2020   SpO2 99%   Breastfeeding No   BMI 37.43 kg/m²     "

## 2024-07-31 NOTE — OP NOTE
Dano Fried - Surgery ([facility])  Surgery Department  Operative Note    SUMMARY     Date of Procedure: 7/29/2024     Procedure: Procedure(s) (LRB):  INSERTION, SINGLE LUMEN CATHETER WITH PORT, WITH FLUOROSCOPIC GUIDANCE (Right)     Surgeons and Role:     * Russel Layton MD - Primary     * Aiden Calvert MD - Resident - Chief    Assisting Surgeon: None    Pre-Operative Diagnosis: Rectal adenocarcinoma [C20]    Post-Operative Diagnosis: Post-Op Diagnosis Codes:     * Rectal adenocarcinoma [C20]  Same    Anesthesia: Local MAC     Estimated Blood Loss (EBL):  <10 mL    Procedures:     1.  Right Internal Jugular Tunneled Central Venous Catheter with Subcutaneous Port (PowerPort)  2.  Intraoperative Fluoroscopy for Port Placement    Indication:  Imelda Kimble is a 48 y.o. female in need of port placement for central venous access.  Risks and benefits of port placement including bleeding, infection, hemothorax, pneumothorax, death, port malfunction, need for additional procedures and imponderables were all reviewed prior to the procedure;  she agrees to proceed.    Details: After informed consent, the patient was transported to the operating room.  Following satisfactory anesthesia, the patient was positioned supine, Trendelenburg position, and the arms tucked and padded.  The operative field was prepped and draped in sterile fashion.  Local anesthetic was infused about the port site.  The ultrasound was used to survey the internal jugular veins, which were patent and without abnormality.  The right internal jugular vein was confirmed by easy compression and respiratory variation.  Under ultrasound guidance, an access needle was passed into the internal jugular vein with easy return of dark red, non-pulsatile blood.  The wire was confirmed to be in the vein by ultrasound.  A wire was passed into the superior vena cava under fluoroscopic guidance.   A tunnel was created to the chest wall, where an incision and pocket  were created with a scalpel and electrocautery.  The port was sutured to the pectoral fascia with absorbable suture.  Sheath and dilator assembly were passed over the wire under fluoroscopic guidance, and the wire/dilator removed.  A catheter was passed through the tunnel, into the sheath, measured and cut to length under fluoroscopic guidance.  The sheath was peeled away in its entirety.  The fluoroscopic images were interpreted by me and saved on PACS.  The catheter and port were joined, de-aired, aspirated and flushed with easy return of dark red, non-pulsatile blood.  Heparinized saline was instilled in the catheter and port.  Wounds were closed using absorbable suture, derma-bond and steri-strips.  The patient recovered from anesthesia and was taken to the recovery room in stable condition.  All needle, lap, and sponge counts were reported as correct.  I communicated the intraoperative findings with the family following the procedure.        Implants:   Implant Name Type Inv. Item Serial No.  Lot No. LRB No. Used Action   KIT POWERPORT SINGLE 8FR - [id number]  KIT POWERPORT SINGLE 8FR  [employer] [id number] Right 1 Implanted       Specimens:   Specimen (24h ago, onward)      None                  Condition: Good    Disposition: PACU - hemodynamically stable.    Attestation: I was present for the entire procedure.

## 2024-08-01 NOTE — ANESTHESIA POSTPROCEDURE EVALUATION
Anesthesia Post Evaluation    Patient: Imelda Kimble    Procedure(s) Performed: Procedure(s) (LRB):  INSERTION, SINGLE LUMEN CATHETER WITH PORT, WITH FLUOROSCOPIC GUIDANCE (Right)    Final Anesthesia Type: general      Patient location during evaluation: PACU  Patient participation: Yes- Able to Participate  Level of consciousness: awake  Post-procedure vital signs: reviewed and stable  Pain management: adequate  Airway patency: patent    PONV status at discharge: No PONV  Anesthetic complications: no      Cardiovascular status: blood pressure returned to baseline  Respiratory status: unassisted  Hydration status: euvolemic  Follow-up not needed.              Vitals Value Taken Time   /76 07/29/24 1016   Temp  08/01/24 0729   Pulse 67 07/29/24 1027   Resp 23 07/29/24 1024   SpO2 98 % 07/29/24 1027   Vitals shown include unfiled device data.      No case tracking events are documented in the log.      Pain/Jessica Score: No data recorded

## 2024-08-07 ENCOUNTER — PATIENT MESSAGE (OUTPATIENT)
Dept: HEMATOLOGY/ONCOLOGY | Facility: CLINIC | Age: 49
End: 2024-08-07
Payer: COMMERCIAL

## 2024-08-12 ENCOUNTER — CLINICAL SUPPORT (OUTPATIENT)
Dept: HEMATOLOGY/ONCOLOGY | Facility: CLINIC | Age: 49
End: 2024-08-12
Payer: COMMERCIAL

## 2024-08-12 ENCOUNTER — INFUSION (OUTPATIENT)
Dept: INFUSION THERAPY | Facility: HOSPITAL | Age: 49
End: 2024-08-12
Payer: COMMERCIAL

## 2024-08-12 ENCOUNTER — LAB VISIT (OUTPATIENT)
Dept: LAB | Facility: HOSPITAL | Age: 49
End: 2024-08-12
Payer: COMMERCIAL

## 2024-08-12 VITALS
RESPIRATION RATE: 16 BRPM | DIASTOLIC BLOOD PRESSURE: 82 MMHG | WEIGHT: 237.56 LBS | BODY MASS INDEX: 37.29 KG/M2 | HEIGHT: 67 IN | HEART RATE: 93 BPM | SYSTOLIC BLOOD PRESSURE: 127 MMHG | TEMPERATURE: 98 F

## 2024-08-12 VITALS
HEIGHT: 67 IN | DIASTOLIC BLOOD PRESSURE: 82 MMHG | HEART RATE: 93 BPM | WEIGHT: 237.56 LBS | SYSTOLIC BLOOD PRESSURE: 127 MMHG | BODY MASS INDEX: 37.29 KG/M2

## 2024-08-12 DIAGNOSIS — D50.9 MICROCYTIC ANEMIA: ICD-10-CM

## 2024-08-12 DIAGNOSIS — J30.9 ALLERGIC RHINITIS, UNSPECIFIED SEASONALITY, UNSPECIFIED TRIGGER: ICD-10-CM

## 2024-08-12 DIAGNOSIS — C20 RECTAL ADENOCARCINOMA: ICD-10-CM

## 2024-08-12 DIAGNOSIS — K58.1 IRRITABLE BOWEL SYNDROME WITH CONSTIPATION: ICD-10-CM

## 2024-08-12 DIAGNOSIS — C20 RECTAL CANCER: ICD-10-CM

## 2024-08-12 DIAGNOSIS — C20 RECTAL ADENOCARCINOMA: Primary | ICD-10-CM

## 2024-08-12 DIAGNOSIS — I10 HYPERTENSION, UNSPECIFIED TYPE: ICD-10-CM

## 2024-08-12 DIAGNOSIS — C78.02 MALIGNANT NEOPLASM METASTATIC TO LEFT LUNG: ICD-10-CM

## 2024-08-12 DIAGNOSIS — E78.5 HYPERLIPIDEMIA, UNSPECIFIED HYPERLIPIDEMIA TYPE: ICD-10-CM

## 2024-08-12 DIAGNOSIS — F41.1 GENERALIZED ANXIETY DISORDER: ICD-10-CM

## 2024-08-12 LAB
ALBUMIN SERPL BCP-MCNC: 3.9 G/DL (ref 3.5–5.2)
ALP SERPL-CCNC: 110 U/L (ref 55–135)
ALT SERPL W/O P-5'-P-CCNC: 17 U/L (ref 10–44)
ANION GAP SERPL CALC-SCNC: 10 MMOL/L (ref 8–16)
AST SERPL-CCNC: 19 U/L (ref 10–40)
BILIRUB SERPL-MCNC: 0.3 MG/DL (ref 0.1–1)
BUN SERPL-MCNC: 13 MG/DL (ref 6–20)
CALCIUM SERPL-MCNC: 9.9 MG/DL (ref 8.7–10.5)
CEA SERPL-MCNC: 3.1 NG/ML (ref 0–5)
CHLORIDE SERPL-SCNC: 102 MMOL/L (ref 95–110)
CO2 SERPL-SCNC: 27 MMOL/L (ref 23–29)
CREAT SERPL-MCNC: 0.8 MG/DL (ref 0.5–1.4)
ERYTHROCYTE [DISTWIDTH] IN BLOOD BY AUTOMATED COUNT: 14.5 % (ref 11.5–14.5)
EST. GFR  (NO RACE VARIABLE): >60 ML/MIN/1.73 M^2
GLUCOSE SERPL-MCNC: 105 MG/DL (ref 70–110)
HCT VFR BLD AUTO: 40.7 % (ref 37–48.5)
HGB BLD-MCNC: 12.9 G/DL (ref 12–16)
IMM GRANULOCYTES # BLD AUTO: 0.04 K/UL (ref 0–0.04)
MCH RBC QN AUTO: 26.5 PG (ref 27–31)
MCHC RBC AUTO-ENTMCNC: 31.7 G/DL (ref 32–36)
MCV RBC AUTO: 84 FL (ref 82–98)
NEUTROPHILS # BLD AUTO: 7.3 K/UL (ref 1.8–7.7)
PLATELET # BLD AUTO: 267 K/UL (ref 150–450)
PMV BLD AUTO: 11.6 FL (ref 9.2–12.9)
POTASSIUM SERPL-SCNC: 4 MMOL/L (ref 3.5–5.1)
PROT SERPL-MCNC: 7.1 G/DL (ref 6–8.4)
RBC # BLD AUTO: 4.86 M/UL (ref 4–5.4)
SODIUM SERPL-SCNC: 139 MMOL/L (ref 136–145)
TEMPUS BLOOD ADD-ON: NORMAL
WBC # BLD AUTO: 10.77 K/UL (ref 3.9–12.7)

## 2024-08-12 PROCEDURE — 96413 CHEMO IV INFUSION 1 HR: CPT

## 2024-08-12 PROCEDURE — 82378 CARCINOEMBRYONIC ANTIGEN: CPT | Performed by: INTERNAL MEDICINE

## 2024-08-12 PROCEDURE — 85027 COMPLETE CBC AUTOMATED: CPT | Performed by: INTERNAL MEDICINE

## 2024-08-12 PROCEDURE — 99215 OFFICE O/P EST HI 40 MIN: CPT | Mod: S$GLB,,, | Performed by: PHYSICIAN ASSISTANT

## 2024-08-12 PROCEDURE — 96416 CHEMO PROLONG INFUSE W/PUMP: CPT

## 2024-08-12 PROCEDURE — 63600175 PHARM REV CODE 636 W HCPCS: Mod: JZ,JG | Performed by: INTERNAL MEDICINE

## 2024-08-12 PROCEDURE — 99999 PR PBB SHADOW E&M-EST. PATIENT-LVL IV: CPT | Mod: PBBFAC,,, | Performed by: PHYSICIAN ASSISTANT

## 2024-08-12 PROCEDURE — 96367 TX/PROPH/DG ADDL SEQ IV INF: CPT

## 2024-08-12 PROCEDURE — G2211 COMPLEX E/M VISIT ADD ON: HCPCS | Mod: S$GLB,,, | Performed by: PHYSICIAN ASSISTANT

## 2024-08-12 PROCEDURE — 80053 COMPREHEN METABOLIC PANEL: CPT | Performed by: INTERNAL MEDICINE

## 2024-08-12 PROCEDURE — 25000003 PHARM REV CODE 250: Performed by: INTERNAL MEDICINE

## 2024-08-12 PROCEDURE — 96415 CHEMO IV INFUSION ADDL HR: CPT

## 2024-08-12 PROCEDURE — 96417 CHEMO IV INFUS EACH ADDL SEQ: CPT

## 2024-08-12 PROCEDURE — 36415 COLL VENOUS BLD VENIPUNCTURE: CPT | Performed by: INTERNAL MEDICINE

## 2024-08-12 RX ORDER — DIPHENHYDRAMINE HYDROCHLORIDE 50 MG/ML
50 INJECTION INTRAMUSCULAR; INTRAVENOUS ONCE AS NEEDED
Status: DISCONTINUED | OUTPATIENT
Start: 2024-08-12 | End: 2024-08-12 | Stop reason: HOSPADM

## 2024-08-12 RX ORDER — SODIUM CHLORIDE 0.9 % (FLUSH) 0.9 %
10 SYRINGE (ML) INJECTION
Status: DISCONTINUED | OUTPATIENT
Start: 2024-08-12 | End: 2024-08-12 | Stop reason: HOSPADM

## 2024-08-12 RX ORDER — HEPARIN 100 UNIT/ML
500 SYRINGE INTRAVENOUS
Status: DISCONTINUED | OUTPATIENT
Start: 2024-08-12 | End: 2024-08-12 | Stop reason: HOSPADM

## 2024-08-12 RX ORDER — PROCHLORPERAZINE EDISYLATE 5 MG/ML
10 INJECTION INTRAMUSCULAR; INTRAVENOUS ONCE AS NEEDED
Status: DISCONTINUED | OUTPATIENT
Start: 2024-08-12 | End: 2024-08-12 | Stop reason: HOSPADM

## 2024-08-12 RX ORDER — PROMETHAZINE HYDROCHLORIDE 25 MG/1
25 TABLET ORAL EVERY 6 HOURS PRN
Qty: 60 TABLET | Refills: 7 | Status: SHIPPED | OUTPATIENT
Start: 2024-08-12 | End: 2024-09-11

## 2024-08-12 RX ORDER — ONDANSETRON 8 MG/1
8 TABLET, ORALLY DISINTEGRATING ORAL EVERY 6 HOURS PRN
Qty: 60 TABLET | Refills: 4 | Status: SHIPPED | OUTPATIENT
Start: 2024-08-12 | End: 2025-08-12

## 2024-08-12 RX ORDER — LIDOCAINE AND PRILOCAINE 25; 25 MG/G; MG/G
CREAM TOPICAL
Qty: 30 G | Refills: 3 | Status: SHIPPED | OUTPATIENT
Start: 2024-08-12

## 2024-08-12 RX ORDER — PROCHLORPERAZINE EDISYLATE 5 MG/ML
10 INJECTION INTRAMUSCULAR; INTRAVENOUS ONCE AS NEEDED
Status: CANCELLED | OUTPATIENT
Start: 2024-08-13

## 2024-08-12 RX ORDER — SODIUM CHLORIDE 0.9 % (FLUSH) 0.9 %
10 SYRINGE (ML) INJECTION
Status: CANCELLED | OUTPATIENT
Start: 2024-08-13

## 2024-08-12 RX ORDER — HEPARIN 100 UNIT/ML
500 SYRINGE INTRAVENOUS
Status: CANCELLED | OUTPATIENT
Start: 2024-08-13

## 2024-08-12 RX ORDER — ONDANSETRON 8 MG/1
8 TABLET, ORALLY DISINTEGRATING ORAL EVERY 6 HOURS PRN
Qty: 60 TABLET | Refills: 4 | Status: SHIPPED | OUTPATIENT
Start: 2024-08-12 | End: 2024-08-12 | Stop reason: SDUPTHER

## 2024-08-12 RX ORDER — LIDOCAINE AND PRILOCAINE 25; 25 MG/G; MG/G
CREAM TOPICAL
Qty: 30 G | Refills: 3 | Status: SHIPPED | OUTPATIENT
Start: 2024-08-12 | End: 2024-08-12 | Stop reason: SDUPTHER

## 2024-08-12 RX ORDER — EPINEPHRINE 0.3 MG/.3ML
0.3 INJECTION SUBCUTANEOUS ONCE AS NEEDED
Status: DISCONTINUED | OUTPATIENT
Start: 2024-08-12 | End: 2024-08-12 | Stop reason: HOSPADM

## 2024-08-12 RX ORDER — PROMETHAZINE HYDROCHLORIDE 25 MG/1
25 TABLET ORAL EVERY 6 HOURS PRN
Qty: 60 TABLET | Refills: 7 | Status: SHIPPED | OUTPATIENT
Start: 2024-08-12 | End: 2024-08-12 | Stop reason: SDUPTHER

## 2024-08-12 RX ADMIN — DEXAMETHASONE SODIUM PHOSPHATE 0.25 MG: 4 INJECTION, SOLUTION INTRA-ARTICULAR; INTRALESIONAL; INTRAMUSCULAR; INTRAVENOUS; SOFT TISSUE at 12:08

## 2024-08-12 RX ADMIN — FLUOROURACIL 5470 MG: 50 INJECTION, SOLUTION INTRAVENOUS at 02:08

## 2024-08-12 RX ADMIN — SODIUM CHLORIDE: 9 INJECTION, SOLUTION INTRAVENOUS at 10:08

## 2024-08-12 RX ADMIN — OXALIPLATIN 194 MG: 5 INJECTION, SOLUTION INTRAVENOUS at 12:08

## 2024-08-12 RX ADMIN — DEXTROSE MONOHYDRATE: 50 INJECTION, SOLUTION INTRAVENOUS at 10:08

## 2024-08-12 RX ADMIN — BEVACIZUMAB-AWWB 500 MG: 100 INJECTION, SOLUTION INTRAVENOUS at 11:08

## 2024-08-12 NOTE — PLAN OF CARE
1500 pt tolerated tx without issue. Port connected to CADD pump 240 ml, RUN on pump and counting down noted. Pt instructed to Rreturn to clinic for pump d/c on 8/14/24 at 1pm. Pt verbalized understanding. D/c from  clinic.

## 2024-08-12 NOTE — PROGRESS NOTES
MEDICAL ONCOLOGY - NEW PATIENT VISIT    Reason for visit: rectal cancer     Best Contact Phone Number(s): [phone] (home)      Cancer/Stage/TNM:    Cancer Staging   Rectal adenocarcinoma  Staging form: Colon and Rectum, AJCC 8th Edition  - Clinical stage from 6/21/2024: Stage NAVID (cT2, cN2b, cM1a) - Signed by Dion Rudolph MD on 7/24/2024  - Pathologic: No stage assigned - Unsigned       Oncology History   Rectal adenocarcinoma   6/21/2024 Procedure    Colonoscopy - mild diverticular disease, mass in distal rectum (Complete)     6/21/2024 Cancer Staged    Staging form: Colon and Rectum, AJCC 8th Edition  - Clinical stage from 6/21/2024: Stage NAVID (cT2, cN2b, cM1a)     6/24/2024 Initial Diagnosis    Rectal adenocarcinoma  ERROL  - Colonoscopy 6/21/24: infiltrative, ulcerated, sub-mucosal, fungating and villous bleeding mass found in distal rectum, 6-7cm from the anus proximally.   - Distal rectal mass biopsy 6/21/24: well differentiated adenocarcinoma, negative for LVI and perineural invasion. IHC MSI-H low probability with intact nuclear expressions of MLH1, MSH2, MSH6, and PMS2.        7/15/2024 Imaging Significant Findings    MRI Rectal cancer  Low rectal tumor invading the internal sphincter in close approximation to the mesorectal fascia without definite involvement.  Multiple suspicious mesorectal and superior rectal nodes.     *MR STAGE     T: T1/T2 (tumor confined to rectal wall)     N: N+ (short axis ? 9 mm)     Blayne statement: Multiple suspicious mesorectal and superior rectal nodes.     *MRF: threatened (tumor margin within 1-2 mm of MRF) noting this is a low rectal tumor.  No definite evidence of invasion.     Sphincter Involvement: Yes involvement the internal sphincter     Suspicious Extramesorectal Lymph Nodes: No     EMVI: No     7/16/2024 Tumor Markers    Patient's tumor was tested for the following markers: CEA                                              Results of the tumor marker test  revealed 2.9     7/17/2024 Imaging Significant Findings    CT CAP  In this patient with a known history of rectal malignancy, there are few borderline prominent lymph nodes adjacent to the rectum measuring up to 8 mm.  There are solid nodules in both lungs, the largest in the lingula measuring up to 1 cm.  Given the patient's primary malignancy, pulmonary metastatic disease cannot be excluded.  Follow-up based upon oncologic guidance.     Prominent soft tissue density in the anterior superior mediastinum which could possibly represent residual thymic tissue with a focal solid structure measuring up to 1.0 x 1.7 cm, possibly an enlarged lymph node versus other soft tissue mass.     Indeterminate focal regions of sclerosis within the T10 vertebra and right sacrum.  Osseous metastatic disease cannot be excluded.     Cholelithiasis without CT evidence for cholecystitis.     Large hiatal hernia.  Diverticulosis coli without diverticulitis.     Large left UPJ stone measuring up to 1.2 cm without hydronephrosis or hydroureter.     7/19/2024 Imaging Significant Findings    PET CT  Hypermetabolic soft tissue thickening along the rectal wall compatible with known malignancy.  There are a few prominent perirectal mesorectal lymph nodes without significant hypermetabolic activity, please note some may be too small for characterization by PET.     0.9 cm lingular pulmonary nodule with mild hypermetabolic activity and prominent hypermetabolic mediastinal lymph node, concerning for metastatic foci.     8/7/2024 -  Chemotherapy    Treatment Summary   Plan Name: OP GI mFOLFOX6 (oxaliplatin leucovorin fluorouracil) with bevacizumab Q2W  Treatment Goal: Palliative  Status: Active  Start Date: 8/7/2024 (Planned)  End Date: 6/27/2025 (Planned)  Provider: Dion Rudolph MD  Chemotherapy: oxaliplatin (ELOXATIN) 85 mg/m2 = 194 mg in D5W 538.8 mL chemo infusion, 85 mg/m2 = 194 mg, Intravenous, Clinic/HOD 1 time, 0 of 24  cycles  fluorouracil (Adrucil) 2,400 mg/m2 = 5,470 mg in 0.9% NaCl 240 mL chemo infusion, 2,400 mg/m2 = 5,470 mg, Intravenous, Clinic/HOD 1 time, 0 of 24 cycles  bevacizumab-awwb (MVASI) 5 mg/kg = 550 mg in 0.9% NaCl 100 mL infusion, 5 mg/kg = 550 mg, Intravenous, Clinic/HOD 1 time, 0 of 24 cycles        Oncological History copied from medical chart:    48 y.o. female with HTN, HLD, ANANT, allergic rhinitis who presents for further evaluation of newly diagnosed rectal cancer.     She was in her usual health and went to PCP for annual screening earlier this year. PCP recommended screening colonoscopy as she is >45. 2 weeks prior to her screening colonoscopy, she noticed hematochezia and rectal pain. Her only symptom prior to this was constipation, which is lifelong. She presents today to start FOLFOX plus avastin per recommendations from TB.     Interval History:   MS Kimble presents back to the clinic to start chemotherapy. She is doing fairly well. She is eating but appetite fluctuates. Has some nausea and fatigue that comes and goes. No vomiting. She finds that she is making herself eat more than having appetite. No report of pain at this time. She is anxious about starting treatment.     Family History: 4 uncles and and aunt with cancer, including melanoma, esophageal, throat, unknown, and RCC.   Social History: . Lifelong nonsmoker. Social alcohol use. Denies illicit drug use.     Patient presents alone.  ECOG PS is 0.  History has been obtained by chart review and discussion with the patient.    ROS:   Review of Systems   Constitutional:  Positive for malaise/fatigue and weight loss. Negative for fever.   HENT:  Negative for hearing loss.    Eyes:  Negative for blurred vision.   Respiratory:  Negative for cough and hemoptysis.    Cardiovascular:  Negative for chest pain.   Gastrointestinal:  Positive for blood in stool and constipation. Negative for abdominal pain, nausea and vomiting.        Rectal bleed,  rectal pain / discomfort    Genitourinary:  Negative for dysuria, frequency and urgency.   Musculoskeletal:  Negative for myalgias.   Skin:  Negative for rash.   Neurological:  Negative for dizziness, tingling, weakness and headaches.   Endo/Heme/Allergies:  Does not bruise/bleed easily.   Psychiatric/Behavioral:  Negative for depression.        Past Medical History:   Past Medical History:   Diagnosis Date    Anxiety disorder, unspecified     Fibroids     HTN (hypertension)     Sinus problem         Past Surgical History:   Past Surgical History:   Procedure Laterality Date    BILATERAL SALPINGO-OOPHORECTOMY (BSO) Bilateral 12/9/2020    Procedure: SALPINGO-OOPHORECTOMY, BILATERAL;  Surgeon: Yun Recio MD;  Location: [facility] OR;  Service: OB/GYN;  Laterality: Bilateral;  COVID NEGATIVE 12/3    INCISION OF UVULA      INSERTION OF TUNNELED CENTRAL VENOUS CATHETER (CVC) WITH SUBCUTANEOUS PORT Right 7/29/2024    Procedure: INSERTION, SINGLE LUMEN CATHETER WITH PORT, WITH FLUOROSCOPIC GUIDANCE;  Surgeon: Russel Layton MD;  Location: [address];  Service: General;  Laterality: Right;    knee arhroscopy      NASAL SEPTUM SURGERY      SINUS SURGERY      TONSILLECTOMY      TOTAL ABDOMINAL HYSTERECTOMY N/A 12/9/2020    Procedure: HYSTERECTOMY, TOTAL, ABDOMINAL;  Surgeon: Yun Recio MD;  Location: [facility] OR;  Service: OB/GYN;  Laterality: N/A;    UTERINE FIBROID SURGERY          Family History:   Family History   Problem Relation Name Age of Onset    Asthma Mother      Hypertension Mother      COPD Father      Diabetes Father      Hypertension Father      Cancer Maternal Uncle      Colon cancer Neg Hx          Social History:   Social History     Tobacco Use    Smoking status: Never    Smokeless tobacco: Never   Substance Use Topics    Alcohol use: Yes     Comment: Socially        I have reviewed and updated the patient's past medical, surgical, family and social histories.    Allergies:   Review of  "patient's allergies indicates:  No Known Allergies     Medications:   Current Outpatient Medications   Medication Sig Dispense Refill    cetirizine (ZYRTEC) 10 MG tablet Take 10 mg by mouth daily as needed.       cholecalciferol, vitamin D3, 5,000 unit Tab Take 5,000 Units by mouth once daily.      citalopram (CELEXA) 20 MG tablet Take 20 mg by mouth every morning.      EVAMIST 1.53 mg/spray (1.7%) transdermal spray SPRAY 2 SPRAYS TO FOREARM DAILY      fluticasone (FLONASE) 50 mcg/actuation nasal spray 1 spray (50 mcg total) by Each Nare route once daily. 16 g 0    hydroCHLOROthiazide (HYDRODIURIL) 25 MG tablet Take 25 mg by mouth every morning.      niacin 50 MG Tab niacin      niacin 50 MG Tab       omega 3-dha-epa-fish oil (FISH OIL) 1,000 mg (120 mg-180 mg) Cap Take by mouth once daily.      rosuvastatin (CRESTOR) 10 MG tablet Take by mouth every evening.      LIDOcaine-prilocaine (EMLA) cream Apply topically as needed. 30 g 3    ondansetron (ZOFRAN-ODT) 8 MG TbDL Take 1 tablet (8 mg total) by mouth every 6 (six) hours as needed (chemotherapy induced nausea/vomiting). 60 tablet 4    oxyCODONE (ROXICODONE) 5 MG immediate release tablet Take 1 tablet (5 mg total) by mouth every 6 (six) hours as needed for Pain. 3 tablet 0    promethazine (PHENERGAN) 25 MG tablet Take 1 tablet (25 mg total) by mouth every 6 (six) hours as needed for Nausea (chemotherapy induced nausea/vomiting). 60 tablet 7     No current facility-administered medications for this visit.        Physical Exam:   /82 (BP Location: Right arm, Patient Position: Sitting, BP Method: Large (Automatic))   Pulse 93   Ht 5' 7" (1.702 m)   Wt 107.7 kg (237 lb 8.7 oz)   LMP 11/18/2020   BMI 37.20 kg/m²                Physical Exam  Vitals and nursing note reviewed.   Constitutional:       General: She is not in acute distress.     Appearance: Normal appearance. She is well-developed and normal weight. She is not ill-appearing.   HENT:      Head: " Normocephalic and atraumatic.      Right Ear: External ear normal.      Left Ear: External ear normal.      Mouth/Throat:      Mouth: Mucous membranes are moist.      Pharynx: Oropharynx is clear.   Eyes:      General: No scleral icterus.     Extraocular Movements: Extraocular movements intact.      Conjunctiva/sclera: Conjunctivae normal.      Pupils: Pupils are equal, round, and reactive to light.   Cardiovascular:      Rate and Rhythm: Normal rate and regular rhythm.      Heart sounds: No murmur heard.     No friction rub. No gallop.   Pulmonary:      Effort: Pulmonary effort is normal. No respiratory distress.      Breath sounds: Normal breath sounds. No stridor. No wheezing, rhonchi or rales.   Chest:      Chest wall: No tenderness.   Abdominal:      General: Abdomen is flat. Bowel sounds are normal. There is no distension.      Palpations: Abdomen is soft. There is no mass.      Tenderness: There is no abdominal tenderness. There is no guarding or rebound.   Musculoskeletal:         General: No swelling, tenderness or deformity. Normal range of motion.      Cervical back: Normal range of motion and neck supple.      Comments: BLE swelling but no edema    Skin:     General: Skin is warm and dry.      Capillary Refill: Capillary refill takes less than 2 seconds.      Findings: No erythema or rash.   Neurological:      Mental Status: She is alert and oriented to person, place, and time.      Gait: Gait is intact.   Psychiatric:         Behavior: Behavior normal.         Thought Content: Thought content normal.         Judgment: Judgment normal.           Labs:   Recent Results (from the past 48 hour(s))   Urinalysis    Collection Time: 08/12/24  7:06 AM   Result Value Ref Range    Specimen UA Urine, Unspecified     Color, UA Yellow Yellow, Straw, Norma    Appearance, UA Hazy (A) Clear    pH, UA 8.0 5.0 - 8.0    Specific Gravity, UA 1.020 1.005 - 1.030    Protein, UA Negative Negative    Glucose, UA Negative  Negative    Ketones, UA Negative Negative    Bilirubin (UA) Negative Negative    Occult Blood UA Trace (A) Negative    Nitrite, UA Positive (A) Negative    Leukocytes, UA 1+ (A) Negative   Urinalysis Microscopic    Collection Time: 08/12/24  7:06 AM   Result Value Ref Range    RBC, UA 9 (H) 0 - 4 /hpf    WBC, UA 24 (H) 0 - 5 /hpf    Bacteria Many (A) None-Occ /hpf    Squam Epithel, UA 19 /hpf    Microscopic Comment SEE COMMENT    CBC Oncology    Collection Time: 08/12/24  8:16 AM   Result Value Ref Range    WBC 10.77 3.90 - 12.70 K/uL    RBC 4.86 4.00 - 5.40 M/uL    Hemoglobin 12.9 12.0 - 16.0 g/dL    Hematocrit 40.7 37.0 - 48.5 %    MCV 84 82 - 98 fL    MCH 26.5 (L) 27.0 - 31.0 pg    MCHC 31.7 (L) 32.0 - 36.0 g/dL    RDW 14.5 11.5 - 14.5 %    Platelets 267 150 - 450 K/uL    MPV 11.6 9.2 - 12.9 fL    Gran # (ANC) 7.3 1.8 - 7.7 K/uL    Immature Grans (Abs) 0.04 0.00 - 0.04 K/uL   Comprehensive Metabolic Panel    Collection Time: 08/12/24  8:16 AM   Result Value Ref Range    Sodium 139 136 - 145 mmol/L    Potassium 4.0 3.5 - 5.1 mmol/L    Chloride 102 95 - 110 mmol/L    CO2 27 23 - 29 mmol/L    Glucose 105 70 - 110 mg/dL    BUN 13 6 - 20 mg/dL    Creatinine 0.8 0.5 - 1.4 mg/dL    Calcium 9.9 8.7 - 10.5 mg/dL    Total Protein 7.1 6.0 - 8.4 g/dL    Albumin 3.9 3.5 - 5.2 g/dL    Total Bilirubin 0.3 0.1 - 1.0 mg/dL    Alkaline Phosphatase 110 55 - 135 U/L    AST 19 10 - 40 U/L    ALT 17 10 - 44 U/L    eGFR >60.0 >60 mL/min/1.73 m^2    Anion Gap 10 8 - 16 mmol/L   CEA    Collection Time: 08/12/24  8:16 AM   Result Value Ref Range    CEA 3.1 0.0 - 5.0 ng/mL   Tumor NGS Blood Add-on    Collection Time: 08/12/24  8:16 AM    Specimen: Blood   Result Value Ref Range    TEMPUS BLOOD ADD-ON Blood collected,Sent to [facility]      Imaging:      PET/CT: 7/19/2024:    Impression:     Hypermetabolic soft tissue thickening along the rectal wall compatible with known malignancy.  There are a few prominent perirectal mesorectal lymph nodes  without significant hypermetabolic activity, please note some may be too small for characterization by PET.     0.9 cm lingular pulmonary nodule with mild hypermetabolic activity and prominent hypermetabolic mediastinal lymph node, concerning for metastatic foci.    CT CAP: 7/17/20204:    Impression:     In this patient with a known history of rectal malignancy, there are few borderline prominent lymph nodes adjacent to the rectum measuring up to 8 mm.  There are solid nodules in both lungs, the largest in the lingula measuring up to 1 cm.  Given the patient's primary malignancy, pulmonary metastatic disease cannot be excluded.  Follow-up based upon oncologic guidance.     Prominent soft tissue density in the anterior superior mediastinum which could possibly represent residual thymic tissue with a focal solid structure measuring up to 1.0 x 1.7 cm, possibly an enlarged lymph node versus other soft tissue mass.     Indeterminate focal regions of sclerosis within the T10 vertebra and right sacrum.  Osseous metastatic disease cannot be excluded.     Cholelithiasis without CT evidence for cholecystitis.     Large hiatal hernia.  Diverticulosis coli without diverticulitis.     Large left UPJ stone measuring up to 1.2 cm without hydronephrosis or hydroureter.        Electronically signed by:Flor Mcgee MD  Date:                                            07/17/2024  Time:                                           10:00    MRI Rectal Cancer Without Contrast    Result Date: 7/15/2024  EXAMINATION: MRI RECTAL CANCER WITHOUT CONTRAST CLINICAL HISTORY: Malignant neoplasm of rectum TECHNIQUE: Multiplanar multi sequence images of the pelvis were obtained per rectal tumor protocol. COMPARISON: None FINDINGS: COLONOSCOPY FINDINGS: N/a MRI FINDINGS: Tumor Location: Lower (0-5 cm) Tumor location (distance from anal verge): Low (0-5.0 cm) cm Distance from top of anal canal (level of puborectalis at anorectal junction) to the  inferior edge of the tumor: 0 cm Relationship to anterior peritoneal reflection: Below Tumor Characteristics: Morphology: Semiannular Circumferential extent/location: 03 - 9 o'clock Tumor Length: 5.6 cm Mucinous: No *MR-T CATEGORY: T1/T2 (tumor confined to rectal wall) LOW RECTAL TUMORS: The lower extent of the tumor is at or below the upper border of the puborectalis sling. The most penetrating component of the tumor invades: internal sphincter only. *MESORECTAL FASCIA (MRF): The distance of the most penetrating component of tumor to the mesorectal fascia is 1-2 mm - no invasion noting this is a low rectal tumor. *LYMPH NODES: Mesorectal/superior rectal lymph nodes): Multiple suspicious mesorectal nodes as follows: -Few superior rectal nodes measuring 0.4 cm short axis with heterogeneous signal intensity and rounded morphology (sequence 2 image 16 and 17) -left mesorectal node measuring 0.7 cm in short axis with heterogeneous signal intensity in rounded morphology (sequence 5 image 34) -2 left mesorectal node measuring 0.5 cm in short axis with heterogeneous signal intensity (sequence 5 image 32, 33). -left mesorectal node measuring 0.5 cm in short axis with irregular borders and heterogeneous signal intensity (sequence 5 image 33). -3 right mesorectal nodes measuring 0.4 cm in short axis with heterogeneous signal intensity and rounded morphology (sequence 5 image 31, 33, 26,). *MR-N CATEGORY: N+ (short axis 5-9 mm AND at least 2 morphologic criteria) Extra-mesorectal nodes (Int. Iliac/Obturator >= 7 mm): No nodes measuring above 7 mm Non-locoregional nodes (Ext. Iliac/Inguinal > 1cm): None *EXTRAMURAL VENOUS INVASION (EMVI): Negative. No tumor extension in the vicinity of any vascular structure. Include the distance and clock face position of the EMVI in relation to the MRF Other: Sigmoid diverticulosis.  Postop changes of hysterectomy.     Low rectal tumor invading the internal sphincter in close approximation to  the mesorectal fascia without definite involvement.  Multiple suspicious mesorectal and superior rectal nodes. *MR STAGE T: T1/T2 (tumor confined to rectal wall) N: N+ (short axis ? 9 mm) Blayne statement: Multiple suspicious mesorectal and superior rectal nodes. *MRF: threatened (tumor margin within 1-2 mm of MRF) noting this is a low rectal tumor.  No definite evidence of invasion. Sphincter Involvement: Yes involvement the internal sphincter Suspicious Extramesorectal Lymph Nodes: No EMVI: No Electronically signed by resident: Gloria Knapp Date:    07/15/2024 Time:    11:14 Electronically signed by: Homer Yanez MD Date:    07/15/2024 Time:    12:48      Path:   Distal rectal mass biopsy 6/21/24: well differentiated adenocarcinoma, negative for LVI and perineural invasion. IHC MSI-H low probability with intact nuclear expressions of MLH1, MSH2, MSH6, and PMS2.         Assessment:       1. Rectal adenocarcinoma    2. Malignant neoplasm metastatic to left lung    3. Microcytic anemia    4. Generalized anxiety disorder    5. Hypertension, unspecified type    6. Hyperlipidemia, unspecified hyperlipidemia type    7. Allergic rhinitis, unspecified seasonality, unspecified trigger    8. Irritable bowel syndrome with constipation            Plan:             # Rectal adenocarcinoma  Imelda Kimble is a 48 y.o. woman with at least Stage IIIB (cT2, cN2b, cM0) distal G1 adenocarcinoma pMMR, 5.6 cm long, below the anterior peritoneal reflection, MRF threatened, with involvement the internal sphincter, EMVI-, with multiple (8+) small suspicious mesorectal and superior rectal nodes.     She was in her usual health and went to PCP for annual screening earlier this year. PCP recommended screening colonoscopy as she is >44. 2 weeks prior to her screening colonoscopy, she noticed hematochezia and rectal pain. Her only symptom prior to this was constipation, which is lifelong. She was seen by Dr. Menendez with CRS and   John in Rad Onc for further treatment planning. After discussion, the following was recommended:   Long-course DAVE > restaging. Will f/u with him 1 month after DAVE with repeat MRI and CT or according to trial protocol. She was also presented to the TB as well, suggesting to, induction chemotherapy, long course chemoradiation and evaluation for SBRT vs lobectomy after re-staging.     Per Dr Lopez as well, long course radiation therapy with Xeloda and 25-30 daily fractions M-F, using IMRT to decrease small bowel and bladder dose. Patient also appears to be candidate for the Jan rectal cancer trial (Olin [id number]) with DAVE testing FOLFOX with or without Irinotecan  She presents today to start FOLFOX plus avastin per recommendations from TB. Had port placed on 7/29/2024    - Doing well.   - Lab work reviewed and adequate to start cycle 1 today  - Proceed with cycle 1 of FOLFOX  plus avastin    Patient was consented for chemotherapy today 8/12/2024 .   An extensive discussion was had which included a thorough discussion of the risk and benefits of treatment and alternatives.  Risks, including but not limited to, possible hair loss, bone marrow damage (anemia, thrombocytopenia, immune suppression, neutropenia), damage to body organs (brain, heart, liver, kidney, lungs, nervous system, skin, and others), allergic reactions, sterility, nausea/vomiting, constipation/diarrhea, sores in the mouth, secondary cancers, local damage at possible injection sites, and rarely death were all discussed.  The patient agrees with the plan, and all questions have been answered to their satisfaction.  Consent was signed the patient, provider, and a third party witness.      RTC in 2 weeks. Pt would like to consider having future chemotherapy treatments at [facility]    # Microcytic anemia   Suspect MICHELLE.   - Reviewed iron panel and ferritin   - will set up for IV iron    Other chronic disease managed by PCP   # Generalized anxiety  disorder - continue citalopram   # HTN - HCTZ   # HLD - statin  # allergic rhinitis - zyrtec and flonase, stable   # IBS-Constipation     Follow up: 2 weeks for cycle 2    The above information has been reviewed with the patient and all questions have been answered to their apparent satisfaction.  She understands that she can call the clinic with any questions.        VIKASH Guzman, PA-C [facility]  Dept of Hematology/Oncology  JUNIOR to GI Oncology team           Route Chart for Scheduling    Med Onc Chart Routing      Follow up with physician 4 weeks and 6 weeks. FOLFOX plus avastin   Follow up with LEIDA 2 weeks. FOLFOX plus avastin   Infusion scheduling note   Would like to have chemotherapy at [facility]   Injection scheduling note    Labs CBC, CMP and CEA   Scheduling:  Preferred lab:  Lab interval: every 2 weeks     Imaging    Pharmacy appointment    Other referrals                  Treatment Plan Information   OP GI mFOLFOX6 (oxaliplatin leucovorin fluorouracil) with bevacizumab Q2W   Dion Rudolph MD   Upcoming Treatment Dates - OP GI mFOLFOX6 (oxaliplatin leucovorin fluorouracil) with bevacizumab Q2W    8/7/2024       Chemotherapy       bevacizumab-awwb (MVASI) 5 mg/kg = 550 mg in 0.9% NaCl 100 mL infusion       oxaliplatin (ELOXATIN) 85 mg/m2 = 194 mg in D5W 538.8 mL chemo infusion       fluorouracil (Adrucil) 2,400 mg/m2 = 5,470 mg in 0.9% NaCl 240 mL chemo infusion       Antiemetics       palonosetron 0.25mg/dexAMETHasone 12mg in NS IVPB 0.25 mg 50 mL  8/21/2024       Chemotherapy       bevacizumab-awwb (MVASI) 5 mg/kg = 550 mg in 0.9% NaCl 100 mL infusion       oxaliplatin (ELOXATIN) 85 mg/m2 = 194 mg in D5W 538.8 mL chemo infusion       fluorouracil (Adrucil) 2,400 mg/m2 = 5,470 mg in 0.9% NaCl 240 mL chemo infusion       Antiemetics       palonosetron 0.25mg/dexAMETHasone 12mg in NS IVPB 0.25 mg 50 mL  9/4/2024       Chemotherapy       bevacizumab-awwb (MVASI) 5 mg/kg =  550 mg in 0.9% NaCl 100 mL infusion       oxaliplatin (ELOXATIN) 85 mg/m2 = 194 mg in D5W 538.8 mL chemo infusion       fluorouracil (Adrucil) 2,400 mg/m2 = 5,470 mg in 0.9% NaCl 240 mL chemo infusion       Antiemetics       palonosetron 0.25mg/dexAMETHasone 12mg in NS IVPB 0.25 mg 50 mL  9/18/2024       Chemotherapy       bevacizumab-awwb (MVASI) 5 mg/kg = 550 mg in 0.9% NaCl 100 mL infusion       oxaliplatin (ELOXATIN) 85 mg/m2 = 194 mg in D5W 538.8 mL chemo infusion       fluorouracil (Adrucil) 2,400 mg/m2 = 5,470 mg in 0.9% NaCl 240 mL chemo infusion       Antiemetics       palonosetron 0.25mg/dexAMETHasone 12mg in NS IVPB 0.25 mg 50 mL

## 2024-08-14 ENCOUNTER — INFUSION (OUTPATIENT)
Dept: INFUSION THERAPY | Facility: HOSPITAL | Age: 49
End: 2024-08-14
Payer: COMMERCIAL

## 2024-08-14 VITALS
SYSTOLIC BLOOD PRESSURE: 137 MMHG | TEMPERATURE: 98 F | RESPIRATION RATE: 18 BRPM | OXYGEN SATURATION: 97 % | DIASTOLIC BLOOD PRESSURE: 86 MMHG | HEART RATE: 76 BPM

## 2024-08-14 DIAGNOSIS — C20 RECTAL ADENOCARCINOMA: Primary | ICD-10-CM

## 2024-08-14 PROCEDURE — A4216 STERILE WATER/SALINE, 10 ML: HCPCS | Performed by: INTERNAL MEDICINE

## 2024-08-14 PROCEDURE — 63600175 PHARM REV CODE 636 W HCPCS: Performed by: INTERNAL MEDICINE

## 2024-08-14 PROCEDURE — 25000003 PHARM REV CODE 250: Performed by: INTERNAL MEDICINE

## 2024-08-14 RX ORDER — PROCHLORPERAZINE EDISYLATE 5 MG/ML
10 INJECTION INTRAMUSCULAR; INTRAVENOUS ONCE AS NEEDED
Status: DISCONTINUED | OUTPATIENT
Start: 2024-08-14 | End: 2024-08-14 | Stop reason: HOSPADM

## 2024-08-14 RX ORDER — SODIUM CHLORIDE 0.9 % (FLUSH) 0.9 %
10 SYRINGE (ML) INJECTION
Status: DISCONTINUED | OUTPATIENT
Start: 2024-08-14 | End: 2024-08-14 | Stop reason: HOSPADM

## 2024-08-14 RX ORDER — HEPARIN 100 UNIT/ML
500 SYRINGE INTRAVENOUS
Status: DISCONTINUED | OUTPATIENT
Start: 2024-08-14 | End: 2024-08-14 | Stop reason: HOSPADM

## 2024-08-14 RX ADMIN — Medication 10 ML: at 02:08

## 2024-08-14 RX ADMIN — HEPARIN 500 UNITS: 100 SYRINGE at 02:08

## 2024-08-14 NOTE — PLAN OF CARE
1452 Patient disconnected from her home infusion pump without incident. Vitals stable. Reservoir volume at 0. Port heparin locked and deaccessed, blood return noted. Patient aware of her next appointment date/time. To contact provider with questions or concerns. D/C ambulatory and stable.

## 2024-08-26 ENCOUNTER — PATIENT MESSAGE (OUTPATIENT)
Dept: HEMATOLOGY/ONCOLOGY | Facility: CLINIC | Age: 49
End: 2024-08-26
Payer: COMMERCIAL

## 2024-08-27 ENCOUNTER — LAB VISIT (OUTPATIENT)
Dept: LAB | Facility: HOSPITAL | Age: 49
End: 2024-08-27
Attending: INTERNAL MEDICINE
Payer: COMMERCIAL

## 2024-08-27 ENCOUNTER — OFFICE VISIT (OUTPATIENT)
Dept: HEMATOLOGY/ONCOLOGY | Facility: CLINIC | Age: 49
End: 2024-08-27
Payer: COMMERCIAL

## 2024-08-27 DIAGNOSIS — D50.9 MICROCYTIC ANEMIA: ICD-10-CM

## 2024-08-27 DIAGNOSIS — C20 RECTAL CANCER: ICD-10-CM

## 2024-08-27 DIAGNOSIS — K58.1 IRRITABLE BOWEL SYNDROME WITH CONSTIPATION: ICD-10-CM

## 2024-08-27 DIAGNOSIS — C78.02 MALIGNANT NEOPLASM METASTATIC TO LEFT LUNG: ICD-10-CM

## 2024-08-27 DIAGNOSIS — C20 RECTAL ADENOCARCINOMA: Primary | ICD-10-CM

## 2024-08-27 DIAGNOSIS — F41.1 GENERALIZED ANXIETY DISORDER: ICD-10-CM

## 2024-08-27 DIAGNOSIS — I10 HYPERTENSION, UNSPECIFIED TYPE: ICD-10-CM

## 2024-08-27 DIAGNOSIS — E78.5 HYPERLIPIDEMIA, UNSPECIFIED HYPERLIPIDEMIA TYPE: ICD-10-CM

## 2024-08-27 DIAGNOSIS — C20 RECTAL ADENOCARCINOMA: ICD-10-CM

## 2024-08-27 LAB
ALBUMIN SERPL BCP-MCNC: 3.8 G/DL (ref 3.5–5.2)
ALP SERPL-CCNC: 105 U/L (ref 55–135)
ALT SERPL W/O P-5'-P-CCNC: 22 U/L (ref 10–44)
ANION GAP SERPL CALC-SCNC: 11 MMOL/L (ref 8–16)
AST SERPL-CCNC: 22 U/L (ref 10–40)
BACTERIA #/AREA URNS HPF: NORMAL /HPF
BILIRUB SERPL-MCNC: 0.4 MG/DL (ref 0.1–1)
BILIRUB UR QL STRIP: NEGATIVE
BUN SERPL-MCNC: 11 MG/DL (ref 6–20)
CALCIUM SERPL-MCNC: 9.7 MG/DL (ref 8.7–10.5)
CEA SERPL-MCNC: 3.1 NG/ML (ref 0–5)
CHLORIDE SERPL-SCNC: 101 MMOL/L (ref 95–110)
CLARITY UR: ABNORMAL
CO2 SERPL-SCNC: 28 MMOL/L (ref 23–29)
COLOR UR: YELLOW
CREAT SERPL-MCNC: 0.8 MG/DL (ref 0.5–1.4)
ERYTHROCYTE [DISTWIDTH] IN BLOOD BY AUTOMATED COUNT: 13.9 % (ref 11.5–14.5)
EST. GFR  (NO RACE VARIABLE): >60 ML/MIN/1.73 M^2
GLUCOSE SERPL-MCNC: 112 MG/DL (ref 70–110)
GLUCOSE UR QL STRIP: NEGATIVE
HCT VFR BLD AUTO: 40.8 % (ref 37–48.5)
HGB BLD-MCNC: 13.6 G/DL (ref 12–16)
HGB UR QL STRIP: NEGATIVE
IMM GRANULOCYTES # BLD AUTO: 0.02 K/UL (ref 0–0.04)
KETONES UR QL STRIP: NEGATIVE
LEUKOCYTE ESTERASE UR QL STRIP: ABNORMAL
MCH RBC QN AUTO: 26.7 PG (ref 27–31)
MCHC RBC AUTO-ENTMCNC: 33.3 G/DL (ref 32–36)
MCV RBC AUTO: 80 FL (ref 82–98)
MICROSCOPIC COMMENT: NORMAL
NEUTROPHILS # BLD AUTO: 4.7 K/UL (ref 1.8–7.7)
NITRITE UR QL STRIP: POSITIVE
PH UR STRIP: 6 [PH] (ref 5–8)
PLATELET # BLD AUTO: 253 K/UL (ref 150–450)
PMV BLD AUTO: 10.9 FL (ref 9.2–12.9)
POTASSIUM SERPL-SCNC: 4.2 MMOL/L (ref 3.5–5.1)
PROT SERPL-MCNC: 7.1 G/DL (ref 6–8.4)
PROT UR QL STRIP: ABNORMAL
RBC # BLD AUTO: 5.09 M/UL (ref 4–5.4)
SODIUM SERPL-SCNC: 140 MMOL/L (ref 136–145)
SP GR UR STRIP: 1.02 (ref 1–1.03)
SQUAMOUS #/AREA URNS HPF: 3 /HPF
URN SPEC COLLECT METH UR: ABNORMAL
UROBILINOGEN UR STRIP-ACNC: NEGATIVE EU/DL
WBC # BLD AUTO: 7.56 K/UL (ref 3.9–12.7)
WBC #/AREA URNS HPF: 5 /HPF (ref 0–5)

## 2024-08-27 PROCEDURE — 80053 COMPREHEN METABOLIC PANEL: CPT | Performed by: INTERNAL MEDICINE

## 2024-08-27 PROCEDURE — G2211 COMPLEX E/M VISIT ADD ON: HCPCS | Mod: 95,,, | Performed by: PHYSICIAN ASSISTANT

## 2024-08-27 PROCEDURE — 1159F MED LIST DOCD IN RCRD: CPT | Mod: CPTII,95,, | Performed by: PHYSICIAN ASSISTANT

## 2024-08-27 PROCEDURE — 81000 URINALYSIS NONAUTO W/SCOPE: CPT | Performed by: INTERNAL MEDICINE

## 2024-08-27 PROCEDURE — 82378 CARCINOEMBRYONIC ANTIGEN: CPT | Performed by: INTERNAL MEDICINE

## 2024-08-27 PROCEDURE — 99215 OFFICE O/P EST HI 40 MIN: CPT | Mod: 95,,, | Performed by: PHYSICIAN ASSISTANT

## 2024-08-27 PROCEDURE — 1160F RVW MEDS BY RX/DR IN RCRD: CPT | Mod: CPTII,95,, | Performed by: PHYSICIAN ASSISTANT

## 2024-08-27 PROCEDURE — 36415 COLL VENOUS BLD VENIPUNCTURE: CPT | Performed by: INTERNAL MEDICINE

## 2024-08-27 PROCEDURE — 85027 COMPLETE CBC AUTOMATED: CPT | Performed by: INTERNAL MEDICINE

## 2024-08-27 RX ORDER — EPINEPHRINE 0.3 MG/.3ML
0.3 INJECTION SUBCUTANEOUS ONCE AS NEEDED
Status: CANCELLED | OUTPATIENT
Start: 2024-08-27

## 2024-08-27 RX ORDER — SODIUM CHLORIDE 0.9 % (FLUSH) 0.9 %
10 SYRINGE (ML) INJECTION
Status: CANCELLED | OUTPATIENT
Start: 2024-08-29

## 2024-08-27 RX ORDER — PROCHLORPERAZINE EDISYLATE 5 MG/ML
10 INJECTION INTRAMUSCULAR; INTRAVENOUS ONCE AS NEEDED
Status: CANCELLED | OUTPATIENT
Start: 2024-08-27

## 2024-08-27 RX ORDER — HEPARIN 100 UNIT/ML
500 SYRINGE INTRAVENOUS
Status: CANCELLED | OUTPATIENT
Start: 2024-08-29

## 2024-08-27 RX ORDER — PROCHLORPERAZINE EDISYLATE 5 MG/ML
10 INJECTION INTRAMUSCULAR; INTRAVENOUS ONCE AS NEEDED
Status: CANCELLED | OUTPATIENT
Start: 2024-08-29

## 2024-08-27 RX ORDER — DIPHENHYDRAMINE HYDROCHLORIDE 50 MG/ML
50 INJECTION INTRAMUSCULAR; INTRAVENOUS ONCE AS NEEDED
Status: CANCELLED | OUTPATIENT
Start: 2024-08-27

## 2024-08-27 RX ORDER — HEPARIN 100 UNIT/ML
500 SYRINGE INTRAVENOUS
Status: CANCELLED | OUTPATIENT
Start: 2024-08-27

## 2024-08-27 RX ORDER — SODIUM CHLORIDE 0.9 % (FLUSH) 0.9 %
10 SYRINGE (ML) INJECTION
Status: CANCELLED | OUTPATIENT
Start: 2024-08-27

## 2024-08-27 NOTE — PROGRESS NOTES
The patient location is: Car  The chief complaint leading to consultation is: Rectal cancer    Visit type: audiovisual    Face to Face time with patient: 15 minutes of total time spent on the encounter, which includes face to face time and non-face to face time preparing to see the patient (eg, review of tests), Obtaining and/or reviewing separately obtained history, Documenting clinical information in the electronic or other health record, Independently interpreting results (not separately reported) and communicating results to the patient/family/caregiver, or Care coordination (not separately reported).     Each patient to whom he or she provides medical services by telemedicine is:  (1) informed of the relationship between the physician and patient and the respective role of any other health care provider with respect to management of the patient; and (2) notified that he or she may decline to receive medical services by telemedicine and may withdraw from such care at any time.    Notes:        MEDICAL ONCOLOGY - NEW PATIENT VISIT    Reason for visit: rectal cancer     Best Contact Phone Number(s): [phone] (home)      Cancer/Stage/TNM:    Cancer Staging   Rectal adenocarcinoma  Staging form: Colon and Rectum, AJCC 8th Edition  - Clinical stage from 6/21/2024: Stage ANVID (cT2, cN2b, cM1a) - Signed by Dion Rudolph MD on 7/24/2024  - Pathologic: No stage assigned - Unsigned       Oncology History   Rectal adenocarcinoma   6/21/2024 Procedure    Colonoscopy - mild diverticular disease, mass in distal rectum (Complete)     6/21/2024 Cancer Staged    Staging form: Colon and Rectum, AJCC 8th Edition  - Clinical stage from 6/21/2024: Stage NAVID (cT2, cN2b, cM1a)     6/24/2024 Initial Diagnosis    Rectal adenocarcinoma  ERROL  - Colonoscopy 6/21/24: infiltrative, ulcerated, sub-mucosal, fungating and villous bleeding mass found in distal rectum, 6-7cm from the anus proximally.   - Distal rectal mass biopsy  6/21/24: well differentiated adenocarcinoma, negative for LVI and perineural invasion. IHC MSI-H low probability with intact nuclear expressions of MLH1, MSH2, MSH6, and PMS2.        7/15/2024 Imaging Significant Findings    MRI Rectal cancer  Low rectal tumor invading the internal sphincter in close approximation to the mesorectal fascia without definite involvement.  Multiple suspicious mesorectal and superior rectal nodes.     *MR STAGE     T: T1/T2 (tumor confined to rectal wall)     N: N+ (short axis ? 9 mm)     Blayne statement: Multiple suspicious mesorectal and superior rectal nodes.     *MRF: threatened (tumor margin within 1-2 mm of MRF) noting this is a low rectal tumor.  No definite evidence of invasion.     Sphincter Involvement: Yes involvement the internal sphincter     Suspicious Extramesorectal Lymph Nodes: No     EMVI: No     7/16/2024 Tumor Markers    Patient's tumor was tested for the following markers: CEA                                              Results of the tumor marker test revealed 2.9     7/17/2024 Imaging Significant Findings    CT CAP  In this patient with a known history of rectal malignancy, there are few borderline prominent lymph nodes adjacent to the rectum measuring up to 8 mm.  There are solid nodules in both lungs, the largest in the lingula measuring up to 1 cm.  Given the patient's primary malignancy, pulmonary metastatic disease cannot be excluded.  Follow-up based upon oncologic guidance.     Prominent soft tissue density in the anterior superior mediastinum which could possibly represent residual thymic tissue with a focal solid structure measuring up to 1.0 x 1.7 cm, possibly an enlarged lymph node versus other soft tissue mass.     Indeterminate focal regions of sclerosis within the T10 vertebra and right sacrum.  Osseous metastatic disease cannot be excluded.     Cholelithiasis without CT evidence for cholecystitis.     Large hiatal hernia.  Diverticulosis coli  without diverticulitis.     Large left UPJ stone measuring up to 1.2 cm without hydronephrosis or hydroureter.     7/19/2024 Imaging Significant Findings    PET CT  Hypermetabolic soft tissue thickening along the rectal wall compatible with known malignancy.  There are a few prominent perirectal mesorectal lymph nodes without significant hypermetabolic activity, please note some may be too small for characterization by PET.     0.9 cm lingular pulmonary nodule with mild hypermetabolic activity and prominent hypermetabolic mediastinal lymph node, concerning for metastatic foci.     8/12/2024 -  Chemotherapy    Treatment Summary   Plan Name: OP GI [id number] (oxaliplatin leucovorin fluorouracil) with bevacizumab Q2W  Treatment Goal: Palliative  Status: Active  Start Date: 8/12/2024  End Date: 7/1/2025 (Planned)  Provider: Dion Rudolph MD  Chemotherapy: oxaliplatin (ELOXATIN) 85 mg/m2 = 194 mg in D5W 603.8 mL chemo infusion, 85 mg/m2 = 194 mg, Intravenous, Clinic/HOD 1 time, 1 of 24 cycles  Administration: 194 mg (8/12/2024)  fluorouracil (Adrucil) 2,400 mg/m2 = 5,470 mg in 0.9% NaCl 240 mL chemo infusion, 2,400 mg/m2 = 5,470 mg, Intravenous, Clinic/HOD 1 time, 1 of 24 cycles  Administration: 5,470 mg (8/12/2024)  bevacizumab-awwb (MVASI) 500 mg in 0.9% NaCl 100 mL infusion, 550 mg, Intravenous, Clinic/HOD 1 time, 1 of 24 cycles  Administration: 500 mg (8/12/2024)        Oncological History copied from medical chart:    48 y.o. female with HTN, HLD, ANANT, allergic rhinitis who presents for further evaluation of newly diagnosed rectal cancer.     She was in her usual health and went to PCP for annual screening earlier this year. PCP recommended screening colonoscopy as she is >45. 2 weeks prior to her screening colonoscopy, she noticed hematochezia and rectal pain. Her only symptom prior to this was constipation, which is lifelong. She presents today to start FOLFOX plus avastin per recommendations from TB. S/p  cycle 1 of FOLFOX.     Interval History:   MS Segura returns prior to cycle 2. She actually tolerated treatment very well. She had good energy and was eating. Around day 4, however, she began to have increasing fatigue and weakness. She was still able to function well and do things around her home. She had minimal nausea without vomiting. She is feeling better now. She is eating and following with the nutritionist. No constipation or diarrhea. No cold sensitivity or neuropathy. No pain. Overall, doing well. No other concerns or complaints.     Family History: 4 uncles and and aunt with cancer, including melanoma, esophageal, throat, unknown, and RCC.   Social History: . Lifelong nonsmoker. Social alcohol use. Denies illicit drug use.     Patient presents alone.  ECOG PS is 0.  History has been obtained by chart review and discussion with the patient.    ROS:   Review of Systems   Constitutional:  Positive for malaise/fatigue and weight loss. Negative for fever.   HENT:  Negative for hearing loss.    Eyes:  Negative for blurred vision.   Respiratory:  Negative for cough and hemoptysis.    Cardiovascular:  Negative for chest pain.   Gastrointestinal:  Positive for blood in stool and constipation. Negative for abdominal pain, nausea and vomiting.        Rectal bleed, rectal pain / discomfort    Genitourinary:  Negative for dysuria, frequency and urgency.   Musculoskeletal:  Negative for myalgias.   Skin:  Negative for rash.   Neurological:  Negative for dizziness, tingling, weakness and headaches.   Endo/Heme/Allergies:  Does not bruise/bleed easily.   Psychiatric/Behavioral:  Negative for depression.        Past Medical History:   Past Medical History:   Diagnosis Date    Anxiety disorder, unspecified     Fibroids     HTN (hypertension)     Sinus problem         Past Surgical History:   Past Surgical History:   Procedure Laterality Date    BILATERAL SALPINGO-OOPHORECTOMY (BSO) Bilateral 12/9/2020    Procedure:  SALPINGO-OOPHORECTOMY, BILATERAL;  Surgeon: Yun Recio MD;  Location: [facility] OR;  Service: OB/GYN;  Laterality: Bilateral;  COVID NEGATIVE 12/3    INCISION OF UVULA      INSERTION OF TUNNELED CENTRAL VENOUS CATHETER (CVC) WITH SUBCUTANEOUS PORT Right 7/29/2024    Procedure: INSERTION, SINGLE LUMEN CATHETER WITH PORT, WITH FLUOROSCOPIC GUIDANCE;  Surgeon: Russel Layton MD;  Location: [facility] OR [address] [place] [address];  Service: General;  Laterality: Right;    knee arhroscopy      NASAL SEPTUM SURGERY      SINUS SURGERY      TONSILLECTOMY      TOTAL ABDOMINAL HYSTERECTOMY N/A 12/9/2020    Procedure: HYSTERECTOMY, TOTAL, ABDOMINAL;  Surgeon: Yun Recio MD;  Location: [facility] OR;  Service: OB/GYN;  Laterality: N/A;    UTERINE FIBROID SURGERY          Family History:   Family History   Problem Relation Name Age of Onset    Asthma Mother      Hypertension Mother      COPD Father      Diabetes Father      Hypertension Father      Cancer Maternal Uncle      Colon cancer Neg Hx          Social History:   Social History     Tobacco Use    Smoking status: Never    Smokeless tobacco: Never   Substance Use Topics    Alcohol use: Yes     Comment: Socially        I have reviewed and updated the patient's past medical, surgical, family and social histories.    Allergies:   Review of patient's allergies indicates:  No Known Allergies     Medications:   Current Outpatient Medications   Medication Sig Dispense Refill    cetirizine (ZYRTEC) 10 MG tablet Take 10 mg by mouth daily as needed.       cholecalciferol, vitamin D3, 5,000 unit Tab Take 5,000 Units by mouth once daily.      citalopram (CELEXA) 20 MG tablet Take 20 mg by mouth every morning.      EVAMIST 1.53 mg/spray (1.7%) transdermal spray SPRAY 2 SPRAYS TO FOREARM DAILY      fluticasone (FLONASE) 50 mcg/actuation nasal spray 1 spray (50 mcg total) by Each Nare route once daily. 16 g 0    hydroCHLOROthiazide (HYDRODIURIL) 25 MG tablet Take 25 mg by mouth every morning.       LIDOcaine-prilocaine (EMLA) cream Apply topically as needed. 30 g 3    niacin 50 MG Tab niacin      niacin 50 MG Tab       omega 3-dha-epa-fish oil (FISH OIL) 1,000 mg (120 mg-180 mg) Cap Take by mouth once daily.      ondansetron (ZOFRAN-ODT) 8 MG TbDL Take 1 tablet (8 mg total) by mouth every 6 (six) hours as needed (chemotherapy induced nausea/vomiting). 60 tablet 4    oxyCODONE (ROXICODONE) 5 MG immediate release tablet Take 1 tablet (5 mg total) by mouth every 6 (six) hours as needed for Pain. 3 tablet 0    promethazine (PHENERGAN) 25 MG tablet Take 1 tablet (25 mg total) by mouth every 6 (six) hours as needed for Nausea (chemotherapy induced nausea/vomiting). 60 tablet 7    rosuvastatin (CRESTOR) 10 MG tablet Take by mouth every evening.       No current facility-administered medications for this visit.        Physical Exam:   [facility] 11/18/2020                Physical Exam  Vitals and nursing note reviewed.   Constitutional:       General: She is not in acute distress.     Appearance: Normal appearance. She is well-developed and normal weight. She is not ill-appearing.   HENT:      Head: Normocephalic and atraumatic.      Right Ear: External ear normal.      Left Ear: External ear normal.      Mouth/Throat:      Mouth: Mucous membranes are moist.      Pharynx: Oropharynx is clear.   Eyes:      General: No scleral icterus.     Extraocular Movements: Extraocular movements intact.      Conjunctiva/sclera: Conjunctivae normal.      Pupils: Pupils are equal, round, and reactive to light.   Cardiovascular:      Rate and Rhythm: Normal rate and regular rhythm.      Heart sounds: No murmur heard.     No friction rub. No gallop.   Pulmonary:      Effort: Pulmonary effort is normal. No respiratory distress.      Breath sounds: Normal breath sounds. No stridor. No wheezing, rhonchi or rales.   Chest:      Chest wall: No tenderness.   Abdominal:      General: Abdomen is flat. Bowel sounds are normal. There is no  distension.      Palpations: Abdomen is soft. There is no mass.      Tenderness: There is no abdominal tenderness. There is no guarding or rebound.   Musculoskeletal:         General: No swelling, tenderness or deformity. Normal range of motion.      Cervical back: Normal range of motion and neck supple.      Comments: BLE swelling but no edema    Skin:     General: Skin is warm and dry.      Capillary Refill: Capillary refill takes less than 2 seconds.      Findings: No erythema or rash.   Neurological:      Mental Status: She is alert and oriented to person, place, and time.      Gait: Gait is intact.   Psychiatric:         Behavior: Behavior normal.         Thought Content: Thought content normal.         Judgment: Judgment normal.           Labs:   Recent Results (from the past 48 hour(s))   CBC Oncology    Collection Time: 08/27/24  9:48 AM   Result Value Ref Range    WBC 7.56 3.90 - 12.70 K/uL    RBC 5.09 4.00 - 5.40 M/uL    Hemoglobin 13.6 12.0 - 16.0 g/dL    Hematocrit 40.8 37.0 - 48.5 %    MCV 80 (L) 82 - 98 fL    MCH 26.7 (L) 27.0 - 31.0 pg    MCHC 33.3 32.0 - 36.0 g/dL    RDW 13.9 11.5 - 14.5 %    Platelets 253 150 - 450 K/uL    MPV 10.9 9.2 - 12.9 fL    Gran # (ANC) 4.7 1.8 - 7.7 K/uL    Immature Grans (Abs) 0.02 0.00 - 0.04 K/uL   Comprehensive Metabolic Panel    Collection Time: 08/27/24  9:48 AM   Result Value Ref Range    Sodium 140 136 - 145 mmol/L    Potassium 4.2 3.5 - 5.1 mmol/L    Chloride 101 95 - 110 mmol/L    CO2 28 23 - 29 mmol/L    Glucose 112 (H) 70 - 110 mg/dL    BUN 11 6 - 20 mg/dL    Creatinine 0.8 0.5 - 1.4 mg/dL    Calcium 9.7 8.7 - 10.5 mg/dL    Total Protein 7.1 6.0 - 8.4 g/dL    Albumin 3.8 3.5 - 5.2 g/dL    Total Bilirubin 0.4 0.1 - 1.0 mg/dL    Alkaline Phosphatase 105 55 - 135 U/L    AST 22 10 - 40 U/L    ALT 22 10 - 44 U/L    eGFR >60 >60 mL/min/1.73 m^2    Anion Gap 11 8 - 16 mmol/L   Urinalysis    Collection Time: 08/27/24  9:48 AM   Result Value Ref Range    Specimen UA  Urine, Clean Catch     Color, UA Yellow Yellow, Straw, Norma    Appearance, UA Hazy (A) Clear    pH, UA 6.0 5.0 - 8.0    Specific Gravity, UA 1.025 1.005 - 1.030    Protein, UA Trace (A) Negative    Glucose, UA Negative Negative    Ketones, UA Negative Negative    Bilirubin (UA) Negative Negative    Occult Blood UA Negative Negative    Nitrite, UA Positive (A) Negative    Urobilinogen, UA Negative <2.0 EU/dL    Leukocytes, UA 1+ (A) Negative   Urinalysis Microscopic    Collection Time: 08/27/24  9:48 AM   Result Value Ref Range    WBC, UA 5 0 - 5 /hpf    Bacteria Occasional None-Occ /hpf    Squam Epithel, UA 3 /hpf    Microscopic Comment SEE COMMENT      Imaging:      PET/CT: 7/19/2024:    Impression:     Hypermetabolic soft tissue thickening along the rectal wall compatible with known malignancy.  There are a few prominent perirectal mesorectal lymph nodes without significant hypermetabolic activity, please note some may be too small for characterization by PET.     0.9 cm lingular pulmonary nodule with mild hypermetabolic activity and prominent hypermetabolic mediastinal lymph node, concerning for metastatic foci.    CT CAP: 7/17/20204:    Impression:     In this patient with a known history of rectal malignancy, there are few borderline prominent lymph nodes adjacent to the rectum measuring up to 8 mm.  There are solid nodules in both lungs, the largest in the lingula measuring up to 1 cm.  Given the patient's primary malignancy, pulmonary metastatic disease cannot be excluded.  Follow-up based upon oncologic guidance.     Prominent soft tissue density in the anterior superior mediastinum which could possibly represent residual thymic tissue with a focal solid structure measuring up to 1.0 x 1.7 cm, possibly an enlarged lymph node versus other soft tissue mass.     Indeterminate focal regions of sclerosis within the T10 vertebra and right sacrum.  Osseous metastatic disease cannot be excluded.     Cholelithiasis  without CT evidence for cholecystitis.     Large hiatal hernia.  Diverticulosis coli without diverticulitis.     Large left UPJ stone measuring up to 1.2 cm without hydronephrosis or hydroureter.        Electronically signed by:Flor Mcgee MD  Date:                                            07/17/2024  Time:                                           10:00    MRI Rectal Cancer Without Contrast    Result Date: 7/15/2024  EXAMINATION: MRI RECTAL CANCER WITHOUT CONTRAST CLINICAL HISTORY: Malignant neoplasm of rectum TECHNIQUE: Multiplanar multi sequence images of the pelvis were obtained per rectal tumor protocol. COMPARISON: None FINDINGS: COLONOSCOPY FINDINGS: N/a MRI FINDINGS: Tumor Location: Lower (0-5 cm) Tumor location (distance from anal verge): Low (0-5.0 cm) cm Distance from top of anal canal (level of puborectalis at anorectal junction) to the inferior edge of the tumor: 0 cm Relationship to anterior peritoneal reflection: Below Tumor Characteristics: Morphology: Semiannular Circumferential extent/location: 03 - 9 o'clock Tumor Length: 5.6 cm Mucinous: No *MR-T CATEGORY: T1/T2 (tumor confined to rectal wall) LOW RECTAL TUMORS: The lower extent of the tumor is at or below the upper border of the puborectalis sling. The most penetrating component of the tumor invades: internal sphincter only. *MESORECTAL FASCIA (MRF): The distance of the most penetrating component of tumor to the mesorectal fascia is 1-2 mm - no invasion noting this is a low rectal tumor. *LYMPH NODES: Mesorectal/superior rectal lymph nodes): Multiple suspicious mesorectal nodes as follows: -Few superior rectal nodes measuring 0.4 cm short axis with heterogeneous signal intensity and rounded morphology (sequence 2 image 16 and 17) -left mesorectal node measuring 0.7 cm in short axis with heterogeneous signal intensity in rounded morphology (sequence 5 image 34) -2 left mesorectal node measuring 0.5 cm in short axis with heterogeneous signal  intensity (sequence 5 image 32, 33). -left mesorectal node measuring 0.5 cm in short axis with irregular borders and heterogeneous signal intensity (sequence 5 image 33). -3 right mesorectal nodes measuring 0.4 cm in short axis with heterogeneous signal intensity and rounded morphology (sequence 5 image 31, 33, 26,). *MR-N CATEGORY: N+ (short axis 5-9 mm AND at least 2 morphologic criteria) Extra-mesorectal nodes (Int. Iliac/Obturator >= 7 mm): No nodes measuring above 7 mm Non-locoregional nodes (Ext. Iliac/Inguinal > 1cm): None *EXTRAMURAL VENOUS INVASION (EMVI): Negative. No tumor extension in the vicinity of any vascular structure. Include the distance and clock face position of the EMVI in relation to the MRF Other: Sigmoid diverticulosis.  Postop changes of hysterectomy.     Low rectal tumor invading the internal sphincter in close approximation to the mesorectal fascia without definite involvement.  Multiple suspicious mesorectal and superior rectal nodes. *MR STAGE T: T1/T2 (tumor confined to rectal wall) N: N+ (short axis ? 9 mm) Blayne statement: Multiple suspicious mesorectal and superior rectal nodes. *MRF: threatened (tumor margin within 1-2 mm of MRF) noting this is a low rectal tumor.  No definite evidence of invasion. Sphincter Involvement: Yes involvement the internal sphincter Suspicious Extramesorectal Lymph Nodes: No EMVI: No Electronically signed by resident: Gloria Knapp Date:    07/15/2024 Time:    11:14 Electronically signed by: Homer Yanez MD Date:    07/15/2024 Time:    12:48      Path:   Distal rectal mass biopsy 6/21/24: well differentiated adenocarcinoma, negative for LVI and perineural invasion. IHC MSI-H low probability with intact nuclear expressions of MLH1, MSH2, MSH6, and PMS2.         Assessment:       1. Rectal adenocarcinoma    2. Malignant neoplasm metastatic to left lung    3. Microcytic anemia    4. Generalized anxiety disorder    5. Hypertension, unspecified type     6. Hyperlipidemia, unspecified hyperlipidemia type    7. Irritable bowel syndrome with constipation              Plan:             # Rectal adenocarcinoma  Imelda Kimble is a 48 y.o. woman with at least Stage IIIB (cT2, cN2b, cM0) distal G1 adenocarcinoma pMMR, 5.6 cm long, below the anterior peritoneal reflection, MRF threatened, with involvement the internal sphincter, EMVI-, with multiple (8+) small suspicious mesorectal and superior rectal nodes.     She was in her usual health and went to PCP for annual screening earlier this year. PCP recommended screening colonoscopy as she is >45. 2 weeks prior to her screening colonoscopy, she noticed hematochezia and rectal pain. Her only symptom prior to this was constipation, which is lifelong. She was seen by Dr. Menendez with CRS and Dr Lopez in Rad Onc for further treatment planning. After discussion, the following was recommended:   Long-course DAVE > restaging. Will f/u with him 1 month after DAVE with repeat MRI and CT or according to trial protocol. She was also presented to the TB as well, suggesting to, induction chemotherapy, long course chemoradiation and evaluation for SBRT vs lobectomy after re-staging.     Per Dr Lopez as well, long course radiation therapy with Xeloda and 25-30 daily fractions M-F, using IMRT to decrease small bowel and bladder dose. Patient also appears to be candidate for the [facility] rectal cancer trial ([place] [id number]) with DAVE testing FOLFOX with or without Irinotecan  She presents today to start FOLFOX plus avastin per recommendations from TB. Had port placed on 7/29/2024    - Doing well. Did very well with cycle 1. Eating well and has a good appetite.   - Lab work reviewed and adequate to start cycle 2 tomorrow at [facility]  - Proceed with cycle 2 of FOLFOX  plus avastin    RTC in 2 weeks. Pt would like to continue treatment [place]    # Microcytic anemia   Suspect MICHELLE.   - Reviewed iron panel and ferritin   - will continue  for IV iron    Other chronic disease managed by PCP   # Generalized anxiety disorder - continue citalopram   # HTN - HCTZ   # HLD - statin  # allergic rhinitis - zyrtec and flonase, stable   # IBS-Constipation     Follow up: 2 weeks for cycle 3    The above information has been reviewed with the patient and all questions have been answered to their apparent satisfaction.  She understands that she can call the clinic with any questions.        VIKASH Guzman, PA-C [facility] [place]  Dept of Hematology/Oncology  PAARELY to GI Oncology team           Route Chart for Scheduling    Med Onc Chart Routing      Follow up with physician 6 weeks and 2 months. FOLFOX plus avastin with lab work, CT CAP and treatment discussion. 8 weeks, FOLFOX plus avastin   Follow up with LEIDA 2 weeks and 4 weeks. FOLFOX plus avastin   Infusion scheduling note    Injection scheduling note    Labs CBC and CMP   Scheduling:  Preferred lab:  Lab interval: every 2 weeks     Imaging    Pharmacy appointment    Other referrals                  Treatment Plan Information   OP GI mFOLFOX6 (oxaliplatin leucovorin fluorouracil) with bevacizumab Q2W Dion Rudolph MD   Associated diagnosis: Rectal adenocarcinoma Stage NAVID cT2, cN2b, cM1a noted on 7/15/2024   Line of treatment: First Line  Treatment Goal: Palliative     Upcoming Treatment Dates - OP GI mFOLFOX6 (oxaliplatin leucovorin fluorouracil) with bevacizumab Q2W    8/25/2024       Chemotherapy       bevacizumab-awwb (MVASI) 5 mg/kg = 550 mg in 0.9% NaCl 100 mL infusion       oxaliplatin (ELOXATIN) 85 mg/m2 = 194 mg in D5W 538.8 mL chemo infusion       fluorouracil (Adrucil) 2,400 mg/m2 = 5,470 mg in 0.9% NaCl 240 mL chemo infusion       Antiemetics       palonosetron (ALOXI) 0.25 mg with Dexamethasone (DECADRON) 12 mg in NS 50 mL IVPB  9/8/2024       Chemotherapy       bevacizumab-awwb (MVASI) 5 mg/kg = 550 mg in 0.9% NaCl 100 mL infusion       oxaliplatin (ELOXATIN) 85 mg/m2 = 194 mg in  D5W 538.8 mL chemo infusion       fluorouracil (Adrucil) 2,400 mg/m2 = 5,470 mg in 0.9% NaCl 240 mL chemo infusion       Antiemetics       palonosetron (ALOXI) 0.25 mg with Dexamethasone (DECADRON) 12 mg in NS 50 mL IVPB  9/22/2024       Chemotherapy       bevacizumab-awwb (MVASI) 5 mg/kg = 550 mg in 0.9% NaCl 100 mL infusion       oxaliplatin (ELOXATIN) 85 mg/m2 = 194 mg in D5W 538.8 mL chemo infusion       fluorouracil (Adrucil) 2,400 mg/m2 = 5,470 mg in 0.9% NaCl 240 mL chemo infusion       Antiemetics       palonosetron (ALOXI) 0.25 mg with Dexamethasone (DECADRON) 12 mg in NS 50 mL IVPB  10/6/2024       Chemotherapy       bevacizumab-awwb (MVASI) 5 mg/kg = 550 mg in 0.9% NaCl 100 mL infusion       oxaliplatin (ELOXATIN) 85 mg/m2 = 194 mg in D5W 538.8 mL chemo infusion       fluorouracil (Adrucil) 2,400 mg/m2 = 5,470 mg in 0.9% NaCl 240 mL chemo infusion       Antiemetics       palonosetron (ALOXI) 0.25 mg with Dexamethasone (DECADRON) 12 mg in NS 50 mL IVPB

## 2024-08-28 ENCOUNTER — PATIENT MESSAGE (OUTPATIENT)
Dept: HEMATOLOGY/ONCOLOGY | Facility: CLINIC | Age: 49
End: 2024-08-28
Payer: COMMERCIAL

## 2024-09-10 ENCOUNTER — OFFICE VISIT (OUTPATIENT)
Dept: HEMATOLOGY/ONCOLOGY | Facility: CLINIC | Age: 49
End: 2024-09-10
Payer: COMMERCIAL

## 2024-09-10 ENCOUNTER — LAB VISIT (OUTPATIENT)
Dept: LAB | Facility: HOSPITAL | Age: 49
End: 2024-09-10
Payer: COMMERCIAL

## 2024-09-10 VITALS
HEART RATE: 79 BPM | WEIGHT: 227.94 LBS | OXYGEN SATURATION: 99 % | DIASTOLIC BLOOD PRESSURE: 92 MMHG | TEMPERATURE: 98 F | HEIGHT: 67 IN | BODY MASS INDEX: 35.78 KG/M2 | RESPIRATION RATE: 16 BRPM | SYSTOLIC BLOOD PRESSURE: 118 MMHG

## 2024-09-10 DIAGNOSIS — F41.1 GENERALIZED ANXIETY DISORDER: ICD-10-CM

## 2024-09-10 DIAGNOSIS — C20 RECTAL ADENOCARCINOMA: ICD-10-CM

## 2024-09-10 DIAGNOSIS — E78.5 HYPERLIPIDEMIA, UNSPECIFIED HYPERLIPIDEMIA TYPE: ICD-10-CM

## 2024-09-10 DIAGNOSIS — C78.02 MALIGNANT NEOPLASM METASTATIC TO LEFT LUNG: ICD-10-CM

## 2024-09-10 DIAGNOSIS — K58.1 IRRITABLE BOWEL SYNDROME WITH CONSTIPATION: ICD-10-CM

## 2024-09-10 DIAGNOSIS — D50.9 MICROCYTIC ANEMIA: ICD-10-CM

## 2024-09-10 DIAGNOSIS — I10 HYPERTENSION, UNSPECIFIED TYPE: ICD-10-CM

## 2024-09-10 DIAGNOSIS — J30.9 ALLERGIC RHINITIS, UNSPECIFIED SEASONALITY, UNSPECIFIED TRIGGER: ICD-10-CM

## 2024-09-10 DIAGNOSIS — C20 RECTAL ADENOCARCINOMA: Primary | ICD-10-CM

## 2024-09-10 LAB
ALBUMIN SERPL BCP-MCNC: 3.7 G/DL (ref 3.5–5.2)
ALP SERPL-CCNC: 105 U/L (ref 55–135)
ALT SERPL W/O P-5'-P-CCNC: 25 U/L (ref 10–44)
ANION GAP SERPL CALC-SCNC: 9 MMOL/L (ref 8–16)
AST SERPL-CCNC: 21 U/L (ref 10–40)
BASOPHILS # BLD AUTO: 0.05 K/UL (ref 0–0.2)
BASOPHILS NFR BLD: 0.6 % (ref 0–1.9)
BILIRUB SERPL-MCNC: 0.3 MG/DL (ref 0.1–1)
BUN SERPL-MCNC: 14 MG/DL (ref 6–20)
CALCIUM SERPL-MCNC: 9.8 MG/DL (ref 8.7–10.5)
CEA SERPL-MCNC: 2.7 NG/ML (ref 0–5)
CHLORIDE SERPL-SCNC: 100 MMOL/L (ref 95–110)
CO2 SERPL-SCNC: 31 MMOL/L (ref 23–29)
CREAT SERPL-MCNC: 0.8 MG/DL (ref 0.5–1.4)
DIFFERENTIAL METHOD BLD: ABNORMAL
EOSINOPHIL # BLD AUTO: 0.2 K/UL (ref 0–0.5)
EOSINOPHIL NFR BLD: 2 % (ref 0–8)
ERYTHROCYTE [DISTWIDTH] IN BLOOD BY AUTOMATED COUNT: 14.1 % (ref 11.5–14.5)
EST. GFR  (NO RACE VARIABLE): >60 ML/MIN/1.73 M^2
GLUCOSE SERPL-MCNC: 95 MG/DL (ref 70–110)
HCT VFR BLD AUTO: 41 % (ref 37–48.5)
HGB BLD-MCNC: 13 G/DL (ref 12–16)
IMM GRANULOCYTES # BLD AUTO: 0.02 K/UL (ref 0–0.04)
IMM GRANULOCYTES NFR BLD AUTO: 0.3 % (ref 0–0.5)
LYMPHOCYTES # BLD AUTO: 1.7 K/UL (ref 1–4.8)
LYMPHOCYTES NFR BLD: 21.1 % (ref 18–48)
MCH RBC QN AUTO: 26.3 PG (ref 27–31)
MCHC RBC AUTO-ENTMCNC: 31.7 G/DL (ref 32–36)
MCV RBC AUTO: 83 FL (ref 82–98)
MONOCYTES # BLD AUTO: 1 K/UL (ref 0.3–1)
MONOCYTES NFR BLD: 13.1 % (ref 4–15)
NEUTROPHILS # BLD AUTO: 5 K/UL (ref 1.8–7.7)
NEUTROPHILS NFR BLD: 62.9 % (ref 38–73)
NRBC BLD-RTO: 0 /100 WBC
PLATELET # BLD AUTO: 212 K/UL (ref 150–450)
PMV BLD AUTO: 11.9 FL (ref 9.2–12.9)
POTASSIUM SERPL-SCNC: 3.7 MMOL/L (ref 3.5–5.1)
PROT SERPL-MCNC: 7.1 G/DL (ref 6–8.4)
RBC # BLD AUTO: 4.95 M/UL (ref 4–5.4)
SODIUM SERPL-SCNC: 140 MMOL/L (ref 136–145)
WBC # BLD AUTO: 7.87 K/UL (ref 3.9–12.7)

## 2024-09-10 PROCEDURE — 99215 OFFICE O/P EST HI 40 MIN: CPT | Mod: S$GLB,,, | Performed by: PHYSICIAN ASSISTANT

## 2024-09-10 PROCEDURE — 36415 COLL VENOUS BLD VENIPUNCTURE: CPT | Performed by: PHYSICIAN ASSISTANT

## 2024-09-10 PROCEDURE — 80053 COMPREHEN METABOLIC PANEL: CPT | Performed by: PHYSICIAN ASSISTANT

## 2024-09-10 PROCEDURE — 85025 COMPLETE CBC W/AUTO DIFF WBC: CPT | Performed by: PHYSICIAN ASSISTANT

## 2024-09-10 PROCEDURE — 3008F BODY MASS INDEX DOCD: CPT | Mod: CPTII,S$GLB,, | Performed by: PHYSICIAN ASSISTANT

## 2024-09-10 PROCEDURE — 82378 CARCINOEMBRYONIC ANTIGEN: CPT | Performed by: PHYSICIAN ASSISTANT

## 2024-09-10 PROCEDURE — 3074F SYST BP LT 130 MM HG: CPT | Mod: CPTII,S$GLB,, | Performed by: PHYSICIAN ASSISTANT

## 2024-09-10 PROCEDURE — 99999 PR PBB SHADOW E&M-EST. PATIENT-LVL IV: CPT | Mod: PBBFAC,,, | Performed by: PHYSICIAN ASSISTANT

## 2024-09-10 PROCEDURE — 3080F DIAST BP >= 90 MM HG: CPT | Mod: CPTII,S$GLB,, | Performed by: PHYSICIAN ASSISTANT

## 2024-09-10 PROCEDURE — 1160F RVW MEDS BY RX/DR IN RCRD: CPT | Mod: CPTII,S$GLB,, | Performed by: PHYSICIAN ASSISTANT

## 2024-09-10 PROCEDURE — 1159F MED LIST DOCD IN RCRD: CPT | Mod: CPTII,S$GLB,, | Performed by: PHYSICIAN ASSISTANT

## 2024-09-10 PROCEDURE — G2211 COMPLEX E/M VISIT ADD ON: HCPCS | Mod: S$GLB,,, | Performed by: PHYSICIAN ASSISTANT

## 2024-09-10 NOTE — PROGRESS NOTES
MEDICAL ONCOLOGY - NEW PATIENT VISIT    Reason for visit: rectal cancer     Best Contact Phone Number(s): [phone] (home)      Cancer/Stage/TNM:    Cancer Staging   Rectal adenocarcinoma  Staging form: Colon and Rectum, AJCC 8th Edition  - Clinical stage from 6/21/2024: Stage NAVID (cT2, cN2b, cM1a) - Signed by Dion Rudolph MD on 7/24/2024  - Pathologic: No stage assigned - Unsigned       Oncology History   Rectal adenocarcinoma   6/21/2024 Procedure    Colonoscopy - mild diverticular disease, mass in distal rectum (Complete)     6/21/2024 Cancer Staged    Staging form: Colon and Rectum, AJCC 8th Edition  - Clinical stage from 6/21/2024: Stage NAVID (cT2, cN2b, cM1a)     6/24/2024 Initial Diagnosis    Rectal adenocarcinoma  ERROL  - Colonoscopy 6/21/24: infiltrative, ulcerated, sub-mucosal, fungating and villous bleeding mass found in distal rectum, 6-7cm from the anus proximally.   - Distal rectal mass biopsy 6/21/24: well differentiated adenocarcinoma, negative for LVI and perineural invasion. IHC MSI-H low probability with intact nuclear expressions of MLH1, MSH2, MSH6, and PMS2.        7/15/2024 Imaging Significant Findings    MRI Rectal cancer  Low rectal tumor invading the internal sphincter in close approximation to the mesorectal fascia without definite involvement.  Multiple suspicious mesorectal and superior rectal nodes.     *MR STAGE     T: T1/T2 (tumor confined to rectal wall)     N: N+ (short axis ? 9 mm)     Blayne statement: Multiple suspicious mesorectal and superior rectal nodes.     *MRF: threatened (tumor margin within 1-2 mm of MRF) noting this is a low rectal tumor.  No definite evidence of invasion.     Sphincter Involvement: Yes involvement the internal sphincter     Suspicious Extramesorectal Lymph Nodes: No     EMVI: No     7/16/2024 Tumor Markers    Patient's tumor was tested for the following markers: CEA                                              Results of the tumor marker test  revealed 2.9     7/17/2024 Imaging Significant Findings    CT CAP  In this patient with a known history of rectal malignancy, there are few borderline prominent lymph nodes adjacent to the rectum measuring up to 8 mm.  There are solid nodules in both lungs, the largest in the lingula measuring up to 1 cm.  Given the patient's primary malignancy, pulmonary metastatic disease cannot be excluded.  Follow-up based upon oncologic guidance.     Prominent soft tissue density in the anterior superior mediastinum which could possibly represent residual thymic tissue with a focal solid structure measuring up to 1.0 x 1.7 cm, possibly an enlarged lymph node versus other soft tissue mass.     Indeterminate focal regions of sclerosis within the T10 vertebra and right sacrum.  Osseous metastatic disease cannot be excluded.     Cholelithiasis without CT evidence for cholecystitis.     Large hiatal hernia.  Diverticulosis coli without diverticulitis.     Large left UPJ stone measuring up to 1.2 cm without hydronephrosis or hydroureter.     7/19/2024 Imaging Significant Findings    PET CT  Hypermetabolic soft tissue thickening along the rectal wall compatible with known malignancy.  There are a few prominent perirectal mesorectal lymph nodes without significant hypermetabolic activity, please note some may be too small for characterization by PET.     0.9 cm lingular pulmonary nodule with mild hypermetabolic activity and prominent hypermetabolic mediastinal lymph node, concerning for metastatic foci.     8/12/2024 -  Chemotherapy    Treatment Summary   Plan Name: OP GI mFOLFOX6 (oxaliplatin leucovorin fluorouracil) with bevacizumab Q2W  Treatment Goal: Palliative  Status: Active  Start Date: 8/12/2024  End Date: 7/3/2025 (Planned)  Provider: Dion Rudolph MD  Chemotherapy: oxaliplatin (ELOXATIN) 85 mg/m2 = 194 mg in D5W 603.8 mL chemo infusion, 85 mg/m2 = 194 mg, Intravenous, Clinic/HOD 1 time, 2 of 24 cycles  Administration:  194 mg (8/12/2024), 194 mg (8/28/2024)  fluorouracil (Adrucil) 2,400 mg/m2 = 5,470 mg in 0.9% NaCl 240 mL chemo infusion, 2,400 mg/m2 = 5,470 mg, Intravenous, Clinic/HOD 1 time, 2 of 24 cycles  Administration: 5,470 mg (8/12/2024), 5,470 mg (8/28/2024)  bevacizumab-awwb (MVASI) 500 mg in 0.9% NaCl 100 mL infusion, 550 mg, Intravenous, Clinic/HOD 1 time, 2 of 24 cycles  Administration: 500 mg (8/12/2024), 500 mg (8/28/2024)        Oncological History copied from medical chart:    48 y.o. female with HTN, HLD, ANANT, allergic rhinitis who presents for further evaluation of newly diagnosed rectal cancer.     She was in her usual health and went to PCP for annual screening earlier this year. PCP recommended screening colonoscopy as she is >45. 2 weeks prior to her screening colonoscopy, she noticed hematochezia and rectal pain. Her only symptom prior to this was constipation, which is lifelong. She presents today to start FOLFOX plus avastin per recommendations from TB. S/p cycle 2 of FOLFOX.     Interval History:   MS Segura returns prior to cycle 3. She actually tolerated treatment very well. She had good energy and was eating. Around day 4, however, she began to have increasing fatigue and weakness. She was still able to function well and do things around her home. She had minimal nausea without vomiting. She is feeling better now. She is eating and following with the nutritionist. No constipation or diarrhea. No cold sensitivity or neuropathy. No pain. Overall, doing well. No other concerns or complaints.     Family History: 4 uncles and and aunt with cancer, including melanoma, esophageal, throat, unknown, and RCC.   Social History: . Lifelong nonsmoker. Social alcohol use. Denies illicit drug use.     Patient presents alone.  ECOG PS is 0.  History has been obtained by chart review and discussion with the patient.    ROS:   Review of Systems   Constitutional:  Positive for malaise/fatigue and weight loss.  Negative for fever.   HENT:  Negative for hearing loss.    Eyes:  Negative for blurred vision.   Respiratory:  Negative for cough and hemoptysis.    Cardiovascular:  Negative for chest pain.   Gastrointestinal:  Positive for blood in stool and constipation. Negative for abdominal pain, nausea and vomiting.        Rectal bleed, rectal pain / discomfort    Genitourinary:  Negative for dysuria, frequency and urgency.   Musculoskeletal:  Negative for myalgias.   Skin:  Negative for rash.   Neurological:  Negative for dizziness, tingling, weakness and headaches.   Endo/Heme/Allergies:  Does not bruise/bleed easily.   Psychiatric/Behavioral:  Negative for depression.        Past Medical History:   Past Medical History:   Diagnosis Date    Anxiety disorder, unspecified     Fibroids     HTN (hypertension)     Sinus problem         Past Surgical History:   Past Surgical History:   Procedure Laterality Date    BILATERAL SALPINGO-OOPHORECTOMY (BSO) Bilateral 12/9/2020    Procedure: SALPINGO-OOPHORECTOMY, BILATERAL;  Surgeon: Yun Recio MD;  Location: [facility];  Service: OB/GYN;  Laterality: Bilateral;  COVID NEGATIVE 12/3    INCISION OF UVULA      INSERTION OF TUNNELED CENTRAL VENOUS CATHETER (CVC) WITH SUBCUTANEOUS PORT Right 7/29/2024    Procedure: INSERTION, SINGLE LUMEN CATHETER WITH PORT, WITH FLUOROSCOPIC GUIDANCE;  Surgeon: Russel Layton MD;  Location: [facility] OR [address] [place] [address];  Service: General;  Laterality: Right;    knee arhroscopy      NASAL SEPTUM SURGERY      SINUS SURGERY      TONSILLECTOMY      TOTAL ABDOMINAL HYSTERECTOMY N/A 12/9/2020    Procedure: HYSTERECTOMY, TOTAL, ABDOMINAL;  Surgeon: Yun Recio MD;  Location: [facility];  Service: OB/GYN;  Laterality: N/A;    UTERINE FIBROID SURGERY          Family History:   Family History   Problem Relation Name Age of Onset    Asthma Mother      Hypertension Mother      COPD Father      Diabetes Father      Hypertension Father      Cancer Maternal Uncle       Colon cancer Neg Hx          Social History:   Social History     Tobacco Use    Smoking status: Never    Smokeless tobacco: Never   Substance Use Topics    Alcohol use: Yes     Comment: Socially        I have reviewed and updated the patient's past medical, surgical, family and social histories.    Allergies:   Review of patient's allergies indicates:  No Known Allergies     Medications:   Current Outpatient Medications   Medication Sig Dispense Refill    cetirizine (ZYRTEC) 10 MG tablet Take 10 mg by mouth daily as needed.       cholecalciferol, vitamin D3, 5,000 unit Tab Take 5,000 Units by mouth once daily.      citalopram (CELEXA) 20 MG tablet Take 20 mg by mouth every morning.      EVAMIST 1.53 mg/spray (1.7%) transdermal spray SPRAY 2 SPRAYS TO FOREARM DAILY      fluticasone (FLONASE) 50 mcg/actuation nasal spray 1 spray (50 mcg total) by Each Nare route once daily. 16 g 0    hydroCHLOROthiazide (HYDRODIURIL) 25 MG tablet Take 25 mg by mouth every morning.      LIDOcaine-prilocaine (EMLA) cream Apply topically as needed. 30 g 3    niacin 50 MG Tab niacin      niacin 50 MG Tab       omega 3-dha-epa-fish oil (FISH OIL) 1,000 mg (120 mg-180 mg) Cap Take by mouth once daily.      ondansetron (ZOFRAN-ODT) 8 MG TbDL Take 1 tablet (8 mg total) by mouth every 6 (six) hours as needed (chemotherapy induced nausea/vomiting). 60 tablet 4    promethazine (PHENERGAN) 25 MG tablet Take 1 tablet (25 mg total) by mouth every 6 (six) hours as needed for Nausea (chemotherapy induced nausea/vomiting). 60 tablet 7    rosuvastatin (CRESTOR) 10 MG tablet Take by mouth every evening.       No current facility-administered medications for this visit.        Physical Exam:   [facility] 11/18/2020                Physical Exam  Vitals and nursing note reviewed.   Constitutional:       General: She is not in acute distress.     Appearance: Normal appearance. She is well-developed and normal weight. She is not ill-appearing.   HENT:       Head: Normocephalic and atraumatic.      Right Ear: External ear normal.      Left Ear: External ear normal.      Mouth/Throat:      Mouth: Mucous membranes are moist.      Pharynx: Oropharynx is clear.   Eyes:      General: No scleral icterus.     Extraocular Movements: Extraocular movements intact.      Conjunctiva/sclera: Conjunctivae normal.      Pupils: Pupils are equal, round, and reactive to light.   Cardiovascular:      Rate and Rhythm: Normal rate and regular rhythm.      Heart sounds: No murmur heard.     No friction rub. No gallop.   Pulmonary:      Effort: Pulmonary effort is normal. No respiratory distress.      Breath sounds: Normal breath sounds. No stridor. No wheezing, rhonchi or rales.   Chest:      Chest wall: No tenderness.   Abdominal:      General: Abdomen is flat. Bowel sounds are normal. There is no distension.      Palpations: Abdomen is soft. There is no mass.      Tenderness: There is no abdominal tenderness. There is no guarding or rebound.   Musculoskeletal:         General: No swelling, tenderness or deformity. Normal range of motion.      Cervical back: Normal range of motion and neck supple.      Comments: BLE swelling but no edema    Skin:     General: Skin is warm and dry.      Capillary Refill: Capillary refill takes less than 2 seconds.      Findings: No erythema or rash.   Neurological:      Mental Status: She is alert and oriented to person, place, and time.      Gait: Gait is intact.   Psychiatric:         Behavior: Behavior normal.         Thought Content: Thought content normal.         Judgment: Judgment normal.           Labs:   No results found for this or any previous visit (from the past 48 hour(s)).    Imaging:      PET/CT: 7/19/2024:    Impression:     Hypermetabolic soft tissue thickening along the rectal wall compatible with known malignancy.  There are a few prominent perirectal mesorectal lymph nodes without significant hypermetabolic activity, please note some may  be too small for characterization by PET.     0.9 cm lingular pulmonary nodule with mild hypermetabolic activity and prominent hypermetabolic mediastinal lymph node, concerning for metastatic foci.    CT CAP: 7/17/20204:    Impression:     In this patient with a known history of rectal malignancy, there are few borderline prominent lymph nodes adjacent to the rectum measuring up to 8 mm.  There are solid nodules in both lungs, the largest in the lingula measuring up to 1 cm.  Given the patient's primary malignancy, pulmonary metastatic disease cannot be excluded.  Follow-up based upon oncologic guidance.     Prominent soft tissue density in the anterior superior mediastinum which could possibly represent residual thymic tissue with a focal solid structure measuring up to 1.0 x 1.7 cm, possibly an enlarged lymph node versus other soft tissue mass.     Indeterminate focal regions of sclerosis within the T10 vertebra and right sacrum.  Osseous metastatic disease cannot be excluded.     Cholelithiasis without CT evidence for cholecystitis.     Large hiatal hernia.  Diverticulosis coli without diverticulitis.     Large left UPJ stone measuring up to 1.2 cm without hydronephrosis or hydroureter.        Electronically signed by:Flor Mcgee MD  Date:                                            07/17/2024  Time:                                           10:00    MRI Rectal Cancer Without Contrast    Result Date: 7/15/2024  EXAMINATION: MRI RECTAL CANCER WITHOUT CONTRAST CLINICAL HISTORY: Malignant neoplasm of rectum TECHNIQUE: Multiplanar multi sequence images of the pelvis were obtained per rectal tumor protocol. COMPARISON: None FINDINGS: COLONOSCOPY FINDINGS: N/a MRI FINDINGS: Tumor Location: Lower (0-5 cm) Tumor location (distance from anal verge): Low (0-5.0 cm) cm Distance from top of anal canal (level of puborectalis at anorectal junction) to the inferior edge of the tumor: 0 cm Relationship to anterior peritoneal  reflection: Below Tumor Characteristics: Morphology: Semiannular Circumferential extent/location: 03 - 9 o'clock Tumor Length: 5.6 cm Mucinous: No *MR-T CATEGORY: T1/T2 (tumor confined to rectal wall) LOW RECTAL TUMORS: The lower extent of the tumor is at or below the upper border of the puborectalis sling. The most penetrating component of the tumor invades: internal sphincter only. *MESORECTAL FASCIA (MRF): The distance of the most penetrating component of tumor to the mesorectal fascia is 1-2 mm - no invasion noting this is a low rectal tumor. *LYMPH NODES: Mesorectal/superior rectal lymph nodes): Multiple suspicious mesorectal nodes as follows: -Few superior rectal nodes measuring 0.4 cm short axis with heterogeneous signal intensity and rounded morphology (sequence 2 image 16 and 17) -left mesorectal node measuring 0.7 cm in short axis with heterogeneous signal intensity in rounded morphology (sequence 5 image 34) -2 left mesorectal node measuring 0.5 cm in short axis with heterogeneous signal intensity (sequence 5 image 32, 33). -left mesorectal node measuring 0.5 cm in short axis with irregular borders and heterogeneous signal intensity (sequence 5 image 33). -3 right mesorectal nodes measuring 0.4 cm in short axis with heterogeneous signal intensity and rounded morphology (sequence 5 image 31, 33, 26,). *MR-N CATEGORY: N+ (short axis 5-9 mm AND at least 2 morphologic criteria) Extra-mesorectal nodes (Int. Iliac/Obturator >= 7 mm): No nodes measuring above 7 mm Non-locoregional nodes (Ext. Iliac/Inguinal > 1cm): None *EXTRAMURAL VENOUS INVASION (EMVI): Negative. No tumor extension in the vicinity of any vascular structure. Include the distance and clock face position of the EMVI in relation to the MRF Other: Sigmoid diverticulosis.  Postop changes of hysterectomy.     Low rectal tumor invading the internal sphincter in close approximation to the mesorectal fascia without definite involvement.  Multiple  suspicious mesorectal and superior rectal nodes. *MR STAGE T: T1/T2 (tumor confined to rectal wall) N: N+ (short axis ? 9 mm) Blayne statement: Multiple suspicious mesorectal and superior rectal nodes. *MRF: threatened (tumor margin within 1-2 mm of MRF) noting this is a low rectal tumor.  No definite evidence of invasion. Sphincter Involvement: Yes involvement the internal sphincter Suspicious Extramesorectal Lymph Nodes: No EMVI: No Electronically signed by resident: Gloria Knapp Date:    07/15/2024 Time:    11:14 Electronically signed by: Homer Yanez MD Date:    07/15/2024 Time:    12:48      Path:   Distal rectal mass biopsy 6/21/24: well differentiated adenocarcinoma, negative for LVI and perineural invasion. IHC MSI-H low probability with intact nuclear expressions of MLH1, MSH2, MSH6, and PMS2.         Assessment:       1. Rectal adenocarcinoma    2. Malignant neoplasm metastatic to left lung    3. Microcytic anemia    4. Generalized anxiety disorder    5. Hypertension, unspecified type    6. Hyperlipidemia, unspecified hyperlipidemia type    7. Irritable bowel syndrome with constipation    8. Allergic rhinitis, unspecified seasonality, unspecified trigger          Plan:             # Rectal adenocarcinoma  Imelda Kimble is a 48 y.o. woman with at least Stage IIIB (cT2, cN2b, cM0) distal G1 adenocarcinoma pMMR, 5.6 cm long, below the anterior peritoneal reflection, MRF threatened, with involvement the internal sphincter, EMVI-, with multiple (8+) small suspicious mesorectal and superior rectal nodes.     She was in her usual health and went to PCP for annual screening earlier this year. PCP recommended screening colonoscopy as she is >45. 2 weeks prior to her screening colonoscopy, she noticed hematochezia and rectal pain. Her only symptom prior to this was constipation, which is lifelong. She was seen by Dr. Menendez with CRS and Dr Lopez in Rad Onc for further treatment planning. After discussion,  the following was recommended:   Long-course DAVE > restaging. Will f/u with him 1 month after DAVE with repeat MRI and CT or according to trial protocol. She was also presented to the TB as well, suggesting to, induction chemotherapy, long course chemoradiation and evaluation for SBRT vs lobectomy after re-staging.     Per Dr Lopez as well, long course radiation therapy with Xeloda and 25-30 daily fractions M-F, using IMRT to decrease small bowel and bladder dose. Patient also appears to be candidate for the Jan rectal cancer trial ([facility] [id number]) with DAVE testing FOLFOX with or without Irinotecan  She presents today to start FOLFOX plus avastin per recommendations from TB. Had port placed on 7/29/2024    - Doing well. Did very well with cycle 2. Eating well and has a good appetite. No persistent neuropathy or cold sensitivity.   - I have reviewed the CBC and CMP, adequate for treatment   - Proceed with cycle 3 of FOLFOX  plus avastin at [facility] on Thursday    RTC in 2 weeks. Pt would like to continue treatment [place]    # Microcytic anemia   Suspect MICHELLE.   - Reviewed iron panel and ferritin   - will continue for IV iron    Other chronic disease managed by PCP   # Generalized anxiety disorder - continue citalopram   # HTN - HCTZ   # HLD - statin  # allergic rhinitis - zyrtec and flonase, stable   # IBS-Constipation - Stable    Follow up: 2 weeks for cycle 4    The above information has been reviewed with the patient and all questions have been answered to their apparent satisfaction.  She understands that she can call the clinic with any questions.        VIKASH Guzman, PA-C [facility] [place]  Dept of Hematology/Oncology  PAARELY to GI Oncology team           Route Chart for Scheduling    Med Onc Chart Routing      Follow up with physician . Keep appointments as scheduled.   Follow up with LEIDA    Infusion scheduling note    Injection scheduling note    Labs CBC, CMP and CEA    Scheduling:  Preferred lab:  Lab interval: every 2 weeks     Imaging CT chest abdomen pelvis   Scheduled. thank you   Pharmacy appointment    Other referrals                  Treatment Plan Information   OP GI mFOLFOX6 (oxaliplatin leucovorin fluorouracil) with bevacizumab Q2W Dion Rudolph MD   Associated diagnosis: Rectal adenocarcinoma Stage NAVID cT2, cN2b, cM1a noted on 7/15/2024   Line of treatment: First Line  Treatment Goal: Palliative     Upcoming Treatment Dates - OP GI mFOLFOX6 (oxaliplatin leucovorin fluorouracil) with bevacizumab Q2W    9/10/2024       Chemotherapy       bevacizumab-awwb (MVASI) 5 mg/kg = 550 mg in 0.9% NaCl 100 mL infusion       oxaliplatin (ELOXATIN) 85 mg/m2 = 194 mg in D5W 538.8 mL chemo infusion       fluorouracil (Adrucil) 2,400 mg/m2 = 5,470 mg in 0.9% NaCl 240 mL chemo infusion       Antiemetics       palonosetron (ALOXI) 0.25 mg with Dexamethasone (DECADRON) 12 mg in NS 50 mL IVPB  9/24/2024       Chemotherapy       bevacizumab-awwb (MVASI) 5 mg/kg = 550 mg in 0.9% NaCl 100 mL infusion       oxaliplatin (ELOXATIN) 85 mg/m2 = 194 mg in D5W 538.8 mL chemo infusion       fluorouracil (Adrucil) 2,400 mg/m2 = 5,470 mg in 0.9% NaCl 240 mL chemo infusion       Antiemetics       palonosetron (ALOXI) 0.25 mg with Dexamethasone (DECADRON) 12 mg in NS 50 mL IVPB  10/8/2024       Chemotherapy       bevacizumab-awwb (MVASI) 5 mg/kg = 550 mg in 0.9% NaCl 100 mL infusion       oxaliplatin (ELOXATIN) 85 mg/m2 = 194 mg in D5W 538.8 mL chemo infusion       fluorouracil (Adrucil) 2,400 mg/m2 = 5,470 mg in 0.9% NaCl 240 mL chemo infusion       Antiemetics       palonosetron (ALOXI) 0.25 mg with Dexamethasone (DECADRON) 12 mg in NS 50 mL IVPB  10/22/2024       Chemotherapy       bevacizumab-awwb (MVASI) 5 mg/kg = 550 mg in 0.9% NaCl 100 mL infusion       oxaliplatin (ELOXATIN) 85 mg/m2 = 194 mg in D5W 538.8 mL chemo infusion       fluorouracil (Adrucil) 2,400 mg/m2 = 5,470 mg in 0.9% NaCl  240 mL chemo infusion       Antiemetics       palonosetron (ALOXI) 0.25 mg with Dexamethasone (DECADRON) 12 mg in NS 50 mL IVPB

## 2024-09-12 RX ORDER — HEPARIN 100 UNIT/ML
500 SYRINGE INTRAVENOUS
Status: CANCELLED | OUTPATIENT
Start: 2024-09-12

## 2024-09-12 RX ORDER — EPINEPHRINE 0.3 MG/.3ML
0.3 INJECTION SUBCUTANEOUS ONCE AS NEEDED
Status: CANCELLED | OUTPATIENT
Start: 2024-09-12

## 2024-09-12 RX ORDER — ONDANSETRON 8 MG/1
8 TABLET, ORALLY DISINTEGRATING ORAL EVERY 6 HOURS PRN
Qty: 60 TABLET | Refills: 4 | Status: SHIPPED | OUTPATIENT
Start: 2024-09-12 | End: 2025-09-12

## 2024-09-12 RX ORDER — DIPHENHYDRAMINE HYDROCHLORIDE 50 MG/ML
50 INJECTION INTRAMUSCULAR; INTRAVENOUS ONCE AS NEEDED
Status: CANCELLED | OUTPATIENT
Start: 2024-09-12

## 2024-09-12 RX ORDER — HEPARIN 100 UNIT/ML
500 SYRINGE INTRAVENOUS
Status: CANCELLED | OUTPATIENT
Start: 2024-09-13

## 2024-09-12 RX ORDER — PROCHLORPERAZINE EDISYLATE 5 MG/ML
10 INJECTION INTRAMUSCULAR; INTRAVENOUS ONCE AS NEEDED
Status: CANCELLED | OUTPATIENT
Start: 2024-09-13

## 2024-09-12 RX ORDER — SODIUM CHLORIDE 0.9 % (FLUSH) 0.9 %
10 SYRINGE (ML) INJECTION
Status: CANCELLED | OUTPATIENT
Start: 2024-09-12

## 2024-09-12 RX ORDER — PROCHLORPERAZINE EDISYLATE 5 MG/ML
10 INJECTION INTRAMUSCULAR; INTRAVENOUS ONCE AS NEEDED
Status: CANCELLED | OUTPATIENT
Start: 2024-09-12

## 2024-09-12 RX ORDER — SODIUM CHLORIDE 0.9 % (FLUSH) 0.9 %
10 SYRINGE (ML) INJECTION
Status: CANCELLED | OUTPATIENT
Start: 2024-09-13

## 2024-09-16 DIAGNOSIS — C20 RECTAL ADENOCARCINOMA: ICD-10-CM

## 2024-09-16 RX ORDER — ONDANSETRON 8 MG/1
8 TABLET, ORALLY DISINTEGRATING ORAL EVERY 6 HOURS PRN
Qty: 60 TABLET | Refills: 4 | Status: SHIPPED | OUTPATIENT
Start: 2024-09-16 | End: 2025-09-16

## 2024-09-24 ENCOUNTER — OFFICE VISIT (OUTPATIENT)
Dept: HEMATOLOGY/ONCOLOGY | Facility: CLINIC | Age: 49
End: 2024-09-24
Payer: COMMERCIAL

## 2024-09-24 ENCOUNTER — LAB VISIT (OUTPATIENT)
Dept: LAB | Facility: HOSPITAL | Age: 49
End: 2024-09-24
Attending: INTERNAL MEDICINE
Payer: COMMERCIAL

## 2024-09-24 DIAGNOSIS — I10 HYPERTENSION, UNSPECIFIED TYPE: ICD-10-CM

## 2024-09-24 DIAGNOSIS — F41.1 GENERALIZED ANXIETY DISORDER: ICD-10-CM

## 2024-09-24 DIAGNOSIS — C20 RECTAL ADENOCARCINOMA: ICD-10-CM

## 2024-09-24 DIAGNOSIS — D49.9 IMMUNODEFICIENCY SECONDARY TO NEOPLASM: ICD-10-CM

## 2024-09-24 DIAGNOSIS — Z79.899 IMMUNODEFICIENCY SECONDARY TO CHEMOTHERAPY: ICD-10-CM

## 2024-09-24 DIAGNOSIS — E78.5 HYPERLIPIDEMIA, UNSPECIFIED HYPERLIPIDEMIA TYPE: ICD-10-CM

## 2024-09-24 DIAGNOSIS — C78.02 MALIGNANT NEOPLASM METASTATIC TO LEFT LUNG: ICD-10-CM

## 2024-09-24 DIAGNOSIS — C20 RECTAL ADENOCARCINOMA: Primary | ICD-10-CM

## 2024-09-24 DIAGNOSIS — D84.821 IMMUNODEFICIENCY SECONDARY TO CHEMOTHERAPY: ICD-10-CM

## 2024-09-24 DIAGNOSIS — K58.1 IRRITABLE BOWEL SYNDROME WITH CONSTIPATION: ICD-10-CM

## 2024-09-24 DIAGNOSIS — J30.9 ALLERGIC RHINITIS, UNSPECIFIED SEASONALITY, UNSPECIFIED TRIGGER: ICD-10-CM

## 2024-09-24 DIAGNOSIS — C20 RECTAL CANCER: ICD-10-CM

## 2024-09-24 DIAGNOSIS — T45.1X5A IMMUNODEFICIENCY SECONDARY TO CHEMOTHERAPY: ICD-10-CM

## 2024-09-24 DIAGNOSIS — D84.81 IMMUNODEFICIENCY SECONDARY TO NEOPLASM: ICD-10-CM

## 2024-09-24 DIAGNOSIS — D50.9 MICROCYTIC ANEMIA: ICD-10-CM

## 2024-09-24 LAB
ALBUMIN SERPL BCP-MCNC: 3.7 G/DL (ref 3.5–5.2)
ALP SERPL-CCNC: 97 U/L (ref 55–135)
ALT SERPL W/O P-5'-P-CCNC: 30 U/L (ref 10–44)
ANION GAP SERPL CALC-SCNC: 12 MMOL/L (ref 8–16)
AST SERPL-CCNC: 23 U/L (ref 10–40)
BACTERIA #/AREA URNS HPF: ABNORMAL /HPF
BILIRUB SERPL-MCNC: 0.3 MG/DL (ref 0.1–1)
BILIRUB UR QL STRIP: NEGATIVE
BUN SERPL-MCNC: 15 MG/DL (ref 6–20)
CALCIUM SERPL-MCNC: 9.4 MG/DL (ref 8.7–10.5)
CEA SERPL-MCNC: 2.3 NG/ML (ref 0–5)
CHLORIDE SERPL-SCNC: 103 MMOL/L (ref 95–110)
CLARITY UR: ABNORMAL
CO2 SERPL-SCNC: 25 MMOL/L (ref 23–29)
COLOR UR: YELLOW
CREAT SERPL-MCNC: 0.8 MG/DL (ref 0.5–1.4)
ERYTHROCYTE [DISTWIDTH] IN BLOOD BY AUTOMATED COUNT: 14.1 % (ref 11.5–14.5)
EST. GFR  (NO RACE VARIABLE): >60 ML/MIN/1.73 M^2
GLUCOSE SERPL-MCNC: 117 MG/DL (ref 70–110)
GLUCOSE UR QL STRIP: NEGATIVE
HCT VFR BLD AUTO: 38.7 % (ref 37–48.5)
HGB BLD-MCNC: 13 G/DL (ref 12–16)
HGB UR QL STRIP: ABNORMAL
HYALINE CASTS #/AREA URNS LPF: 0 /LPF
IMM GRANULOCYTES # BLD AUTO: 0.01 K/UL (ref 0–0.04)
KETONES UR QL STRIP: NEGATIVE
LEUKOCYTE ESTERASE UR QL STRIP: ABNORMAL
MCH RBC QN AUTO: 26.6 PG (ref 27–31)
MCHC RBC AUTO-ENTMCNC: 33.6 G/DL (ref 32–36)
MCV RBC AUTO: 79 FL (ref 82–98)
MICROSCOPIC COMMENT: ABNORMAL
NEUTROPHILS # BLD AUTO: 4.1 K/UL (ref 1.8–7.7)
NITRITE UR QL STRIP: POSITIVE
PH UR STRIP: 7 [PH] (ref 5–8)
PLATELET # BLD AUTO: 185 K/UL (ref 150–450)
PMV BLD AUTO: 11.4 FL (ref 9.2–12.9)
POTASSIUM SERPL-SCNC: 3.8 MMOL/L (ref 3.5–5.1)
PROT SERPL-MCNC: 7 G/DL (ref 6–8.4)
PROT UR QL STRIP: NEGATIVE
RBC # BLD AUTO: 4.89 M/UL (ref 4–5.4)
RBC #/AREA URNS HPF: 20 /HPF (ref 0–4)
SODIUM SERPL-SCNC: 140 MMOL/L (ref 136–145)
SP GR UR STRIP: 1.01 (ref 1–1.03)
SQUAMOUS #/AREA URNS HPF: 12 /HPF
URN SPEC COLLECT METH UR: ABNORMAL
UROBILINOGEN UR STRIP-ACNC: NEGATIVE EU/DL
WBC # BLD AUTO: 6.57 K/UL (ref 3.9–12.7)
WBC #/AREA URNS HPF: 19 /HPF (ref 0–5)
WBC CLUMPS URNS QL MICRO: ABNORMAL

## 2024-09-24 PROCEDURE — 1159F MED LIST DOCD IN RCRD: CPT | Mod: CPTII,95,, | Performed by: REGISTERED NURSE

## 2024-09-24 PROCEDURE — 80053 COMPREHEN METABOLIC PANEL: CPT | Performed by: INTERNAL MEDICINE

## 2024-09-24 PROCEDURE — 81000 URINALYSIS NONAUTO W/SCOPE: CPT | Performed by: INTERNAL MEDICINE

## 2024-09-24 PROCEDURE — G2211 COMPLEX E/M VISIT ADD ON: HCPCS | Mod: 95,,, | Performed by: REGISTERED NURSE

## 2024-09-24 PROCEDURE — 85027 COMPLETE CBC AUTOMATED: CPT | Performed by: INTERNAL MEDICINE

## 2024-09-24 PROCEDURE — 82378 CARCINOEMBRYONIC ANTIGEN: CPT | Performed by: INTERNAL MEDICINE

## 2024-09-24 PROCEDURE — 36415 COLL VENOUS BLD VENIPUNCTURE: CPT | Performed by: INTERNAL MEDICINE

## 2024-09-24 PROCEDURE — 99215 OFFICE O/P EST HI 40 MIN: CPT | Mod: 95,,, | Performed by: REGISTERED NURSE

## 2024-09-24 PROCEDURE — 1160F RVW MEDS BY RX/DR IN RCRD: CPT | Mod: CPTII,95,, | Performed by: REGISTERED NURSE

## 2024-09-24 RX ORDER — DIPHENHYDRAMINE HYDROCHLORIDE 50 MG/ML
50 INJECTION INTRAMUSCULAR; INTRAVENOUS ONCE AS NEEDED
Status: CANCELLED | OUTPATIENT
Start: 2024-09-25

## 2024-09-24 RX ORDER — SODIUM CHLORIDE 0.9 % (FLUSH) 0.9 %
10 SYRINGE (ML) INJECTION
Status: CANCELLED | OUTPATIENT
Start: 2024-09-27

## 2024-09-24 RX ORDER — EPINEPHRINE 0.3 MG/.3ML
0.3 INJECTION SUBCUTANEOUS ONCE AS NEEDED
Status: CANCELLED | OUTPATIENT
Start: 2024-09-25

## 2024-09-24 RX ORDER — PROCHLORPERAZINE EDISYLATE 5 MG/ML
10 INJECTION INTRAMUSCULAR; INTRAVENOUS ONCE AS NEEDED
Status: CANCELLED | OUTPATIENT
Start: 2024-09-27

## 2024-09-24 RX ORDER — SODIUM CHLORIDE 0.9 % (FLUSH) 0.9 %
10 SYRINGE (ML) INJECTION
Status: CANCELLED | OUTPATIENT
Start: 2024-09-25

## 2024-09-24 RX ORDER — PROCHLORPERAZINE EDISYLATE 5 MG/ML
10 INJECTION INTRAMUSCULAR; INTRAVENOUS ONCE AS NEEDED
Status: CANCELLED | OUTPATIENT
Start: 2024-09-25

## 2024-09-24 RX ORDER — HEPARIN 100 UNIT/ML
500 SYRINGE INTRAVENOUS
Status: CANCELLED | OUTPATIENT
Start: 2024-09-25

## 2024-09-24 RX ORDER — HEPARIN 100 UNIT/ML
500 SYRINGE INTRAVENOUS
Status: CANCELLED | OUTPATIENT
Start: 2024-09-27

## 2024-09-24 NOTE — PROGRESS NOTES
The patient location is: [facility]  The chief complaint leading to consultation is: treatment follow up, lab review, chemo clearance     Visit type: audiovisual    Each patient to whom he or she provides medical services by telemedicine is:  (1) informed of the relationship between the physician and patient and the respective role of any other health care provider with respect to management of the patient; and (2) notified that he or she may decline to receive medical services by telemedicine and may withdraw from such care at any time.    Notes:     MEDICAL ONCOLOGY - ESTABLISHED PATIENT VISIT    Reason for visit: rectal cancer     Best Contact Phone Number(s): [phone] (home)      Cancer/Stage/TNM:    Cancer Staging   Rectal adenocarcinoma  Staging form: Colon and Rectum, AJCC 8th Edition  - Clinical stage from 6/21/2024: Stage NAVID (cT2, cN2b, cM1a) - Signed by Dion Rudolph MD on 7/24/2024  - Pathologic: No stage assigned - Unsigned       Oncology History   Rectal adenocarcinoma   6/21/2024 Procedure    Colonoscopy - mild diverticular disease, mass in distal rectum (Complete)     6/21/2024 Cancer Staged    Staging form: Colon and Rectum, AJCC 8th Edition  - Clinical stage from 6/21/2024: Stage NAVID (cT2, cN2b, cM1a)     6/24/2024 Initial Diagnosis    Rectal adenocarcinoma  ERROL  - Colonoscopy 6/21/24: infiltrative, ulcerated, sub-mucosal, fungating and villous bleeding mass found in distal rectum, 6-7cm from the anus proximally.   - Distal rectal mass biopsy 6/21/24: well differentiated adenocarcinoma, negative for LVI and perineural invasion. IHC MSI-H low probability with intact nuclear expressions of MLH1, MSH2, MSH6, and PMS2.        7/15/2024 Imaging Significant Findings    MRI Rectal cancer  Low rectal tumor invading the internal sphincter in close approximation to the mesorectal fascia without definite involvement.  Multiple suspicious mesorectal and superior rectal nodes.     *MR STAGE      T: T1/T2 (tumor confined to rectal wall)     N: N+ (short axis ? 9 mm)     Blayne statement: Multiple suspicious mesorectal and superior rectal nodes.     *MRF: threatened (tumor margin within 1-2 mm of MRF) noting this is a low rectal tumor.  No definite evidence of invasion.     Sphincter Involvement: Yes involvement the internal sphincter     Suspicious Extramesorectal Lymph Nodes: No     EMVI: No     7/16/2024 Tumor Markers    Patient's tumor was tested for the following markers: CEA                                              Results of the tumor marker test revealed 2.9     7/17/2024 Imaging Significant Findings    CT CAP  In this patient with a known history of rectal malignancy, there are few borderline prominent lymph nodes adjacent to the rectum measuring up to 8 mm.  There are solid nodules in both lungs, the largest in the lingula measuring up to 1 cm.  Given the patient's primary malignancy, pulmonary metastatic disease cannot be excluded.  Follow-up based upon oncologic guidance.     Prominent soft tissue density in the anterior superior mediastinum which could possibly represent residual thymic tissue with a focal solid structure measuring up to 1.0 x 1.7 cm, possibly an enlarged lymph node versus other soft tissue mass.     Indeterminate focal regions of sclerosis within the T10 vertebra and right sacrum.  Osseous metastatic disease cannot be excluded.     Cholelithiasis without CT evidence for cholecystitis.     Large hiatal hernia.  Diverticulosis coli without diverticulitis.     Large left UPJ stone measuring up to 1.2 cm without hydronephrosis or hydroureter.     7/19/2024 Imaging Significant Findings    PET CT  Hypermetabolic soft tissue thickening along the rectal wall compatible with known malignancy.  There are a few prominent perirectal mesorectal lymph nodes without significant hypermetabolic activity, please note some may be too small for characterization by PET.     0.9 cm lingular  pulmonary nodule with mild hypermetabolic activity and prominent hypermetabolic mediastinal lymph node, concerning for metastatic foci.     8/12/2024 -  Chemotherapy    Treatment Summary   Plan Name: OP GI mFOLFOX6 (oxaliplatin leucovorin fluorouracil) with bevacizumab Q2W  Treatment Goal: Palliative  Status: Active  Start Date: 8/12/2024  End Date: 7/4/2025 (Planned)  Provider: Dion Rudolph MD  Chemotherapy: oxaliplatin (ELOXATIN) 85 mg/m2 = 194 mg in D5W 603.8 mL chemo infusion, 85 mg/m2 = 194 mg, Intravenous, Clinic/HOD 1 time, 3 of 24 cycles  Administration: 194 mg (8/12/2024), 194 mg (8/28/2024), 194 mg (9/12/2024)  fluorouracil (Adrucil) 2,400 mg/m2 = 5,470 mg in 0.9% NaCl 240 mL chemo infusion, 2,400 mg/m2 = 5,470 mg, Intravenous, Clinic/HOD 1 time, 3 of 24 cycles  Administration: 5,470 mg (8/12/2024), 5,470 mg (8/28/2024), 5,470 mg (9/12/2024)  bevacizumab-awwb (MVASI) 500 mg in 0.9% NaCl 100 mL infusion, 550 mg, Intravenous, Clinic/HOD 1 time, 3 of 24 cycles  Administration: 500 mg (8/12/2024), 500 mg (8/28/2024), 500 mg (9/12/2024)        Oncological History copied from medical chart:    48 y.o. female with HTN, HLD, ANANT, allergic rhinitis who presents for further evaluation of newly diagnosed rectal cancer.     She was in her usual health and went to PCP for annual screening earlier this year. PCP recommended screening colonoscopy as she is >45. 2 weeks prior to her screening colonoscopy, she noticed hematochezia and rectal pain. Her only symptom prior to this was constipation, which is lifelong. She presents to continue FOLFOX plus avastin per recommendations from TB. S/p cycle 3 of FOLFOX.     Interval History:   Ms. Segura returns prior to cycle 4. She is doing well overall. She is feeling fatigued in general. Staying as active as she can be and eating as tolerated. Has cold sensitivity that is lasting nearly the entire 2 weeks. No persistent paresthesias. Has intermittent lower abdominal pain.  No changes in bowel movements. Had mild nausea the weekend after chemo, but otherwise no significant nausea. Hoping to be done with chemo after cycle 5/scans.     Patient presents to virtual visit alone.  ECOG PS is 0.    ROS:   Review of Systems   Constitutional:  Positive for malaise/fatigue and weight loss. Negative for fever.   HENT:  Negative for hearing loss.    Eyes:  Negative for blurred vision.   Respiratory:  Negative for cough and hemoptysis.    Cardiovascular:  Negative for chest pain.   Gastrointestinal:  Positive for blood in stool and constipation. Negative for abdominal pain, nausea and vomiting.        Rectal bleed, rectal pain / discomfort    Genitourinary:  Negative for dysuria, frequency and urgency.   Musculoskeletal:  Negative for myalgias.   Skin:  Negative for rash.   Neurological:  Negative for dizziness, tingling, weakness and headaches.   Endo/Heme/Allergies:  Does not bruise/bleed easily.   Psychiatric/Behavioral:  Negative for depression.        Past Medical History:   Past Medical History:   Diagnosis Date    Anxiety disorder, unspecified     Fibroids     HTN (hypertension)     Sinus problem         Past Surgical History:   Past Surgical History:   Procedure Laterality Date    BILATERAL SALPINGO-OOPHORECTOMY (BSO) Bilateral 12/9/2020    Procedure: SALPINGO-OOPHORECTOMY, BILATERAL;  Surgeon: Yun Recio MD;  Location: [facility];  Service: OB/GYN;  Laterality: Bilateral;  COVID NEGATIVE 12/3    INCISION OF UVULA      INSERTION OF TUNNELED CENTRAL VENOUS CATHETER (CVC) WITH SUBCUTANEOUS PORT Right 7/29/2024    Procedure: INSERTION, SINGLE LUMEN CATHETER WITH PORT, WITH FLUOROSCOPIC GUIDANCE;  Surgeon: Russel Layton MD;  Location: [address];  Service: General;  Laterality: Right;    knee arhroscopy      NASAL SEPTUM SURGERY      SINUS SURGERY      TONSILLECTOMY      TOTAL ABDOMINAL HYSTERECTOMY N/A 12/9/2020    Procedure: HYSTERECTOMY, TOTAL, ABDOMINAL;  Surgeon: Yun  KING Recio MD;  Location: [facility];  Service: OB/GYN;  Laterality: N/A;    UTERINE FIBROID SURGERY          Family History:   Family History   Problem Relation Name Age of Onset    Asthma Mother      Hypertension Mother      COPD Father      Diabetes Father      Hypertension Father      Cancer Maternal Uncle      Colon cancer Neg Hx          Social History:   Social History     Tobacco Use    Smoking status: Never    Smokeless tobacco: Never   Substance Use Topics    Alcohol use: Yes     Comment: Socially        I have reviewed and updated the patient's past medical, surgical, family and social histories.    Allergies:   Review of patient's allergies indicates:  No Known Allergies     Medications:   Current Outpatient Medications   Medication Sig Dispense Refill    cetirizine (ZYRTEC) 10 MG tablet Take 10 mg by mouth daily as needed.       cholecalciferol, vitamin D3, 5,000 unit Tab Take 5,000 Units by mouth once daily.      citalopram (CELEXA) 20 MG tablet Take 20 mg by mouth every morning.      EVAMIST 1.53 mg/spray (1.7%) transdermal spray SPRAY 2 SPRAYS TO FOREARM DAILY      fluticasone (FLONASE) 50 mcg/actuation nasal spray 1 spray (50 mcg total) by Each Nare route once daily. (Patient not taking: Reported on 9/10/2024) 16 g 0    hydroCHLOROthiazide (HYDRODIURIL) 25 MG tablet Take 25 mg by mouth every morning.      LIDOcaine-prilocaine (EMLA) cream Apply topically as needed. 30 g 3    niacin 50 MG Tab niacin      niacin 50 MG Tab       omega 3-dha-epa-fish oil (FISH OIL) 1,000 mg (120 mg-180 mg) Cap Take by mouth once daily.      ondansetron (ZOFRAN-ODT) 8 MG TbDL Take 1 tablet (8 mg total) by mouth every 6 (six) hours as needed (chemotherapy induced nausea/vomiting). 60 tablet 4    rosuvastatin (CRESTOR) 10 MG tablet Take by mouth every evening.       No current facility-administered medications for this visit.      Physical Exam:   LMP 11/18/2020          Physical Exam  Constitutional:       General: She  is not in acute distress.     Appearance: Normal appearance. She is well-developed and normal weight. She is not ill-appearing, toxic-appearing or diaphoretic.      Comments: PE limited d/t nature of virtual visit   HENT:      Head: Normocephalic and atraumatic.      Right Ear: External ear normal.      Left Ear: External ear normal.   Eyes:      General: No scleral icterus.     Conjunctiva/sclera: Conjunctivae normal.   Pulmonary:      Effort: Pulmonary effort is normal. No respiratory distress.   Skin:     Capillary Refill: Capillary refill takes less than 2 seconds.   Neurological:      Mental Status: She is alert and oriented to person, place, and time.      Gait: Gait is intact.   Psychiatric:         Mood and Affect: Mood normal.         Behavior: Behavior normal.         Labs:   Recent Results (from the past 48 hour(s))   CBC Oncology    Collection Time: 09/24/24  9:30 AM   Result Value Ref Range    WBC 6.57 3.90 - 12.70 K/uL    RBC 4.89 4.00 - 5.40 M/uL    Hemoglobin 13.0 12.0 - 16.0 g/dL    Hematocrit 38.7 37.0 - 48.5 %    MCV 79 (L) 82 - 98 fL    MCH 26.6 (L) 27.0 - 31.0 pg    MCHC 33.6 32.0 - 36.0 g/dL    RDW 14.1 11.5 - 14.5 %    Platelets 185 150 - 450 K/uL    MPV 11.4 9.2 - 12.9 fL    Gran # (ANC) 4.1 1.8 - 7.7 K/uL    Immature Grans (Abs) 0.01 0.00 - 0.04 K/uL   Comprehensive Metabolic Panel    Collection Time: 09/24/24  9:30 AM   Result Value Ref Range    Sodium 140 136 - 145 mmol/L    Potassium 3.8 3.5 - 5.1 mmol/L    Chloride 103 95 - 110 mmol/L    CO2 25 23 - 29 mmol/L    Glucose 117 (H) 70 - 110 mg/dL    BUN 15 6 - 20 mg/dL    Creatinine 0.8 0.5 - 1.4 mg/dL    Calcium 9.4 8.7 - 10.5 mg/dL    Total Protein 7.0 6.0 - 8.4 g/dL    Albumin 3.7 3.5 - 5.2 g/dL    Total Bilirubin 0.3 0.1 - 1.0 mg/dL    Alkaline Phosphatase 97 55 - 135 U/L    AST 23 10 - 40 U/L    ALT 30 10 - 44 U/L    eGFR >60 >60 mL/min/1.73 m^2    Anion Gap 12 8 - 16 mmol/L   Urinalysis    Collection Time: 09/24/24  9:35 AM   Result  Value Ref Range    Specimen UA Urine, Clean Catch     Color, UA Yellow Yellow, Straw, Norma    Appearance, UA Hazy (A) Clear    pH, UA 7.0 5.0 - 8.0    Specific Gravity, UA 1.015 1.005 - 1.030    Protein, UA Negative Negative    Glucose, UA Negative Negative    Ketones, UA Negative Negative    Bilirubin (UA) Negative Negative    Occult Blood UA 3+ (A) Negative    Nitrite, UA Positive (A) Negative    Urobilinogen, UA Negative <2.0 EU/dL    Leukocytes, UA 1+ (A) Negative   Urinalysis Microscopic    Collection Time: 09/24/24  9:35 AM   Result Value Ref Range    RBC, UA 20 (H) 0 - 4 /hpf    WBC, UA 19 (H) 0 - 5 /hpf    WBC Clumps, UA Occasional (A) None-Rare    Bacteria Moderate (A) None-Occ /hpf    Squam Epithel, UA 12 /hpf    Hyaline Casts, UA 0 0-1/lpf /lpf    Microscopic Comment SEE COMMENT      Imaging:      PET/CT: 7/19/2024:    Impression:     Hypermetabolic soft tissue thickening along the rectal wall compatible with known malignancy.  There are a few prominent perirectal mesorectal lymph nodes without significant hypermetabolic activity, please note some may be too small for characterization by PET.     0.9 cm lingular pulmonary nodule with mild hypermetabolic activity and prominent hypermetabolic mediastinal lymph node, concerning for metastatic foci.    CT CAP: 7/17/20204:    Impression:     In this patient with a known history of rectal malignancy, there are few borderline prominent lymph nodes adjacent to the rectum measuring up to 8 mm.  There are solid nodules in both lungs, the largest in the lingula measuring up to 1 cm.  Given the patient's primary malignancy, pulmonary metastatic disease cannot be excluded.  Follow-up based upon oncologic guidance.     Prominent soft tissue density in the anterior superior mediastinum which could possibly represent residual thymic tissue with a focal solid structure measuring up to 1.0 x 1.7 cm, possibly an enlarged lymph node versus other soft tissue mass.      Indeterminate focal regions of sclerosis within the T10 vertebra and right sacrum.  Osseous metastatic disease cannot be excluded.     Cholelithiasis without CT evidence for cholecystitis.     Large hiatal hernia.  Diverticulosis coli without diverticulitis.     Large left UPJ stone measuring up to 1.2 cm without hydronephrosis or hydroureter.        Electronically signed by:Flor Mcgee MD  Date:                                            07/17/2024  Time:                                           10:00    MRI Rectal Cancer Without Contrast    Result Date: 7/15/2024  EXAMINATION: MRI RECTAL CANCER WITHOUT CONTRAST CLINICAL HISTORY: Malignant neoplasm of rectum TECHNIQUE: Multiplanar multi sequence images of the pelvis were obtained per rectal tumor protocol. COMPARISON: None FINDINGS: COLONOSCOPY FINDINGS: N/a MRI FINDINGS: Tumor Location: Lower (0-5 cm) Tumor location (distance from anal verge): Low (0-5.0 cm) cm Distance from top of anal canal (level of puborectalis at anorectal junction) to the inferior edge of the tumor: 0 cm Relationship to anterior peritoneal reflection: Below Tumor Characteristics: Morphology: Semiannular Circumferential extent/location: 03 - 9 o'clock Tumor Length: 5.6 cm Mucinous: No *MR-T CATEGORY: T1/T2 (tumor confined to rectal wall) LOW RECTAL TUMORS: The lower extent of the tumor is at or below the upper border of the puborectalis sling. The most penetrating component of the tumor invades: internal sphincter only. *MESORECTAL FASCIA (MRF): The distance of the most penetrating component of tumor to the mesorectal fascia is 1-2 mm - no invasion noting this is a low rectal tumor. *LYMPH NODES: Mesorectal/superior rectal lymph nodes): Multiple suspicious mesorectal nodes as follows: -Few superior rectal nodes measuring 0.4 cm short axis with heterogeneous signal intensity and rounded morphology (sequence 2 image 16 and 17) -left mesorectal node measuring 0.7 cm in short axis with  heterogeneous signal intensity in rounded morphology (sequence 5 image 34) -2 left mesorectal node measuring 0.5 cm in short axis with heterogeneous signal intensity (sequence 5 image 32, 33). -left mesorectal node measuring 0.5 cm in short axis with irregular borders and heterogeneous signal intensity (sequence 5 image 33). -3 right mesorectal nodes measuring 0.4 cm in short axis with heterogeneous signal intensity and rounded morphology (sequence 5 image 31, 33, 26,). *MR-N CATEGORY: N+ (short axis 5-9 mm AND at least 2 morphologic criteria) Extra-mesorectal nodes (Int. Iliac/Obturator >= 7 mm): No nodes measuring above 7 mm Non-locoregional nodes (Ext. Iliac/Inguinal > 1cm): None *EXTRAMURAL VENOUS INVASION (EMVI): Negative. No tumor extension in the vicinity of any vascular structure. Include the distance and clock face position of the EMVI in relation to the MRF Other: Sigmoid diverticulosis.  Postop changes of hysterectomy.     Low rectal tumor invading the internal sphincter in close approximation to the mesorectal fascia without definite involvement.  Multiple suspicious mesorectal and superior rectal nodes. *MR STAGE T: T1/T2 (tumor confined to rectal wall) N: N+ (short axis ? 9 mm) Blayne statement: Multiple suspicious mesorectal and superior rectal nodes. *MRF: threatened (tumor margin within 1-2 mm of MRF) noting this is a low rectal tumor.  No definite evidence of invasion. Sphincter Involvement: Yes involvement the internal sphincter Suspicious Extramesorectal Lymph Nodes: No EMVI: No     Path:   Distal rectal mass biopsy 6/21/24: well differentiated adenocarcinoma, negative for LVI and perineural invasion. IHC MSI-H low probability with intact nuclear expressions of MLH1, MSH2, MSH6, and PMS2.         Assessment:       1. Rectal adenocarcinoma    2. Malignant neoplasm metastatic to left lung    3. 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 Microcytic  anemia    6. Generalized anxiety disorder    7. Hypertension, unspecified type    8. Hyperlipidemia, unspecified hyperlipidemia type    9. Irritable bowel syndrome with constipation    10. Allergic rhinitis, unspecified seasonality, unspecified trigger       Plan:         # Rectal adenocarcinoma  Imelda Kimble is a 48 y.o. woman with at least Stage IIIB (cT2, cN2b, cM0) distal G1 adenocarcinoma pMMR, 5.6 cm long, below the anterior peritoneal reflection, MRF threatened, with involvement the internal sphincter, EMVI-, with multiple (8+) small suspicious mesorectal and superior rectal nodes.     She was in her usual health and went to PCP for annual screening earlier this year. PCP recommended screening colonoscopy as she is >45. 2 weeks prior to her screening colonoscopy, she noticed hematochezia and rectal pain. Her only symptom prior to this was constipation, which is lifelong. She was seen by Dr. Menendez with CRS and Dr Lopez in Rad Onc for further treatment planning. After discussion, the following was recommended:   Long-course DAVE > restaging. Will f/u with him 1 month after DAVE with repeat MRI and CT or according to trial protocol. She was also presented to the TB as well, suggesting to, induction chemotherapy, long course chemoradiation and evaluation for SBRT vs lobectomy after re-staging.     Per Dr Lopez as well, long course radiation therapy with Xeloda and 25-30 daily fractions M-F, using IMRT to decrease small bowel and bladder dose. Patient also appears to be candidate for the [facility] rectal cancer trial ([place] [id number]) with DAVE testing FOLFOX with or without Irinotecan  Had port placed on 7/29/2024    Received cycle 1 FOLFOX + Avastin on 8/12/24.  Labs reviewed and adequate for treatment   Proceed with cycle 4 of FOLFOX + cherelle at [facility] tomorrow    RTC in 2 weeks. Pt would like to continue treatment [employer].     # Microcytic anemia   Suspect MICHELLE.   - Reviewed iron panel and  ferritin   - will continue for IV iron    Other chronic disease managed by PCP   # Generalized anxiety disorder - continue citalopram   # HTN - HCTZ   # HLD - statin  # allergic rhinitis - zyrtec and flonase, stable   # IBS-Constipation - Stable    Follow up: 2 weeks for cycle 5    Patient is in agreement with the proposed treatment plan. All questions were answered to the patient's satisfaction. Pt knows to call clinic if anything is needed before the next clinic visit.    Patient discussed with collaborating physician, Dr. Rudolph.    At least 40 minutes were spent today on this encounter including face to face time with the patient, data gathering/interpretation and documentation.       lEiane Pozo, MSN, APRN, ACCNS-  Hematology and Medical Oncology  Clinical Nurse Specialist to Dr. Rudolph, Dr. Vázquez & Dr. Holly    Route Chart for Scheduling    Med Onc Chart Routing      Follow up with physician 4 weeks. as scheduled   Follow up with LEIDA 2 weeks. as scheduled   Infusion scheduling note    Injection scheduling note    Labs    Imaging    Pharmacy appointment    Other referrals              Treatment Plan Information   OP GI mFOLFOX6 (oxaliplatin leucovorin fluorouracil) with bevacizumab Q2W Dion Rudolph MD   Associated diagnosis: Rectal adenocarcinoma Stage NAVID cT2, cN2b, cM1a noted on 7/15/2024   Line of treatment: First Line  Treatment Goal: Palliative     Upcoming Treatment Dates - OP GI mFOLFOX6 (oxaliplatin leucovorin fluorouracil) with bevacizumab Q2W    9/25/2024       Chemotherapy       bevacizumab-awwb (MVASI) 5 mg/kg = 550 mg in 0.9% NaCl 100 mL infusion       oxaliplatin (ELOXATIN) 85 mg/m2 = 194 mg in D5W 538.8 mL chemo infusion       fluorouracil (Adrucil) 2,400 mg/m2 = 5,470 mg in 0.9% NaCl 240 mL chemo infusion       Antiemetics       palonosetron (ALOXI) 0.25 mg with Dexamethasone (DECADRON) 12 mg in NS 50 mL IVPB  10/9/2024       Chemotherapy       bevacizumab-awwb (MVASI) 5 mg/kg =  550 mg in 0.9% NaCl 100 mL infusion       oxaliplatin (ELOXATIN) 85 mg/m2 = 194 mg in D5W 538.8 mL chemo infusion       fluorouracil (Adrucil) 2,400 mg/m2 = 5,470 mg in 0.9% NaCl 240 mL chemo infusion       Antiemetics       palonosetron (ALOXI) 0.25 mg with Dexamethasone (DECADRON) 12 mg in NS 50 mL IVPB  10/23/2024       Chemotherapy       bevacizumab-awwb (MVASI) 5 mg/kg = 550 mg in 0.9% NaCl 100 mL infusion       oxaliplatin (ELOXATIN) 85 mg/m2 = 194 mg in D5W 538.8 mL chemo infusion       fluorouracil (Adrucil) 2,400 mg/m2 = 5,470 mg in 0.9% NaCl 240 mL chemo infusion       Antiemetics       palonosetron (ALOXI) 0.25 mg with Dexamethasone (DECADRON) 12 mg in NS 50 mL IVPB  11/6/2024       Chemotherapy       bevacizumab-awwb (MVASI) 5 mg/kg = 550 mg in 0.9% NaCl 100 mL infusion       oxaliplatin (ELOXATIN) 85 mg/m2 = 194 mg in D5W 538.8 mL chemo infusion       fluorouracil (Adrucil) 2,400 mg/m2 = 5,470 mg in 0.9% NaCl 240 mL chemo infusion       Antiemetics       palonosetron (ALOXI) 0.25 mg with Dexamethasone (DECADRON) 12 mg in NS 50 mL IVPB

## 2024-09-26 ENCOUNTER — PATIENT MESSAGE (OUTPATIENT)
Dept: HEMATOLOGY/ONCOLOGY | Facility: CLINIC | Age: 49
End: 2024-09-26
Payer: COMMERCIAL

## 2024-10-01 ENCOUNTER — PATIENT MESSAGE (OUTPATIENT)
Dept: HEMATOLOGY/ONCOLOGY | Facility: CLINIC | Age: 49
End: 2024-10-01
Payer: COMMERCIAL

## 2024-10-21 ENCOUNTER — HOSPITAL ENCOUNTER (OUTPATIENT)
Dept: RADIOLOGY | Facility: HOSPITAL | Age: 49
Discharge: HOME | End: 2024-10-21
Attending: PHYSICIAN ASSISTANT
Payer: COMMERCIAL

## 2024-10-21 DIAGNOSIS — C20 RECTAL ADENOCARCINOMA: ICD-10-CM

## 2024-10-21 PROCEDURE — 74177 CT ABD & PELVIS W/CONTRAST: CPT | Mod: TC

## 2024-10-21 PROCEDURE — 71260 CT THORAX DX C+: CPT | Mod: 26,,, | Performed by: RADIOLOGY

## 2024-10-21 PROCEDURE — 74177 CT ABD & PELVIS W/CONTRAST: CPT | Mod: 26,,, | Performed by: RADIOLOGY

## 2024-10-21 PROCEDURE — 71260 CT THORAX DX C+: CPT | Mod: TC

## 2024-10-21 PROCEDURE — 25500020 PHARM REV CODE 255: Performed by: PHYSICIAN ASSISTANT

## 2024-10-21 RX ADMIN — IOHEXOL 30 ML: 350 INJECTION, SOLUTION INTRAVENOUS at 09:10

## 2024-10-21 RX ADMIN — IOHEXOL 100 ML: 350 INJECTION, SOLUTION INTRAVENOUS at 09:10

## 2024-10-23 ENCOUNTER — OFFICE VISIT (OUTPATIENT)
Dept: HEMATOLOGY/ONCOLOGY | Facility: CLINIC | Age: 49
End: 2024-10-23
Payer: COMMERCIAL

## 2024-10-23 ENCOUNTER — TELEPHONE (OUTPATIENT)
Dept: HEMATOLOGY/ONCOLOGY | Facility: CLINIC | Age: 49
End: 2024-10-23
Payer: COMMERCIAL

## 2024-10-23 DIAGNOSIS — F41.1 GENERALIZED ANXIETY DISORDER: ICD-10-CM

## 2024-10-23 DIAGNOSIS — Z79.899 IMMUNODEFICIENCY SECONDARY TO CHEMOTHERAPY: ICD-10-CM

## 2024-10-23 DIAGNOSIS — C20 RECTAL ADENOCARCINOMA: Primary | ICD-10-CM

## 2024-10-23 DIAGNOSIS — D50.9 MICROCYTIC ANEMIA: ICD-10-CM

## 2024-10-23 DIAGNOSIS — C78.02 MALIGNANT NEOPLASM METASTATIC TO LEFT LUNG: ICD-10-CM

## 2024-10-23 DIAGNOSIS — D84.821 IMMUNODEFICIENCY SECONDARY TO CHEMOTHERAPY: ICD-10-CM

## 2024-10-23 DIAGNOSIS — D49.9 IMMUNODEFICIENCY SECONDARY TO NEOPLASM: ICD-10-CM

## 2024-10-23 DIAGNOSIS — T45.1X5A IMMUNODEFICIENCY SECONDARY TO CHEMOTHERAPY: ICD-10-CM

## 2024-10-23 DIAGNOSIS — E78.5 HYPERLIPIDEMIA, UNSPECIFIED HYPERLIPIDEMIA TYPE: ICD-10-CM

## 2024-10-23 DIAGNOSIS — K58.1 IRRITABLE BOWEL SYNDROME WITH CONSTIPATION: ICD-10-CM

## 2024-10-23 DIAGNOSIS — D84.81 IMMUNODEFICIENCY SECONDARY TO NEOPLASM: ICD-10-CM

## 2024-10-23 DIAGNOSIS — J30.9 ALLERGIC RHINITIS, UNSPECIFIED SEASONALITY, UNSPECIFIED TRIGGER: ICD-10-CM

## 2024-10-23 DIAGNOSIS — I10 HYPERTENSION, UNSPECIFIED TYPE: ICD-10-CM

## 2024-10-23 PROCEDURE — G2211 COMPLEX E/M VISIT ADD ON: HCPCS | Mod: 95,,, | Performed by: INTERNAL MEDICINE

## 2024-10-23 PROCEDURE — 99215 OFFICE O/P EST HI 40 MIN: CPT | Mod: 95,,, | Performed by: INTERNAL MEDICINE

## 2024-10-23 RX ORDER — HEPARIN 100 UNIT/ML
500 SYRINGE INTRAVENOUS
Status: CANCELLED | OUTPATIENT
Start: 2024-10-23

## 2024-10-23 RX ORDER — SODIUM CHLORIDE 0.9 % (FLUSH) 0.9 %
10 SYRINGE (ML) INJECTION
Status: CANCELLED | OUTPATIENT
Start: 2024-10-24

## 2024-10-23 RX ORDER — DIPHENHYDRAMINE HYDROCHLORIDE 50 MG/ML
50 INJECTION INTRAMUSCULAR; INTRAVENOUS ONCE AS NEEDED
Status: CANCELLED | OUTPATIENT
Start: 2024-10-23

## 2024-10-23 RX ORDER — PROCHLORPERAZINE EDISYLATE 5 MG/ML
10 INJECTION INTRAMUSCULAR; INTRAVENOUS ONCE AS NEEDED
Status: CANCELLED | OUTPATIENT
Start: 2024-10-23

## 2024-10-23 RX ORDER — EPINEPHRINE 0.3 MG/.3ML
0.3 INJECTION SUBCUTANEOUS ONCE AS NEEDED
Status: CANCELLED | OUTPATIENT
Start: 2024-10-23

## 2024-10-23 RX ORDER — LORAZEPAM 0.5 MG/1
0.5 TABLET ORAL EVERY 12 HOURS PRN
Qty: 5 TABLET | Refills: 0 | Status: SHIPPED | OUTPATIENT
Start: 2024-10-23 | End: 2025-10-23

## 2024-10-23 RX ORDER — SODIUM CHLORIDE 0.9 % (FLUSH) 0.9 %
10 SYRINGE (ML) INJECTION
Status: CANCELLED | OUTPATIENT
Start: 2024-10-23

## 2024-10-23 RX ORDER — HEPARIN 100 UNIT/ML
500 SYRINGE INTRAVENOUS
Status: CANCELLED | OUTPATIENT
Start: 2024-10-24

## 2024-10-23 RX ORDER — PROCHLORPERAZINE EDISYLATE 5 MG/ML
10 INJECTION INTRAMUSCULAR; INTRAVENOUS ONCE AS NEEDED
Status: CANCELLED | OUTPATIENT
Start: 2024-10-24

## 2024-10-23 NOTE — PROGRESS NOTES
The patient location is: [facility]  The chief complaint leading to consultation is: treatment follow up, lab review, chemo clearance     Visit type: audiovisual    Each patient to whom he or she provides medical services by telemedicine is:  (1) informed of the relationship between the physician and patient and the respective role of any other health care provider with respect to management of the patient; and (2) notified that he or she may decline to receive medical services by telemedicine and may withdraw from such care at any time.    Notes:     MEDICAL ONCOLOGY - ESTABLISHED PATIENT VISIT    Reason for visit: rectal cancer     Best Contact Phone Number(s): [phone] (home)      Cancer/Stage/TNM:    Cancer Staging   Rectal adenocarcinoma  Staging form: Colon and Rectum, AJCC 8th Edition  - Clinical stage from 6/21/2024: Stage NAVID (cT2, cN2b, cM1a) - Signed by Dion Rudolph MD on 7/24/2024  - Pathologic: No stage assigned - Unsigned       Oncology History   Rectal adenocarcinoma   6/21/2024 Procedure    Colonoscopy - mild diverticular disease, mass in distal rectum (Complete)     6/21/2024 Cancer Staged    Staging form: Colon and Rectum, AJCC 8th Edition  - Clinical stage from 6/21/2024: Stage NAVID (cT2, cN2b, cM1a)     6/24/2024 Initial Diagnosis    Rectal adenocarcinoma  ERROL  - Colonoscopy 6/21/24: infiltrative, ulcerated, sub-mucosal, fungating and villous bleeding mass found in distal rectum, 6-7cm from the anus proximally.   - Distal rectal mass biopsy 6/21/24: well differentiated adenocarcinoma, negative for LVI and perineural invasion. IHC MSI-H low probability with intact nuclear expressions of MLH1, MSH2, MSH6, and PMS2.        7/15/2024 Imaging Significant Findings    MRI Rectal cancer  Low rectal tumor invading the internal sphincter in close approximation to the mesorectal fascia without definite involvement.  Multiple suspicious mesorectal and superior rectal nodes.     *MR STAGE      T: T1/T2 (tumor confined to rectal wall)     N: N+ (short axis ? 9 mm)     Blayne statement: Multiple suspicious mesorectal and superior rectal nodes.     *MRF: threatened (tumor margin within 1-2 mm of MRF) noting this is a low rectal tumor.  No definite evidence of invasion.     Sphincter Involvement: Yes involvement the internal sphincter     Suspicious Extramesorectal Lymph Nodes: No     EMVI: No     7/16/2024 Tumor Markers    Patient's tumor was tested for the following markers: CEA                                              Results of the tumor marker test revealed 2.9     7/17/2024 Imaging Significant Findings    CT CAP  In this patient with a known history of rectal malignancy, there are few borderline prominent lymph nodes adjacent to the rectum measuring up to 8 mm.  There are solid nodules in both lungs, the largest in the lingula measuring up to 1 cm.  Given the patient's primary malignancy, pulmonary metastatic disease cannot be excluded.  Follow-up based upon oncologic guidance.     Prominent soft tissue density in the anterior superior mediastinum which could possibly represent residual thymic tissue with a focal solid structure measuring up to 1.0 x 1.7 cm, possibly an enlarged lymph node versus other soft tissue mass.     Indeterminate focal regions of sclerosis within the T10 vertebra and right sacrum.  Osseous metastatic disease cannot be excluded.     Cholelithiasis without CT evidence for cholecystitis.     Large hiatal hernia.  Diverticulosis coli without diverticulitis.     Large left UPJ stone measuring up to 1.2 cm without hydronephrosis or hydroureter.     7/19/2024 Imaging Significant Findings    PET CT  Hypermetabolic soft tissue thickening along the rectal wall compatible with known malignancy.  There are a few prominent perirectal mesorectal lymph nodes without significant hypermetabolic activity, please note some may be too small for characterization by PET.     0.9 cm lingular  pulmonary nodule with mild hypermetabolic activity and prominent hypermetabolic mediastinal lymph node, concerning for metastatic foci.     8/12/2024 -  Chemotherapy    Treatment Summary   Plan Name: OP GI mFOLFOX6 (oxaliplatin leucovorin fluorouracil) with bevacizumab Q2W  Treatment Goal: Palliative  Status: Active  Start Date: 8/12/2024  End Date: 7/4/2025 (Planned)  Provider: Dion Rudolph MD  Chemotherapy: oxaliplatin (ELOXATIN) 85 mg/m2 = 194 mg in D5W 603.8 mL chemo infusion, 85 mg/m2 = 194 mg, Intravenous, Clinic/HOD 1 time, 4 of 24 cycles  Administration: 194 mg (8/12/2024), 194 mg (8/28/2024), 194 mg (9/12/2024), 194 mg (9/25/2024)  fluorouracil (Adrucil) 2,400 mg/m2 = 5,470 mg in 0.9% NaCl 240 mL chemo infusion, 2,400 mg/m2 = 5,470 mg, Intravenous, Clinic/HOD 1 time, 4 of 24 cycles  Administration: 5,470 mg (8/12/2024), 5,470 mg (8/28/2024), 5,470 mg (9/12/2024), 5,470 mg (9/25/2024)  bevacizumab-awwb (MVASI) 500 mg in 0.9% NaCl 100 mL infusion, 550 mg, Intravenous, Clinic/HOD 1 time, 4 of 24 cycles  Administration: 500 mg (8/12/2024), 500 mg (8/28/2024), 500 mg (9/12/2024), 500 mg (9/25/2024)        Interval History:   Ms. Segura returns prior to cycle 5 of FOLFOX + bevacizumab. She feels well today.  Has transient side effects from chemotherapy including fatigue, loss of appetite and cold sensitivity.  With the most recent cycles she had some diarrhea.  She was experiencing some pain in late September with finding large L UPJ stone, had L ureteral stent placed.  Also was diagnosed with c diff.  Her last chemotherapy cycle was held.  She had subsequent lithotripsy with tentative plans to remove her stent next week.    Patient presents to virtual visit alone.  ECOG PS is 0.    ROS:   Review of Systems   Constitutional:  Positive for malaise/fatigue and weight loss. Negative for fever.   HENT:  Negative for hearing loss.    Eyes:  Negative for blurred vision.   Respiratory:  Negative for cough,  hemoptysis and shortness of breath.    Cardiovascular:  Negative for chest pain.   Gastrointestinal:  Positive for abdominal pain. Negative for blood in stool, constipation, diarrhea, nausea and vomiting.   Genitourinary:  Negative for dysuria, frequency and urgency.   Musculoskeletal:  Negative for back pain and myalgias.   Skin:  Negative for rash.   Neurological:  Negative for dizziness, tingling, weakness and headaches.   Endo/Heme/Allergies:  Does not bruise/bleed easily.   Psychiatric/Behavioral:  Negative for depression. The patient is not nervous/anxious.        Past Medical History:   Past Medical History:   Diagnosis Date    Anxiety disorder, unspecified     Fibroids     HTN (hypertension)     Sinus problem         Past Surgical History:   Past Surgical History:   Procedure Laterality Date    BILATERAL SALPINGO-OOPHORECTOMY (BSO) Bilateral 12/9/2020    Procedure: SALPINGO-OOPHORECTOMY, BILATERAL;  Surgeon: Yun Recio MD;  Location: [facility];  Service: OB/GYN;  Laterality: Bilateral;  COVID NEGATIVE 12/3    INCISION OF UVULA      INSERTION OF TUNNELED CENTRAL VENOUS CATHETER (CVC) WITH SUBCUTANEOUS PORT Right 7/29/2024    Procedure: INSERTION, SINGLE LUMEN CATHETER WITH PORT, WITH FLUOROSCOPIC GUIDANCE;  Surgeon: Russel Layton MD;  Location: [facility] OR [address] [place] [address];  Service: General;  Laterality: Right;    knee arhroscopy      NASAL SEPTUM SURGERY      SINUS SURGERY      TONSILLECTOMY      TOTAL ABDOMINAL HYSTERECTOMY N/A 12/9/2020    Procedure: HYSTERECTOMY, TOTAL, ABDOMINAL;  Surgeon: Yun Recio MD;  Location: [facility];  Service: OB/GYN;  Laterality: N/A;    UTERINE FIBROID SURGERY          Family History:   Family History   Problem Relation Name Age of Onset    Asthma Mother      Hypertension Mother      COPD Father      Diabetes Father      Hypertension Father      Cancer Maternal Uncle      Colon cancer Neg Hx          Social History:   Social History     Tobacco Use    Smoking  status: Never    Smokeless tobacco: Never   Substance Use Topics    Alcohol use: Yes     Comment: Socially        I have reviewed and updated the patient's past medical, surgical, family and social histories.    Allergies:   Review of patient's allergies indicates:  No Known Allergies     Medications:   Current Outpatient Medications   Medication Sig Dispense Refill    cetirizine (ZYRTEC) 10 MG tablet Take 10 mg by mouth daily as needed.       cholecalciferol, vitamin D3, 5,000 unit Tab Take 5,000 Units by mouth once daily.      citalopram (CELEXA) 20 MG tablet Take 20 mg by mouth every morning.      EVAMIST 1.53 mg/spray (1.7%) transdermal spray SPRAY 2 SPRAYS TO FOREARM DAILY      fluticasone (FLONASE) 50 mcg/actuation nasal spray 1 spray (50 mcg total) by Each Nare route once daily. (Patient not taking: Reported on 9/10/2024) 16 g 0    hydroCHLOROthiazide (HYDRODIURIL) 25 MG tablet Take 25 mg by mouth every morning.      LIDOcaine-prilocaine (EMLA) cream Apply topically as needed. 30 g 3    niacin 50 MG Tab niacin      niacin 50 MG Tab       omega 3-dha-epa-fish oil (FISH OIL) 1,000 mg (120 mg-180 mg) Cap Take by mouth once daily.      ondansetron (ZOFRAN-ODT) 8 MG TbDL Take 1 tablet (8 mg total) by mouth every 6 (six) hours as needed (chemotherapy induced nausea/vomiting). 60 tablet 4    rosuvastatin (CRESTOR) 10 MG tablet Take by mouth every evening.       No current facility-administered medications for this visit.      Physical Exam:   [facility] 11/18/2020          Physical Exam  Constitutional:       General: She is not in acute distress.     Appearance: Normal appearance. She is well-developed and normal weight. She is not ill-appearing, toxic-appearing or diaphoretic.      Comments: PE limited d/t nature of virtual visit   HENT:      Head: Normocephalic and atraumatic.      Right Ear: External ear normal.      Left Ear: External ear normal.   Eyes:      General: No scleral icterus.     Conjunctiva/sclera:  Conjunctivae normal.   Pulmonary:      Effort: Pulmonary effort is normal. No respiratory distress.   Skin:     Capillary Refill: Capillary refill takes less than 2 seconds.   Neurological:      Mental Status: She is alert and oriented to person, place, and time.      Gait: Gait is intact.   Psychiatric:         Mood and Affect: Mood normal.         Behavior: Behavior normal.         Labs:   No results found for this or any previous visit (from the past 48 hours).  Labs reviewed. Mild anemia.  Unremarkable CMP.    Imaging:      CT CAP - 10/21/24:    Impression:     In this patient with a known history of rectal malignancy, at the perirectal lymph nodes have significantly decreased in size, for example the 8 mm lymph node now measures 4 mm.  Additionally, the pulmonary nodules are also either resolved or significantly decreased in size.  For example the spiculated nodule in the lingula previously measured 1 cm and currently measures 0.5 cm.     Soft tissue density nodule in the anterior mediastinum measures 1.7 cm, stable.     Focal regions of sclerosis in the right roxann sacrum and T10 vertebra, stable and remaining indeterminate.     Cholelithiasis.     Large hiatal hernia.     Interval placement of a left ureteral stent      Path:   Distal rectal mass biopsy 6/21/24: well differentiated adenocarcinoma, negative for LVI and perineural invasion. IHC MSI-H low probability with intact nuclear expressions of MLH1, MSH2, MSH6, and PMS2.         Assessment:       1. Rectal adenocarcinoma    2. Malignant neoplasm metastatic to left lung    3. 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 Microcytic anemia    6. Generalized anxiety disorder    7. Hypertension, unspecified type    8. Hyperlipidemia, unspecified hyperlipidemia type    9. Irritable bowel syndrome with constipation    10. Allergic rhinitis, unspecified seasonality, unspecified trigger         Plan:         #  Rectal adenocarcinoma  Imelda Kimble is a 49 y.o. woman with distal rectal G1 adenocarcinoma pMMR, 5.6 cm long, below the anterior peritoneal reflection, MRF threatened, with involvement the internal sphincter, EMVI-, with multiple (8+) small suspicious mesorectal and superior rectal nodes.     She was in her usual health and went to PCP for annual screening in 2024. PCP recommended screening colonoscopy as she is >45. 2 weeks prior to her screening colonoscopy, she noticed hematochezia and rectal pain. Her only symptom prior to this was constipation, which is lifelong. Found to have distal rectal tumor as above.    PET/CT 7/19/24 showed hypermetabolism of the primary rectal tumor with small mesorectal lymph nodes.  Additional 9mm L lung nodule and mediastinal lymph node were hypermetabolic.    We discussed her at tumor board with recommendations for induction chemotherapy and then potential local therapy (chemoradiation) to rectum and SBRT/surgery to lung.    Had port placed on 7/29/24    Received cycle 1 FOLFOX + Avastin on 8/12/24.  Repeat imaging after cycle 4 shows good response in mesorectal lymph nodes and L lingular lung metastasis.  Will start 5-FU + Avastin maintenance today and restage her pelvis with rectal MRI to discuss moving towards consolidative chemoradiation to her rectum.  Will present at colorectal tumor board after MRI.    Labs reviewed and adequate for treatment.  Proceed with cycle 5 of 5-FU + Avastin today.    RTC in 2 weeks. Pt would like to continue treatment Fidelity.     # Microcytic anemia   Suspect MICHELLE.  Mild.  Monitor.    Other chronic disease managed by PCP   # Generalized anxiety disorder - continue citalopram   # HTN - HCTZ   # HLD - statin  # allergic rhinitis - zyrtec and flonase, stable   # IBS-Constipation - Stable    Follow up: 2 weeks for cycle 6    Patient is in agreement with the proposed treatment plan. All questions were answered to the patient's satisfaction. Pt knows  to call clinic if anything is needed before the next clinic visit.    Dion Rudolph MD  Hematology/Oncology  [facility]      Route Chart for Scheduling    Med Onc Chart Routing      Follow up with physician 4 weeks. virtual   Follow up with LEIDA 2 weeks. virtual   Infusion scheduling note   5-FU + Avastin at [facility]   Injection scheduling note    Labs CBC, CMP, CEA and urinalysis   Scheduling:  Preferred lab:  Lab interval: every 2 weeks     Imaging MRI   MRI rectum next available on Tues thru Thurs at [facility]   Pharmacy appointment    Other referrals              Treatment Plan Information   OP GI mFOLFOX6 (oxaliplatin leucovorin fluorouracil) with bevacizumab Q2W Dion Rudolph MD   Associated diagnosis: Rectal adenocarcinoma Stage NAVID cT2, cN2b, cM1a noted on 7/15/2024   Line of treatment: First Line  Treatment Goal: Palliative     Upcoming Treatment Dates - OP GI mFOLFOX6 (oxaliplatin leucovorin fluorouracil) with bevacizumab Q2W    10/9/2024       Chemotherapy       bevacizumab-awwb (MVASI) 5 mg/kg = 550 mg in 0.9% NaCl 100 mL infusion       oxaliplatin (ELOXATIN) 85 mg/m2 = 194 mg in D5W 538.8 mL chemo infusion       fluorouracil (Adrucil) 2,400 mg/m2 = 5,470 mg in 0.9% NaCl 240 mL chemo infusion       Antiemetics       palonosetron 0.25mg/dexAMETHasone 12mg in NS IVPB 0.25 mg 50 mL  10/23/2024       Chemotherapy       bevacizumab-awwb (MVASI) 5 mg/kg = 550 mg in 0.9% NaCl 100 mL infusion       oxaliplatin (ELOXATIN) 85 mg/m2 = 194 mg in D5W 538.8 mL chemo infusion       fluorouracil (Adrucil) 2,400 mg/m2 = 5,470 mg in 0.9% NaCl 240 mL chemo infusion       Antiemetics       palonosetron 0.25mg/dexAMETHasone 12mg in NS IVPB 0.25 mg 50 mL  11/6/2024       Chemotherapy       bevacizumab-awwb (MVASI) 5 mg/kg = 550 mg in 0.9% NaCl 100 mL infusion       oxaliplatin (ELOXATIN) 85 mg/m2 = 194 mg in D5W 538.8 mL chemo infusion       fluorouracil (Adrucil) 2,400 mg/m2 = 5,470 mg in  0.9% NaCl 240 mL chemo infusion       Antiemetics       palonosetron 0.25mg/dexAMETHasone 12mg in NS IVPB 0.25 mg 50 mL  11/20/2024       Chemotherapy       bevacizumab-awwb (MVASI) 5 mg/kg = 550 mg in 0.9% NaCl 100 mL infusion       oxaliplatin (ELOXATIN) 85 mg/m2 = 194 mg in D5W 538.8 mL chemo infusion       fluorouracil (Adrucil) 2,400 mg/m2 = 5,470 mg in 0.9% NaCl 240 mL chemo infusion       Antiemetics       palonosetron 0.25mg/dexAMETHasone 12mg in NS IVPB 0.25 mg 50 mL

## 2024-10-31 ENCOUNTER — HOSPITAL ENCOUNTER (OUTPATIENT)
Dept: RADIOLOGY | Facility: HOSPITAL | Age: 49
Discharge: HOME | End: 2024-10-31
Attending: INTERNAL MEDICINE
Payer: COMMERCIAL

## 2024-10-31 DIAGNOSIS — C20 RECTAL ADENOCARCINOMA: ICD-10-CM

## 2024-10-31 PROCEDURE — 72195 MRI PELVIS W/O DYE: CPT | Mod: TC

## 2024-10-31 PROCEDURE — 72195 MRI PELVIS W/O DYE: CPT | Mod: 26,,, | Performed by: STUDENT IN AN ORGANIZED HEALTH CARE EDUCATION/TRAINING PROGRAM

## 2024-11-05 ENCOUNTER — OFFICE VISIT (OUTPATIENT)
Dept: HEMATOLOGY/ONCOLOGY | Facility: CLINIC | Age: 49
End: 2024-11-05
Payer: COMMERCIAL

## 2024-11-05 ENCOUNTER — LAB VISIT (OUTPATIENT)
Dept: LAB | Facility: HOSPITAL | Age: 49
End: 2024-11-05
Attending: PHYSICIAN ASSISTANT
Payer: COMMERCIAL

## 2024-11-05 DIAGNOSIS — C78.02 MALIGNANT NEOPLASM METASTATIC TO LEFT LUNG: ICD-10-CM

## 2024-11-05 DIAGNOSIS — K58.1 IRRITABLE BOWEL SYNDROME WITH CONSTIPATION: ICD-10-CM

## 2024-11-05 DIAGNOSIS — D84.821 IMMUNODEFICIENCY SECONDARY TO CHEMOTHERAPY: ICD-10-CM

## 2024-11-05 DIAGNOSIS — N20.0 NEPHROLITHIASIS: ICD-10-CM

## 2024-11-05 DIAGNOSIS — D84.81 IMMUNODEFICIENCY SECONDARY TO NEOPLASM: ICD-10-CM

## 2024-11-05 DIAGNOSIS — C20 RECTAL ADENOCARCINOMA: ICD-10-CM

## 2024-11-05 DIAGNOSIS — Z79.899 IMMUNODEFICIENCY SECONDARY TO CHEMOTHERAPY: ICD-10-CM

## 2024-11-05 DIAGNOSIS — C20 RECTAL ADENOCARCINOMA: Primary | ICD-10-CM

## 2024-11-05 DIAGNOSIS — D50.9 MICROCYTIC ANEMIA: ICD-10-CM

## 2024-11-05 DIAGNOSIS — J30.9 ALLERGIC RHINITIS, UNSPECIFIED SEASONALITY, UNSPECIFIED TRIGGER: ICD-10-CM

## 2024-11-05 DIAGNOSIS — F41.1 GENERALIZED ANXIETY DISORDER: ICD-10-CM

## 2024-11-05 DIAGNOSIS — T45.1X5A IMMUNODEFICIENCY SECONDARY TO CHEMOTHERAPY: ICD-10-CM

## 2024-11-05 DIAGNOSIS — E78.5 HYPERLIPIDEMIA, UNSPECIFIED HYPERLIPIDEMIA TYPE: ICD-10-CM

## 2024-11-05 DIAGNOSIS — I10 HYPERTENSION, UNSPECIFIED TYPE: ICD-10-CM

## 2024-11-05 DIAGNOSIS — D49.9 IMMUNODEFICIENCY SECONDARY TO NEOPLASM: ICD-10-CM

## 2024-11-05 LAB
ALBUMIN SERPL BCP-MCNC: 3.7 G/DL (ref 3.5–5.2)
ALP SERPL-CCNC: 136 U/L (ref 40–150)
ALT SERPL W/O P-5'-P-CCNC: 30 U/L (ref 10–44)
ANION GAP SERPL CALC-SCNC: 11 MMOL/L (ref 8–16)
AST SERPL-CCNC: 27 U/L (ref 10–40)
BACTERIA #/AREA URNS HPF: ABNORMAL /HPF
BASOPHILS # BLD AUTO: 0.06 K/UL (ref 0–0.2)
BASOPHILS NFR BLD: 0.9 % (ref 0–1.9)
BILIRUB SERPL-MCNC: 0.4 MG/DL (ref 0.1–1)
BILIRUB UR QL STRIP: NEGATIVE
BUN SERPL-MCNC: 14 MG/DL (ref 6–20)
CALCIUM SERPL-MCNC: 10.2 MG/DL (ref 8.7–10.5)
CEA SERPL-MCNC: 2.5 NG/ML (ref 0–5)
CHLORIDE SERPL-SCNC: 103 MMOL/L (ref 95–110)
CLARITY UR: CLEAR
CO2 SERPL-SCNC: 27 MMOL/L (ref 23–29)
COLOR UR: YELLOW
CREAT SERPL-MCNC: 0.8 MG/DL (ref 0.5–1.4)
DIFFERENTIAL METHOD BLD: ABNORMAL
EOSINOPHIL # BLD AUTO: 0.1 K/UL (ref 0–0.5)
EOSINOPHIL NFR BLD: 2 % (ref 0–8)
ERYTHROCYTE [DISTWIDTH] IN BLOOD BY AUTOMATED COUNT: 16.9 % (ref 11.5–14.5)
EST. GFR  (NO RACE VARIABLE): >60 ML/MIN/1.73 M^2
GLUCOSE SERPL-MCNC: 95 MG/DL (ref 70–110)
GLUCOSE UR QL STRIP: NEGATIVE
HCT VFR BLD AUTO: 38.2 % (ref 37–48.5)
HGB BLD-MCNC: 12.5 G/DL (ref 12–16)
HGB UR QL STRIP: NEGATIVE
IMM GRANULOCYTES # BLD AUTO: 0.01 K/UL (ref 0–0.04)
IMM GRANULOCYTES NFR BLD AUTO: 0.1 % (ref 0–0.5)
KETONES UR QL STRIP: NEGATIVE
LEUKOCYTE ESTERASE UR QL STRIP: ABNORMAL
LYMPHOCYTES # BLD AUTO: 1.7 K/UL (ref 1–4.8)
LYMPHOCYTES NFR BLD: 24.4 % (ref 18–48)
MCH RBC QN AUTO: 27 PG (ref 27–31)
MCHC RBC AUTO-ENTMCNC: 32.7 G/DL (ref 32–36)
MCV RBC AUTO: 83 FL (ref 82–98)
MICROSCOPIC COMMENT: ABNORMAL
MONOCYTES # BLD AUTO: 0.6 K/UL (ref 0.3–1)
MONOCYTES NFR BLD: 9.1 % (ref 4–15)
NEUTROPHILS # BLD AUTO: 4.4 K/UL (ref 1.8–7.7)
NEUTROPHILS NFR BLD: 63.5 % (ref 38–73)
NITRITE UR QL STRIP: NEGATIVE
NRBC BLD-RTO: 0 /100 WBC
PH UR STRIP: 6 [PH] (ref 5–8)
PLATELET # BLD AUTO: 270 K/UL (ref 150–450)
PMV BLD AUTO: 11.2 FL (ref 9.2–12.9)
POTASSIUM SERPL-SCNC: 4 MMOL/L (ref 3.5–5.1)
PROT SERPL-MCNC: 7.1 G/DL (ref 6–8.4)
PROT UR QL STRIP: NEGATIVE
RBC # BLD AUTO: 4.63 M/UL (ref 4–5.4)
RBC #/AREA URNS HPF: 1 /HPF (ref 0–4)
SODIUM SERPL-SCNC: 141 MMOL/L (ref 136–145)
SP GR UR STRIP: 1.02 (ref 1–1.03)
SQUAMOUS #/AREA URNS HPF: 4 /HPF
URN SPEC COLLECT METH UR: ABNORMAL
UROBILINOGEN UR STRIP-ACNC: NEGATIVE EU/DL
WBC # BLD AUTO: 7 K/UL (ref 3.9–12.7)
WBC #/AREA URNS HPF: 13 /HPF (ref 0–5)

## 2024-11-05 PROCEDURE — 82378 CARCINOEMBRYONIC ANTIGEN: CPT | Performed by: PHYSICIAN ASSISTANT

## 2024-11-05 PROCEDURE — 80053 COMPREHEN METABOLIC PANEL: CPT | Performed by: PHYSICIAN ASSISTANT

## 2024-11-05 PROCEDURE — 85025 COMPLETE CBC W/AUTO DIFF WBC: CPT | Performed by: PHYSICIAN ASSISTANT

## 2024-11-05 PROCEDURE — 1160F RVW MEDS BY RX/DR IN RCRD: CPT | Mod: CPTII,95,, | Performed by: PHYSICIAN ASSISTANT

## 2024-11-05 PROCEDURE — G2211 COMPLEX E/M VISIT ADD ON: HCPCS | Mod: 95,,, | Performed by: PHYSICIAN ASSISTANT

## 2024-11-05 PROCEDURE — 36415 COLL VENOUS BLD VENIPUNCTURE: CPT | Performed by: PHYSICIAN ASSISTANT

## 2024-11-05 PROCEDURE — 81000 URINALYSIS NONAUTO W/SCOPE: CPT | Performed by: INTERNAL MEDICINE

## 2024-11-05 PROCEDURE — 1159F MED LIST DOCD IN RCRD: CPT | Mod: CPTII,95,, | Performed by: PHYSICIAN ASSISTANT

## 2024-11-05 PROCEDURE — 99215 OFFICE O/P EST HI 40 MIN: CPT | Mod: 95,,, | Performed by: PHYSICIAN ASSISTANT

## 2024-11-05 RX ORDER — HEPARIN 100 UNIT/ML
500 SYRINGE INTRAVENOUS
Status: CANCELLED | OUTPATIENT
Start: 2024-11-05

## 2024-11-05 RX ORDER — DIPHENHYDRAMINE HYDROCHLORIDE 50 MG/ML
50 INJECTION INTRAMUSCULAR; INTRAVENOUS ONCE AS NEEDED
Status: CANCELLED | OUTPATIENT
Start: 2024-11-05

## 2024-11-05 RX ORDER — HEPARIN 100 UNIT/ML
500 SYRINGE INTRAVENOUS
Status: CANCELLED | OUTPATIENT
Start: 2024-11-07

## 2024-11-05 RX ORDER — SODIUM CHLORIDE 0.9 % (FLUSH) 0.9 %
10 SYRINGE (ML) INJECTION
Status: CANCELLED | OUTPATIENT
Start: 2024-11-07

## 2024-11-05 RX ORDER — EPINEPHRINE 0.3 MG/.3ML
0.3 INJECTION SUBCUTANEOUS ONCE AS NEEDED
Status: CANCELLED | OUTPATIENT
Start: 2024-11-05

## 2024-11-05 RX ORDER — PROCHLORPERAZINE EDISYLATE 5 MG/ML
10 INJECTION INTRAMUSCULAR; INTRAVENOUS ONCE AS NEEDED
Status: CANCELLED | OUTPATIENT
Start: 2024-11-07

## 2024-11-05 RX ORDER — SODIUM CHLORIDE 0.9 % (FLUSH) 0.9 %
10 SYRINGE (ML) INJECTION
Status: CANCELLED | OUTPATIENT
Start: 2024-11-05

## 2024-11-05 RX ORDER — PROCHLORPERAZINE EDISYLATE 5 MG/ML
10 INJECTION INTRAMUSCULAR; INTRAVENOUS ONCE AS NEEDED
Status: CANCELLED | OUTPATIENT
Start: 2024-11-05

## 2024-11-05 NOTE — PROGRESS NOTES
The patient location is: [facility]  The chief complaint leading to consultation is: treatment follow up, lab review, chemo clearance     Visit type: audiovisual    Each patient to whom he or she provides medical services by telemedicine is:  (1) informed of the relationship between the physician and patient and the respective role of any other health care provider with respect to management of the patient; and (2) notified that he or she may decline to receive medical services by telemedicine and may withdraw from such care at any time.    Notes:     MEDICAL ONCOLOGY - ESTABLISHED PATIENT VISIT    Reason for visit: rectal cancer     Best Contact Phone Number(s): [phone] (home)      Cancer/Stage/TNM:    Cancer Staging   Rectal adenocarcinoma  Staging form: Colon and Rectum, AJCC 8th Edition  - Clinical stage from 6/21/2024: Stage NAVID (cT2, cN2b, cM1a) - Signed by Dion Rudolph MD on 7/24/2024  - Pathologic: No stage assigned - Unsigned       Oncology History   Rectal adenocarcinoma   6/21/2024 Procedure    Colonoscopy - mild diverticular disease, mass in distal rectum (Complete)     6/21/2024 Cancer Staged    Staging form: Colon and Rectum, AJCC 8th Edition  - Clinical stage from 6/21/2024: Stage NAVID (cT2, cN2b, cM1a)     6/24/2024 Initial Diagnosis    Rectal adenocarcinoma  ERROL  - Colonoscopy 6/21/24: infiltrative, ulcerated, sub-mucosal, fungating and villous bleeding mass found in distal rectum, 6-7cm from the anus proximally.   - Distal rectal mass biopsy 6/21/24: well differentiated adenocarcinoma, negative for LVI and perineural invasion. IHC MSI-H low probability with intact nuclear expressions of MLH1, MSH2, MSH6, and PMS2.        7/15/2024 Imaging Significant Findings    MRI Rectal cancer  Low rectal tumor invading the internal sphincter in close approximation to the mesorectal fascia without definite involvement.  Multiple suspicious mesorectal and superior rectal nodes.     *MR STAGE      T: T1/T2 (tumor confined to rectal wall)     N: N+ (short axis ? 9 mm)     Blayne statement: Multiple suspicious mesorectal and superior rectal nodes.     *MRF: threatened (tumor margin within 1-2 mm of MRF) noting this is a low rectal tumor.  No definite evidence of invasion.     Sphincter Involvement: Yes involvement the internal sphincter     Suspicious Extramesorectal Lymph Nodes: No     EMVI: No     7/16/2024 Tumor Markers    Patient's tumor was tested for the following markers: CEA                                              Results of the tumor marker test revealed 2.9     7/17/2024 Imaging Significant Findings    CT CAP  In this patient with a known history of rectal malignancy, there are few borderline prominent lymph nodes adjacent to the rectum measuring up to 8 mm.  There are solid nodules in both lungs, the largest in the lingula measuring up to 1 cm.  Given the patient's primary malignancy, pulmonary metastatic disease cannot be excluded.  Follow-up based upon oncologic guidance.     Prominent soft tissue density in the anterior superior mediastinum which could possibly represent residual thymic tissue with a focal solid structure measuring up to 1.0 x 1.7 cm, possibly an enlarged lymph node versus other soft tissue mass.     Indeterminate focal regions of sclerosis within the T10 vertebra and right sacrum.  Osseous metastatic disease cannot be excluded.     Cholelithiasis without CT evidence for cholecystitis.     Large hiatal hernia.  Diverticulosis coli without diverticulitis.     Large left UPJ stone measuring up to 1.2 cm without hydronephrosis or hydroureter.     7/19/2024 Imaging Significant Findings    PET CT  Hypermetabolic soft tissue thickening along the rectal wall compatible with known malignancy.  There are a few prominent perirectal mesorectal lymph nodes without significant hypermetabolic activity, please note some may be too small for characterization by PET.     0.9 cm lingular  pulmonary nodule with mild hypermetabolic activity and prominent hypermetabolic mediastinal lymph node, concerning for metastatic foci.     8/12/2024 -  Chemotherapy    Treatment Summary   Plan Name: OP GI mFOLFOX6 (oxaliplatin leucovorin fluorouracil) with bevacizumab Q2W  Treatment Goal: Palliative  Status: Active  Start Date: 8/12/2024  End Date: 7/17/2025 (Planned)  Provider: Dion Rudolph MD  Chemotherapy: oxaliplatin (ELOXATIN) 85 mg/m2 = 194 mg in D5W 603.8 mL chemo infusion, 85 mg/m2 = 194 mg, Intravenous, Clinic/HOD 1 time, 4 of 4 cycles  Administration: 194 mg (8/12/2024), 194 mg (8/28/2024), 194 mg (9/12/2024), 194 mg (9/25/2024)  fluorouracil (Adrucil) 2,400 mg/m2 = 5,470 mg in 0.9% NaCl 240 mL chemo infusion, 2,400 mg/m2 = 5,470 mg, Intravenous, Clinic/HOD 1 time, 5 of 24 cycles  Administration: 5,470 mg (8/12/2024), 5,470 mg (8/28/2024), 5,470 mg (9/12/2024), 5,470 mg (9/25/2024), 5,470 mg (10/23/2024)  bevacizumab-awwb (MVASI) 500 mg in 0.9% NaCl 100 mL infusion, 550 mg, Intravenous, Clinic/HOD 1 time, 5 of 24 cycles  Administration: 500 mg (8/12/2024), 500 mg (8/28/2024), 500 mg (9/12/2024), 500 mg (9/25/2024), 500 mg (10/23/2024)     She was experiencing some pain in late September with finding large L UPJ stone 1.2 cm, had L ureteral stent placed.  Also was diagnosed with c diff.  Her last chemotherapy cycle was held.  She had subsequent lithotripsy with stent removal on 10/28/2024.      Interval History:   Ms. Segura returns prior to cycle 6 of FOLFOX + bevacizumab, now on maintenance 5-FU plus avastin. Last dose of Oxaliplatin was given on 9/25/2024. She feels well today and continues to tolerate treatment well.  Has transient side effects from chemotherapy including fatigue and mild nausea. No vomiting and she is eating well.  With the most recent cycles she had some diarrhea but improved. Feeling much better today    Patient presents to virtual visit alone.  ECOG PS is 0.    ROS:   Review  of Systems   Constitutional:  Positive for malaise/fatigue and weight loss. Negative for fever.   HENT:  Negative for hearing loss.    Eyes:  Negative for blurred vision.   Respiratory:  Negative for cough, hemoptysis and shortness of breath.    Cardiovascular:  Negative for chest pain.   Gastrointestinal:  Positive for abdominal pain. Negative for blood in stool, constipation, diarrhea, nausea and vomiting.   Genitourinary:  Negative for dysuria, frequency and urgency.   Musculoskeletal:  Negative for back pain and myalgias.   Skin:  Negative for rash.   Neurological:  Negative for dizziness, tingling, weakness and headaches.   Endo/Heme/Allergies:  Does not bruise/bleed easily.   Psychiatric/Behavioral:  Negative for depression. The patient is not nervous/anxious.        Past Medical History:   Past Medical History:   Diagnosis Date    Anxiety disorder, unspecified     Fibroids     HTN (hypertension)     Sinus problem         Past Surgical History:   Past Surgical History:   Procedure Laterality Date    BILATERAL SALPINGO-OOPHORECTOMY (BSO) Bilateral 12/9/2020    Procedure: SALPINGO-OOPHORECTOMY, BILATERAL;  Surgeon: Yun Recio MD;  Location: [facility] OR;  Service: OB/GYN;  Laterality: Bilateral;  COVID NEGATIVE 12/3    INCISION OF UVULA      INSERTION OF TUNNELED CENTRAL VENOUS CATHETER (CVC) WITH SUBCUTANEOUS PORT Right 7/29/2024    Procedure: INSERTION, SINGLE LUMEN CATHETER WITH PORT, WITH FLUOROSCOPIC GUIDANCE;  Surgeon: Russel Layton MD;  Location: [address];  Service: General;  Laterality: Right;    knee arhroscopy      NASAL SEPTUM SURGERY      SINUS SURGERY      TONSILLECTOMY      TOTAL ABDOMINAL HYSTERECTOMY N/A 12/9/2020    Procedure: HYSTERECTOMY, TOTAL, ABDOMINAL;  Surgeon: Yun Recio MD;  Location: [facility] OR;  Service: OB/GYN;  Laterality: N/A;    UTERINE FIBROID SURGERY          Family History:   Family History   Problem Relation Name Age of Onset    Asthma Mother       Hypertension Mother      COPD Father      Diabetes Father      Hypertension Father      Cancer Maternal Uncle      Colon cancer Neg Hx          Social History:   Social History     Tobacco Use    Smoking status: Never    Smokeless tobacco: Never   Substance Use Topics    Alcohol use: Yes     Comment: Socially        I have reviewed and updated the patient's past medical, surgical, family and social histories.    Allergies:   Review of patient's allergies indicates:  No Known Allergies     Medications:   Current Outpatient Medications   Medication Sig Dispense Refill    cetirizine (ZYRTEC) 10 MG tablet Take 10 mg by mouth daily as needed.       cholecalciferol, vitamin D3, 5,000 unit Tab Take 5,000 Units by mouth once daily.      citalopram (CELEXA) 20 MG tablet Take 20 mg by mouth every morning.      EVAMIST 1.53 mg/spray (1.7%) transdermal spray SPRAY 2 SPRAYS TO FOREARM DAILY      fluticasone (FLONASE) 50 mcg/actuation nasal spray 1 spray (50 mcg total) by Each Nare route once daily. (Patient not taking: Reported on 9/10/2024) 16 g 0    hydroCHLOROthiazide (HYDRODIURIL) 25 MG tablet Take 25 mg by mouth every morning.      LIDOcaine-prilocaine (EMLA) cream Apply topically as needed. 30 g 3    LORazepam (ATIVAN) 0.5 MG tablet Take 1 tablet (0.5 mg total) by mouth every 12 (twelve) hours as needed for Anxiety. 5 tablet 0    niacin 50 MG Tab niacin      niacin 50 MG Tab       omega 3-dha-epa-fish oil (FISH OIL) 1,000 mg (120 mg-180 mg) Cap Take by mouth once daily.      ondansetron (ZOFRAN-ODT) 8 MG TbDL Take 1 tablet (8 mg total) by mouth every 6 (six) hours as needed (chemotherapy induced nausea/vomiting). 60 tablet 4    rosuvastatin (CRESTOR) 10 MG tablet Take by mouth every evening.       No current facility-administered medications for this visit.      Physical Exam:   [facility] 11/18/2020          Physical Exam  Constitutional:       General: She is not in acute distress.     Appearance: Normal appearance. She is  well-developed and normal weight. She is not ill-appearing, toxic-appearing or diaphoretic.      Comments: PE limited d/t nature of virtual visit   HENT:      Head: Normocephalic and atraumatic.      Right Ear: External ear normal.      Left Ear: External ear normal.   Eyes:      General: No scleral icterus.     Conjunctiva/sclera: Conjunctivae normal.   Pulmonary:      Effort: Pulmonary effort is normal. No respiratory distress.   Skin:     Capillary Refill: Capillary refill takes less than 2 seconds.   Neurological:      Mental Status: She is alert and oriented to person, place, and time.      Gait: Gait is intact.   Psychiatric:         Mood and Affect: Mood normal.         Behavior: Behavior normal.         Labs:   Recent Results (from the past 48 hours)   CBC W/ AUTO DIFFERENTIAL    Collection Time: 11/05/24 10:07 AM   Result Value Ref Range    WBC 7.00 3.90 - 12.70 K/uL    RBC 4.63 4.00 - 5.40 M/uL    Hemoglobin 12.5 12.0 - 16.0 g/dL    Hematocrit 38.2 37.0 - 48.5 %    MCV 83 82 - 98 fL    MCH 27.0 27.0 - 31.0 pg    MCHC 32.7 32.0 - 36.0 g/dL    RDW 16.9 (H) 11.5 - 14.5 %    Platelets 270 150 - 450 K/uL    MPV 11.2 9.2 - 12.9 fL    Immature Granulocytes 0.1 0.0 - 0.5 %    Gran # (ANC) 4.4 1.8 - 7.7 K/uL    Immature Grans (Abs) 0.01 0.00 - 0.04 K/uL    Lymph # 1.7 1.0 - 4.8 K/uL    Mono # 0.6 0.3 - 1.0 K/uL    Eos # 0.1 0.0 - 0.5 K/uL    Baso # 0.06 0.00 - 0.20 K/uL    nRBC 0 0 /100 WBC    Gran % 63.5 38.0 - 73.0 %    Lymph % 24.4 18.0 - 48.0 %    Mono % 9.1 4.0 - 15.0 %    Eosinophil % 2.0 0.0 - 8.0 %    Basophil % 0.9 0.0 - 1.9 %    Differential Method Automated    Comprehensive Metabolic Panel    Collection Time: 11/05/24 10:07 AM   Result Value Ref Range    Sodium 141 136 - 145 mmol/L    Potassium 4.0 3.5 - 5.1 mmol/L    Chloride 103 95 - 110 mmol/L    CO2 27 23 - 29 mmol/L    Glucose 95 70 - 110 mg/dL    BUN 14 6 - 20 mg/dL    Creatinine 0.8 0.5 - 1.4 mg/dL    Calcium 10.2 8.7 - 10.5 mg/dL    Total Protein  7.1 6.0 - 8.4 g/dL    Albumin 3.7 3.5 - 5.2 g/dL    Total Bilirubin 0.4 0.1 - 1.0 mg/dL    Alkaline Phosphatase 136 40 - 150 U/L    AST 27 10 - 40 U/L    ALT 30 10 - 44 U/L    eGFR >60 >60 mL/min/1.73 m^2    Anion Gap 11 8 - 16 mmol/L   Urinalysis    Collection Time: 11/05/24 10:10 AM   Result Value Ref Range    Specimen UA Urine, Clean Catch     Color, UA Yellow Yellow, Straw, Norma    Appearance, UA Clear Clear    pH, UA 6.0 5.0 - 8.0    Specific Gravity, UA 1.020 1.005 - 1.030    Protein, UA Negative Negative    Glucose, UA Negative Negative    Ketones, UA Negative Negative    Bilirubin (UA) Negative Negative    Occult Blood UA Negative Negative    Nitrite, UA Negative Negative    Urobilinogen, UA Negative <2.0 EU/dL    Leukocytes, UA Trace (A) Negative   Urinalysis Microscopic    Collection Time: 11/05/24 10:10 AM   Result Value Ref Range    RBC, UA 1 0 - 4 /hpf    WBC, UA 13 (H) 0 - 5 /hpf    Bacteria Moderate (A) None-Occ /hpf    Squam Epithel, UA 4 /hpf    Microscopic Comment SEE COMMENT      Labs reviewed.     Imaging:      MRI 10/31/2024:     Impression:     Motion degraded sequences.     *TUMOR     Compared to MRI 07/15/2024, post treatment primary tumor assessment: Probable Incomplete response (likely residual tumor)     Improving heterogeneous signal and wall thickening of the rectum with some residual restricted diffusion which suggests residual tumor.  Improvement of the size of multiple mesorectal lymph nodes.     *NODES     Suspicious Mesorectal Lymph nodes: No     Suspicious Extramesorectal Lymph nodes: No    CT CAP - 10/21/24:    Impression:     In this patient with a known history of rectal malignancy, at the perirectal lymph nodes have significantly decreased in size, for example the 8 mm lymph node now measures 4 mm.  Additionally, the pulmonary nodules are also either resolved or significantly decreased in size.  For example the spiculated nodule in the lingula previously measured 1 cm and  currently measures 0.5 cm.     Soft tissue density nodule in the anterior mediastinum measures 1.7 cm, stable.     Focal regions of sclerosis in the right roxann sacrum and T10 vertebra, stable and remaining indeterminate.     Cholelithiasis.     Large hiatal hernia.     Interval placement of a left ureteral stent      Path:   Distal rectal mass biopsy 6/21/24: well differentiated adenocarcinoma, negative for LVI and perineural invasion. IHC MSI-H low probability with intact nuclear expressions of MLH1, MSH2, MSH6, and PMS2.         Assessment:       1. Rectal adenocarcinoma    2. Malignant neoplasm metastatic to left lung    3. 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 Microcytic anemia    6. Generalized anxiety disorder    7. Hypertension, unspecified type    8. Hyperlipidemia, unspecified hyperlipidemia type    9. Irritable bowel syndrome with constipation    10. Allergic rhinitis, unspecified seasonality, unspecified trigger    11. Nephrolithiasis           Plan:         # Rectal adenocarcinoma  Imelda Kimble is a 49 y.o. woman with distal rectal G1 adenocarcinoma pMMR, 5.6 cm long, below the anterior peritoneal reflection, MRF threatened, with involvement the internal sphincter, EMVI-, with multiple (8+) small suspicious mesorectal and superior rectal nodes.     She was in her usual health and went to PCP for annual screening in 2024. PCP recommended screening colonoscopy as she is >45. 2 weeks prior to her screening colonoscopy, she noticed hematochezia and rectal pain. Her only symptom prior to this was constipation, which is lifelong. Found to have distal rectal tumor as above.    PET/CT 7/19/24 showed hypermetabolism of the primary rectal tumor with small mesorectal lymph nodes.  Additional 9mm L lung nodule and mediastinal lymph node were hypermetabolic.    We discussed her at tumor board with recommendations for induction chemotherapy and then potential  local therapy (chemoradiation) to rectum and SBRT/surgery to lung.    Had port placed on 7/29/24    Received cycle 1 FOLFOX + Avastin on 8/12/24. Ms. Segura returns prior to cycle 6 of FOLFOX + bevacizumab, now on maintenance 5-FU plus avastin. Last dose of Oxaliplatin was given on 9/25/2024  Repeat imaging after cycle 4 shows good response in mesorectal lymph nodes and L lingular lung metastasis.  Had restaging MRI of pelvis rectum that displays very good response to treatment. Reviewed by Dr Rudolph and Dr. Menendez and will discuss moving towards consolidative chemoradiation to her rectum.  Will present at colorectal tumor board after MRI.    Doing well. Eating well and has a good appetite  I have reviewed the CBC and CMP, adequate for treatment   UA stable.   Proceed with cycle 6 of 5-FU + Avastin today.    RTC in 2 weeks. Pt would like to continue treatment [place].     # Microcytic anemia, nephrolithiasis  Suspect MICHELLE.  Mild.  Monitor.    Doing better. Ureteral stent was removed on 10/28/2024. No urinary symptoms at this time.     Other chronic disease managed by PCP   # Generalized anxiety disorder - continue citalopram   # HTN - HCTZ   # HLD - statin  # allergic rhinitis - zyrtec and flonase, stable   # IBS-Constipation - Stable    Follow up: 2 weeks for cycle 7    Pte and family members displayed understanding of the above encounter and treatment plan. All thoughtful questions were answered to their satisfaction. Pte was advised to notify the care team or proceed to the ER if signs and symptoms worsen.     Visit today included increased complexity associated with the care of the episodic problem chemotherapy  addressed and managing the longitudinal care of the patient due to the serious and/or complex managed problem(s) GI malignancies/cancer      VIKASH Guzman, PAAzamC  [facility]  Dept of Hematology/Oncology  PAARELY to GI Oncology team         Route Chart for Scheduling    Med Onc Chart  Routing      Follow up with physician 4 weeks and 6 weeks. 5-FU/avastin   Follow up with LEIDA 2 weeks. 5-FU/avastin   Infusion scheduling note    Injection scheduling note    Labs CBC, CMP, urinalysis and CEA   Scheduling:  Preferred lab:  Lab interval: every 2 weeks     Imaging    Pharmacy appointment    Other referrals              Treatment Plan Information   OP GI mFOLFOX6 (oxaliplatin leucovorin fluorouracil) with bevacizumab Q2W Dion Rudolph MD   Associated diagnosis: Rectal adenocarcinoma Stage NAVID cT2, cN2b, cM1a noted on 7/15/2024   Line of treatment: First Line  Treatment Goal: Palliative     Upcoming Treatment Dates - OP GI mFOLFOX6 (oxaliplatin leucovorin fluorouracil) with bevacizumab Q2W    11/5/2024       Chemotherapy       bevacizumab-awwb (MVASI) 5 mg/kg = 550 mg in 0.9% NaCl 100 mL infusion       fluorouracil (Adrucil) 2,400 mg/m2 = 5,470 mg in 0.9% NaCl 240 mL chemo infusion       Antiemetics       palonosetron (ALOXI) 0.25 mg with Dexamethasone (DECADRON) 12 mg in NS 50 mL IVPB  11/19/2024       Chemotherapy       bevacizumab-awwb (MVASI) 5 mg/kg = 550 mg in 0.9% NaCl 100 mL infusion       fluorouracil (Adrucil) 2,400 mg/m2 = 5,470 mg in 0.9% NaCl 240 mL chemo infusion       Antiemetics       palonosetron (ALOXI) 0.25 mg with Dexamethasone (DECADRON) 12 mg in NS 50 mL IVPB  12/3/2024       Chemotherapy       bevacizumab-awwb (MVASI) 5 mg/kg = 550 mg in 0.9% NaCl 100 mL infusion       fluorouracil (Adrucil) 2,400 mg/m2 = 5,470 mg in 0.9% NaCl 240 mL chemo infusion       Antiemetics       palonosetron (ALOXI) 0.25 mg with Dexamethasone (DECADRON) 12 mg in NS 50 mL IVPB  12/17/2024       Chemotherapy       bevacizumab-awwb (MVASI) 5 mg/kg = 550 mg in 0.9% NaCl 100 mL infusion       fluorouracil (Adrucil) 2,400 mg/m2 = 5,470 mg in 0.9% NaCl 240 mL chemo infusion       Antiemetics       palonosetron (ALOXI) 0.25 mg with Dexamethasone (DECADRON) 12 mg in NS 50 mL IVPB

## 2024-11-06 NOTE — PROGRESS NOTES
[facility]  Colon, Rectal and Anal Malignancies  Multi-disciplinary Tumor Board     [facility] [place]  Tel: [phone]  Fax: [phone]  [email or web address]     Patient name: Imelda Kimble   YOB: 1975   MRN: [id number]    Date of discussion: 11/13/24    Thank you for referring Ms. Kimble to our care here at [facility]. Please allow us to summarize our review of records and recommendations.    The following disciplines were present for the discussion:   Colon and Rectal Surgery  Medical Oncology  Radiation Oncology  Pathology  Radiology    Endoscopy reviewed:   Date performed: 7/16/24  Flex sigmoidoscopy: anterior firm distal rectal mass, 2cm from AV    CT Chest, Abdomen and Pelvis   Date performed: 10/21/24   Performed at our facility: [facility]  Metastatic disease present: pulmonary nodules resolved or significantly decreased in size. Spiculated lingula reduced in size    PET-CT (07/19/2024):  Hypermetabolic soft tissue thickening along the rectal wall compatible with known malignancy.    There are a few prominent perirectal mesorectal lymph nodes without significant hypermetabolic activity, please note some may be too small for characterization by PET.     0.9 cm lingular pulmonary nodule with mild hypermetabolic activity and prominent hypermetabolic mediastinal lymph node, concerning for metastatic foci.    MRI Rectal Cancer Protocol  Date performed: 7/15/24   Performed at our facility: Yes  Tumor location in the rectum: Lower (0-5 cm)  Sphincter involvement: Invades internal sphincter only  Pretreatment circumferential resection margin status: Threatened (<2 mm)    MRI Rectal (10/31/24): probable incomplete response, no visible mesorectal nodes    Pathology reviewed:   Date pathology finalized: 6/21/24  Yes, report only     Pre-treatment CEA:  Date performed: 7/16/24  CEA Level: 2.9    CEA Trend: 2.7 -> 2.3 -> 2.5 (11/5/24)    Pre-treatment  Clinical Stage:  T2  N+ - Blayne disease is suspected  M1 - Metastasis suspected or confirmed    43 y/o F with metastatic and locally advanced rectal adenocarcinoma, pMMR, now s/p induction chemotherapy.    Discussed at prior [facility] on 7/24/24 with recommendations for DAVE (long course RT) and restaging.     Discussing restaging imaging following induction chemotherapy.       Based on our review of the current information we have made the following recommendations:    This patient had not yet had any radiation - will be seen by Dr. Lopez for planned short course RT. This will be followed by restaging and follow-up with Dr. Menendez.    Referrals  Radiation Oncology              Genetics    Please do not hesitate to contact us for any questions.

## 2024-11-13 ENCOUNTER — TUMOR BOARD CONFERENCE (OUTPATIENT)
Dept: SURGERY | Facility: HOSPITAL | Age: 49
End: 2024-11-13
Payer: COMMERCIAL

## 2024-11-13 DIAGNOSIS — C20 RECTAL CANCER: Primary | ICD-10-CM

## 2024-11-14 ENCOUNTER — OFFICE VISIT (OUTPATIENT)
Dept: HEMATOLOGY/ONCOLOGY | Facility: CLINIC | Age: 49
End: 2024-11-14
Payer: COMMERCIAL

## 2024-11-14 ENCOUNTER — LAB VISIT (OUTPATIENT)
Dept: LAB | Facility: HOSPITAL | Age: 49
End: 2024-11-14
Attending: STUDENT IN AN ORGANIZED HEALTH CARE EDUCATION/TRAINING PROGRAM
Payer: COMMERCIAL

## 2024-11-14 ENCOUNTER — PATIENT MESSAGE (OUTPATIENT)
Dept: HEMATOLOGY/ONCOLOGY | Facility: CLINIC | Age: 49
End: 2024-11-14
Payer: COMMERCIAL

## 2024-11-14 DIAGNOSIS — C20 RECTAL CANCER: ICD-10-CM

## 2024-11-14 DIAGNOSIS — Z71.83 ENCOUNTER FOR NONPROCREATIVE GENETIC COUNSELING AND TESTING: ICD-10-CM

## 2024-11-14 DIAGNOSIS — Z13.71 ENCOUNTER FOR NONPROCREATIVE GENETIC COUNSELING AND TESTING: Primary | ICD-10-CM

## 2024-11-14 DIAGNOSIS — Z71.83 ENCOUNTER FOR NONPROCREATIVE GENETIC COUNSELING AND TESTING: Primary | ICD-10-CM

## 2024-11-14 DIAGNOSIS — Z13.71 ENCOUNTER FOR NONPROCREATIVE GENETIC COUNSELING AND TESTING: ICD-10-CM

## 2024-11-14 LAB — MISCELLANEOUS TEST NAME: NORMAL

## 2024-11-14 PROCEDURE — 36415 COLL VENOUS BLD VENIPUNCTURE: CPT | Performed by: STUDENT IN AN ORGANIZED HEALTH CARE EDUCATION/TRAINING PROGRAM

## 2024-11-14 PROCEDURE — 99999 PR PBB SHADOW E&M-EST. PATIENT-LVL III: CPT | Mod: PBBFAC,,,

## 2024-11-14 NOTE — PROGRESS NOTES
"Cancer Genetics  Hereditary and High-Risk Clinic  Department of Hematology and Oncology  [facility]    [facility]    Date of Service:  24  Visit Provider:  Stepan Perales, [facility] [place] [facility] [place]  Collaborating Physician:  Yan Musa MD    Patient ID  Name: Imelda Kimble    : 1975    MRN: [id number]      Referring Provider:   Rajan Díaz MD  [address] [place] [address]    Visit Information  The patient location is:  [facility].    The chief complaint leading to consultation is:  As below.    Visit type: in-person  Face-to-face time with patient:  Approximately 55 minutes.    Approximately 90 minutes in total were spent on this encounter, which includes face-to-face time and non-face-to-face time preparing to see the patient (e.g., review of tests), obtaining and/or reviewing separately obtained history, documenting clinical information in the electronic or other health record, independently interpreting results (not separately reported) and communicating results to the patient/family/caregiver, or coordinating care (not separately reported).      JESSI Kimble is a 49 y.o. yo female who was referred to genetic counseling due to her diagnosis of rectal cancer, for which she meets NCCN guidelines for testing (diagnosis of rectal cancer before 50 years old). A blood sample will be submitted to [employer] on 24 for the CancerNext panel, along with insurance information. Results will be available within 2-3 weeks of sample submission.    SUBJECTIVE      Chief Complaint: Genetic evaluation (rectal cancer)    History of Present Illness (HPI):  Imelda Kimble ("Imelda"), 49 y.o., assigned female sex at birth, is established with the [facility] but new to me.  She was referred by Dr. Rajan Díaz (Colon and Rectal Surgery) for hereditary cancer risk assessment given her diagnosis of rectal cancer.    Age:  " "49 y.o.   Race and ethnicity:  White, Not  or /a  Weight:  222 lb  Height:  5'7"  Previous germline cancer genetic testing:  No    Cancer History  Oncology History   Rectal adenocarcinoma   6/21/2024 Procedure    Colonoscopy - mild diverticular disease, mass in distal rectum (Complete)     6/21/2024 Cancer Staged    Staging form: Colon and Rectum, AJCC 8th Edition  - Clinical stage from 6/21/2024: Stage NAVID (cT2, cN2b, cM1a)     6/24/2024 Initial Diagnosis    Rectal adenocarcinoma  ERROL  - Colonoscopy 6/21/24: infiltrative, ulcerated, sub-mucosal, fungating and villous bleeding mass found in distal rectum, 6-7cm from the anus proximally.   - Distal rectal mass biopsy 6/21/24: well differentiated adenocarcinoma, negative for LVI and perineural invasion. IHC MSI-H low probability with intact nuclear expressions of MLH1, MSH2, MSH6, and PMS2.        7/15/2024 Imaging Significant Findings    MRI Rectal cancer  Low rectal tumor invading the internal sphincter in close approximation to the mesorectal fascia without definite involvement.  Multiple suspicious mesorectal and superior rectal nodes.     *MR STAGE     T: T1/T2 (tumor confined to rectal wall)     N: N+ (short axis ? 9 mm)     Blayne statement: Multiple suspicious mesorectal and superior rectal nodes.     *MRF: threatened (tumor margin within 1-2 mm of MRF) noting this is a low rectal tumor.  No definite evidence of invasion.     Sphincter Involvement: Yes involvement the internal sphincter     Suspicious Extramesorectal Lymph Nodes: No     EMVI: No     7/16/2024 Tumor Markers    Patient's tumor was tested for the following markers: CEA                                              Results of the tumor marker test revealed 2.9     7/17/2024 Imaging Significant Findings    CT CAP  In this patient with a known history of rectal malignancy, there are few borderline prominent lymph nodes adjacent to the rectum measuring up to 8 mm.  There are solid nodules " in both lungs, the largest in the lingula measuring up to 1 cm.  Given the patient's primary malignancy, pulmonary metastatic disease cannot be excluded.  Follow-up based upon oncologic guidance.     Prominent soft tissue density in the anterior superior mediastinum which could possibly represent residual thymic tissue with a focal solid structure measuring up to 1.0 x 1.7 cm, possibly an enlarged lymph node versus other soft tissue mass.     Indeterminate focal regions of sclerosis within the T10 vertebra and right sacrum.  Osseous metastatic disease cannot be excluded.     Cholelithiasis without CT evidence for cholecystitis.     Large hiatal hernia.  Diverticulosis coli without diverticulitis.     Large left UPJ stone measuring up to 1.2 cm without hydronephrosis or hydroureter.     7/19/2024 Imaging Significant Findings    PET CT  Hypermetabolic soft tissue thickening along the rectal wall compatible with known malignancy.  There are a few prominent perirectal mesorectal lymph nodes without significant hypermetabolic activity, please note some may be too small for characterization by PET.     0.9 cm lingular pulmonary nodule with mild hypermetabolic activity and prominent hypermetabolic mediastinal lymph node, concerning for metastatic foci.     8/12/2024 -  Chemotherapy    Treatment Summary   Plan Name: OP GI [id number] (oxaliplatin leucovorin fluorouracil) with bevacizumab Q2W  Treatment Goal: Palliative  Status: Active  Start Date: 8/12/2024  End Date: 7/17/2025 (Planned)  Provider: Dion Rudolph MD  Chemotherapy: oxaliplatin (ELOXATIN) 85 mg/m2 = 194 mg in D5W 603.8 mL chemo infusion, 85 mg/m2 = 194 mg, Intravenous, Clinic/HOD 1 time, 4 of 4 cycles  Administration: 194 mg (8/12/2024), 194 mg (8/28/2024), 194 mg (9/12/2024), 194 mg (9/25/2024)  fluorouracil (Adrucil) 2,400 mg/m2 = 5,470 mg in 0.9% NaCl 240 mL chemo infusion, 2,400 mg/m2 = 5,470 mg, Intravenous, Clinic/HOD 1 time, 6 of 24  cycles  Administration: 5,470 mg (8/12/2024), 5,470 mg (8/28/2024), 5,470 mg (9/12/2024), 5,470 mg (9/25/2024), 5,470 mg (10/23/2024), 5,470 mg (11/6/2024)  bevacizumab-awwb (MVASI) 500 mg in 0.9% NaCl 100 mL infusion, 550 mg, Intravenous, Clinic/HOD 1 time, 6 of 24 cycles  Administration: 500 mg (8/12/2024), 500 mg (8/28/2024), 500 mg (9/12/2024), 500 mg (9/25/2024), 500 mg (10/23/2024), 500 mg (11/6/2024)       Masses/tumors/lesions   History of uterine fibroids     Breast History  Most recent mammogram: January 2024   Mammographic breast density:  N/A  Breast MRI:  No  Breast biopsy history and findings:  N/A  Thoracic radiation therapy history:  N/A    Gynecologic History  Age at menarche:  12y  Age at first live childbirth: nulliparous  Menopausal status:  postmenopausal  Reproductive organs: s/p complete hysterectomy at 44y    Hormone Use  Estrogen/Progesterone: total usage 3-5 years: BioTE, estrogen patches, now on Evamist  OCPs: 3 months   Testosterone: N/A    GI History  Upper GI screening: No  Most recent colonoscopy: 7/16/24 - rectal mass, malignant tumor in the distal rectum   Colon polyp: No  Pancreatitis:  No    Dermatologic History  No issues reported  Abnormal moles/lesions: Never   Skin cancer screening: Yes    Focused Social History  Tobacco Use: Low Risk  (11/5/2024)    Patient History     Smoking Tobacco Use: Never     Smokeless Tobacco Use: Never     Passive Exposure: Not on file     Review of Systems   Patient's Distress Score today was 6/10 (with 10 being the worst).  Patient attributes this to work stress and cancer diagnosis. Patient denies experiencing suicidal or homicidal ideations (SI/HI).  Offered patient a referral to  mental health services , and patient declines.     FAMILY HISTORY      ONCOLOGY PEDIGREE  Ashkenazi Episcopal:  No  Consanguinity:  No  Hereditary cancer genetic testing in blood relatives:  No    Family Cancer Pedigree:  Pedigree Image    Imelda reported her  family history  of cancer as follows:   Maternal  Aunt (60y) diagnosed with kidney cancer at 59y  Uncle diagnosed with throat cancer in late 20s - early 30s  Uncle () diagnosed with esophageal caner   Uncle ( at 47y diagnosed with recurrent skin cancer (possibly melanoma)  Uncle () diagnosed with squamous cell lung carcinoma, this relative was reportedly a smoker     Paternal  No known cancer history      COUNSELING      Pre-test cancer genetic counseling was conducted.        Causes of Cancer:  Cancer occurs when cells grow out of control. Some genes help protect against cancer by controlling the growth of cells. However, mutations (problems) in these genes can prevent them from working properly, increasing the risk of developing cancer.  Sporadic Cancer: Most people who get cancer have sporadic cancer. Sporadic cancer is caused by mutations that occur during the lifetime. These mutations may be caused by aging, environmental exposures (ex. UV radiation, carcinogens), lifestyle (ex. smoking, drinking alcohol, sunbathing, poor diet), other medical conditions (ex. hepatitis, HPV, ulcerative colitis), or other factors.   Hereditary Cancer: A small percentage (5-10%) of people who develop cancer were born with a mutation already in one of the cancer protection genes.  Familial Cancer: Cancer can also cluster in families that do not have an identifiable mutation. This may be due to shared environmental or lifestyle factors or genetic risk factors that have not been identified or cannot be detected using current technology or panels.     The likelihood of having a hereditary cancer risk depends on many factors including who in the family had cancer, what type of cancer they had, their age at diagnosis, cancer specifics (such as MMR status of an endometrial or colon cancer or type of breast cancer), and previous genetic testing in the family. Typically, the chance is higher for families that have multiple people  with the same or related cancers, an individual with multiple cancers, younger ages of cancer, and certain types of cancer (such as pancreatic or triple negative breast cancer).      Possible Results:    Positive (pathogenic or likely pathogenic variant): A genetic variant was found that is suspected or known to impact the function of the gene. The impact of a positive result on an individual's risk of cancer varies based on the gene, specific variant, individual's sex assigned at birth, personal cancer history, other health history (such as surgical history), and family history. A positive result can impact screening and risk management recommendations. However, there are not always available guidelines for management based on a specific gene variant. Family history and personal risk factors should always be considered. Sometimes, a positive result can also have treatment or reproductive implications.   Negative: No clinically significant variants were reported in the tested genes. A negative result does not indicate that an individual cannot develop cancer or even that the individual is at average risk. An individual may still be at an increased risk for cancer based on personal risk factors or family history. Additionally, there could be a hereditary cancer predisposition that was not included in a chosen panel or is not detected with current technology.   Variant of Uncertain Significance (VUS): A variant was found. However, the lab does not have enough information to determine if the variant is benign (harmless) or pathogenic (impacts the function of the gene). The laboratory may update (reclassify) the variant over time as more information becomes available. When reclassified, most variants of uncertain significance are reclassified to benign/likely benign. Typically, it is not recommended to  based on the presence of a VUS. The chance of finding a VUS varies based on the test performed.  Generally, the chance of finding a VUS increases with the number of genes tested and decreases with the amount of testing of that gene (genes that are tested more frequently or for a longer period of time have a lower VUS rate).     Genetic Mutation Inheritance:  When an individual has a gene mutation, their first-degree relatives (parents, children, and siblings) each have a 50% chance of carrying the same mutation. Other, more distant blood relatives can also be at risk of carrying the same mutation. At-risk relatives of an individual with a mutation should consider genetic testing to help determine their risk for cancer.     Genetic Discrimination: The Genetic Information Nondiscrimination Act of 2008 (KEVIN) is a [place] federal law that provides some protections against the use of an individual's genetic information by their health insurer and by their employer. Title I of [place] prohibits most health insurers (except for insurance obtained through a job with the  or the Federal Employees Health Benefits Plan) from utilizing an individual's genetic information to make decisions regarding insurance eligibility or premium charges. Title II of KEVIN prohibits covered entities from requesting or requiring the genetic information of employees and applicants and from using said information to make employment decisions. This does not apply to employers with fewer than 15 employees or to the .  KEVIN also does not protect individuals from genetic discrimination by any other type of policy or entity, including but not limited to life insurance, disability insurance, long-term care insurance,  benefits, and [facility] benefits.    Genetic Testing Options:   Various genetic testing panel options were discussed along with associated benefits, limitations, and risks.   There are several issues to consider regarding multi-gene testing:  Insurance coverage can vary depending on the genetic test  panel(s) ordered.  There are limited data and a lack of clear guidelines regarding degree of cancer risk associated with some of the genes assessed and how to communicate and manage risk for carriers of these genes; this issue is compounded by the low incidence rates of hereditary disease; multi-gene tests include moderate-penetrance genes, and they often also include low-penetrance genes for which there are little available data regarding degree of cancer risk and guidelines for risk management.  Increased likelihood of detecting a genetic variant of unknown significance (VUS).  Increased chance of finding genotypically distinct cell lines (i.e., genetic mosaicism) with next-generation sequencing; clones of non-cancerous cell containing certain genetic mutations have been found in healthy adults undergoing multi-gene testing; this phenomenon can often be attributed to clonal hematopoiesis, a condition in which a hematopoietic stem cell begins making blood cells with the same acquired mutation.    The option for DNA only vs DNA+RNA was discussed. Adding RNA has a small chance of helping to clarify a VUS or detecting genetic variants that DNA only cannot (deep intronic variants). However, it must be conducted on a blood sample (not saliva).     Genetic Testing Guidelines: Imelda's reported personal history meets the genetic testing criteria established by the National Comprehensive Cancer Network Genetic/Familial High-Risk Assessment: Colorectal version 1.2024.  Therefore, Imelda was offered germline hereditary cancer genetic testing. Imelda decided to proceed with testing after a discussion regarding various genetic testing panels that could be performed as well as associated risks. Imelda has provided informed consent.   ASSESSMENT / PLAN      Genetic Testing Logistics:  An outside laboratory performs this genetic testing. Genetic testing typically takes 2-3 weeks to after the sample has been received.  There is a  potential for the patient to have out-of-pocket costs related to genetic testing.  Post-test genetic counseling can be conducted once the genetic testing results are available.    Genetic Test Information:  Testing lab: [facility]   Test panel: CancerNext (+RNA insight)    ICD-10 code(s): C20   Verbal informed consent: Obtained   Specimen type: Blood  (Patient denies blood disorders that would necessitate a skin fibroblast specimen)   Specimen collection by: [facility]   Specimen collection date: 11/14/24   Results expected by: Approximately 2-3 weeks after the genetic testing lab receives the specimen   Results disclosure plan: Post-test visit if positive or complex result; otherwise, results will be communicated by phone call      Follow-up: Post-test genetic counseling will be conducted once the genetic testing results are available.    Imelda appeared to have a good understanding of the information. Questions were encouraged and answered to the patient's satisfaction, and she verbalized understanding of the information and agreement with the plan.   Imelda is welcome to contact me if she has any questions, concerns, or updates to her family history.     This assessment is based on the history and reports provided, as well as the current scientific knowledge regarding cancer genetics.     Stepan Perales, MGC, [facility] [place]  Certified Genetic Counselor  Department of Hematology and Oncology  [facility]    CC:  Dr. Rajan Díaz

## 2024-11-15 ENCOUNTER — PATIENT MESSAGE (OUTPATIENT)
Dept: HEMATOLOGY/ONCOLOGY | Facility: CLINIC | Age: 49
End: 2024-11-15
Payer: COMMERCIAL

## 2024-11-18 ENCOUNTER — LAB VISIT (OUTPATIENT)
Dept: LAB | Facility: HOSPITAL | Age: 49
End: 2024-11-18
Attending: INTERNAL MEDICINE
Payer: COMMERCIAL

## 2024-11-18 DIAGNOSIS — C20 RECTAL ADENOCARCINOMA: ICD-10-CM

## 2024-11-18 DIAGNOSIS — C20 RECTAL CANCER: ICD-10-CM

## 2024-11-18 LAB
ALBUMIN SERPL BCP-MCNC: 3.8 G/DL (ref 3.5–5.2)
ALP SERPL-CCNC: 140 U/L (ref 40–150)
ALT SERPL W/O P-5'-P-CCNC: 37 U/L (ref 10–44)
ANION GAP SERPL CALC-SCNC: 11 MMOL/L (ref 8–16)
AST SERPL-CCNC: 25 U/L (ref 10–40)
BACTERIA #/AREA URNS HPF: ABNORMAL /HPF
BILIRUB SERPL-MCNC: 0.4 MG/DL (ref 0.1–1)
BILIRUB UR QL STRIP: NEGATIVE
BUN SERPL-MCNC: 14 MG/DL (ref 6–20)
CALCIUM SERPL-MCNC: 10 MG/DL (ref 8.7–10.5)
CEA SERPL-MCNC: 2.4 NG/ML (ref 0–5)
CHLORIDE SERPL-SCNC: 102 MMOL/L (ref 95–110)
CLARITY UR: ABNORMAL
CO2 SERPL-SCNC: 29 MMOL/L (ref 23–29)
COLOR UR: YELLOW
CREAT SERPL-MCNC: 0.8 MG/DL (ref 0.5–1.4)
ERYTHROCYTE [DISTWIDTH] IN BLOOD BY AUTOMATED COUNT: 17.1 % (ref 11.5–14.5)
EST. GFR  (NO RACE VARIABLE): >60 ML/MIN/1.73 M^2
GLUCOSE SERPL-MCNC: 98 MG/DL (ref 70–110)
GLUCOSE UR QL STRIP: NEGATIVE
HCT VFR BLD AUTO: 39.5 % (ref 37–48.5)
HGB BLD-MCNC: 13.2 G/DL (ref 12–16)
HGB UR QL STRIP: ABNORMAL
HYALINE CASTS #/AREA URNS LPF: 0 /LPF
IMM GRANULOCYTES # BLD AUTO: 0.01 K/UL (ref 0–0.04)
KETONES UR QL STRIP: NEGATIVE
LEUKOCYTE ESTERASE UR QL STRIP: ABNORMAL
MCH RBC QN AUTO: 28 PG (ref 27–31)
MCHC RBC AUTO-ENTMCNC: 33.4 G/DL (ref 32–36)
MCV RBC AUTO: 84 FL (ref 82–98)
MICROSCOPIC COMMENT: ABNORMAL
NEUTROPHILS # BLD AUTO: 4.6 K/UL (ref 1.8–7.7)
NITRITE UR QL STRIP: POSITIVE
PH UR STRIP: 6 [PH] (ref 5–8)
PLATELET # BLD AUTO: 232 K/UL (ref 150–450)
PMV BLD AUTO: 10.7 FL (ref 9.2–12.9)
POTASSIUM SERPL-SCNC: 4.6 MMOL/L (ref 3.5–5.1)
PROT SERPL-MCNC: 7.2 G/DL (ref 6–8.4)
PROT UR QL STRIP: ABNORMAL
RBC # BLD AUTO: 4.71 M/UL (ref 4–5.4)
RBC #/AREA URNS HPF: 3 /HPF (ref 0–4)
SODIUM SERPL-SCNC: 142 MMOL/L (ref 136–145)
SP GR UR STRIP: 1.02 (ref 1–1.03)
SQUAMOUS #/AREA URNS HPF: 4 /HPF
URN SPEC COLLECT METH UR: ABNORMAL
UROBILINOGEN UR STRIP-ACNC: NEGATIVE EU/DL
WBC # BLD AUTO: 7.66 K/UL (ref 3.9–12.7)
WBC #/AREA URNS HPF: >100 /HPF (ref 0–5)
WBC CLUMPS URNS QL MICRO: ABNORMAL

## 2024-11-18 PROCEDURE — 85027 COMPLETE CBC AUTOMATED: CPT | Performed by: INTERNAL MEDICINE

## 2024-11-18 PROCEDURE — 36415 COLL VENOUS BLD VENIPUNCTURE: CPT | Performed by: INTERNAL MEDICINE

## 2024-11-18 PROCEDURE — 82378 CARCINOEMBRYONIC ANTIGEN: CPT | Performed by: INTERNAL MEDICINE

## 2024-11-18 PROCEDURE — 81000 URINALYSIS NONAUTO W/SCOPE: CPT | Performed by: INTERNAL MEDICINE

## 2024-11-18 PROCEDURE — 80053 COMPREHEN METABOLIC PANEL: CPT | Performed by: INTERNAL MEDICINE

## 2024-11-19 ENCOUNTER — OFFICE VISIT (OUTPATIENT)
Dept: HEMATOLOGY/ONCOLOGY | Facility: CLINIC | Age: 49
End: 2024-11-19
Payer: COMMERCIAL

## 2024-11-19 ENCOUNTER — PATIENT MESSAGE (OUTPATIENT)
Dept: HEMATOLOGY/ONCOLOGY | Facility: CLINIC | Age: 49
End: 2024-11-19

## 2024-11-19 DIAGNOSIS — Z79.899 IMMUNODEFICIENCY SECONDARY TO CHEMOTHERAPY: ICD-10-CM

## 2024-11-19 DIAGNOSIS — I10 HYPERTENSION, UNSPECIFIED TYPE: ICD-10-CM

## 2024-11-19 DIAGNOSIS — T45.1X5A IMMUNODEFICIENCY SECONDARY TO CHEMOTHERAPY: ICD-10-CM

## 2024-11-19 DIAGNOSIS — C78.02 MALIGNANT NEOPLASM METASTATIC TO LEFT LUNG: ICD-10-CM

## 2024-11-19 DIAGNOSIS — D50.9 MICROCYTIC ANEMIA: ICD-10-CM

## 2024-11-19 DIAGNOSIS — N20.0 NEPHROLITHIASIS: ICD-10-CM

## 2024-11-19 DIAGNOSIS — E78.5 HYPERLIPIDEMIA, UNSPECIFIED HYPERLIPIDEMIA TYPE: ICD-10-CM

## 2024-11-19 DIAGNOSIS — J30.9 ALLERGIC RHINITIS, UNSPECIFIED SEASONALITY, UNSPECIFIED TRIGGER: ICD-10-CM

## 2024-11-19 DIAGNOSIS — F41.1 GENERALIZED ANXIETY DISORDER: ICD-10-CM

## 2024-11-19 DIAGNOSIS — K58.1 IRRITABLE BOWEL SYNDROME WITH CONSTIPATION: ICD-10-CM

## 2024-11-19 DIAGNOSIS — D84.821 IMMUNODEFICIENCY SECONDARY TO CHEMOTHERAPY: ICD-10-CM

## 2024-11-19 DIAGNOSIS — C20 RECTAL CANCER: Primary | ICD-10-CM

## 2024-11-19 DIAGNOSIS — D84.81 IMMUNODEFICIENCY SECONDARY TO NEOPLASM: ICD-10-CM

## 2024-11-19 DIAGNOSIS — D49.9 IMMUNODEFICIENCY SECONDARY TO NEOPLASM: ICD-10-CM

## 2024-11-19 PROCEDURE — 99215 OFFICE O/P EST HI 40 MIN: CPT | Mod: 95,,, | Performed by: REGISTERED NURSE

## 2024-11-19 PROCEDURE — G2211 COMPLEX E/M VISIT ADD ON: HCPCS | Mod: 95,,, | Performed by: REGISTERED NURSE

## 2024-11-19 PROCEDURE — 1159F MED LIST DOCD IN RCRD: CPT | Mod: CPTII,95,, | Performed by: REGISTERED NURSE

## 2024-11-19 PROCEDURE — 1160F RVW MEDS BY RX/DR IN RCRD: CPT | Mod: CPTII,95,, | Performed by: REGISTERED NURSE

## 2024-11-19 RX ORDER — SODIUM CHLORIDE 0.9 % (FLUSH) 0.9 %
10 SYRINGE (ML) INJECTION
Status: CANCELLED | OUTPATIENT
Start: 2024-11-21

## 2024-11-19 RX ORDER — DIPHENHYDRAMINE HYDROCHLORIDE 50 MG/ML
50 INJECTION INTRAMUSCULAR; INTRAVENOUS ONCE AS NEEDED
Status: CANCELLED | OUTPATIENT
Start: 2024-11-19

## 2024-11-19 RX ORDER — PROCHLORPERAZINE EDISYLATE 5 MG/ML
10 INJECTION INTRAMUSCULAR; INTRAVENOUS ONCE AS NEEDED
Status: CANCELLED | OUTPATIENT
Start: 2024-11-19

## 2024-11-19 RX ORDER — HEPARIN 100 UNIT/ML
500 SYRINGE INTRAVENOUS
Status: CANCELLED | OUTPATIENT
Start: 2024-11-19

## 2024-11-19 RX ORDER — EPINEPHRINE 0.3 MG/.3ML
0.3 INJECTION SUBCUTANEOUS ONCE AS NEEDED
Status: CANCELLED | OUTPATIENT
Start: 2024-11-19

## 2024-11-19 RX ORDER — SODIUM CHLORIDE 0.9 % (FLUSH) 0.9 %
10 SYRINGE (ML) INJECTION
Status: CANCELLED | OUTPATIENT
Start: 2024-11-19

## 2024-11-19 RX ORDER — PROCHLORPERAZINE EDISYLATE 5 MG/ML
10 INJECTION INTRAMUSCULAR; INTRAVENOUS ONCE AS NEEDED
Status: CANCELLED | OUTPATIENT
Start: 2024-11-21

## 2024-11-19 RX ORDER — HEPARIN 100 UNIT/ML
500 SYRINGE INTRAVENOUS
Status: CANCELLED | OUTPATIENT
Start: 2024-11-21

## 2024-11-19 NOTE — PROGRESS NOTES
The patient location is: [place], [place]  The chief complaint leading to consultation is: treatment follow up, lab review, chemo clearance     Visit type: audiovisual    Each patient to whom he or she provides medical services by telemedicine is:  (1) informed of the relationship between the physician and patient and the respective role of any other health care provider with respect to management of the patient; and (2) notified that he or she may decline to receive medical services by telemedicine and may withdraw from such care at any time.    Notes:     MEDICAL ONCOLOGY - ESTABLISHED PATIENT VISIT    Reason for visit: rectal cancer     Best Contact Phone Number(s): [phone] (home)      Cancer/Stage/TNM:    Cancer Staging   Rectal adenocarcinoma  Staging form: Colon and Rectum, AJCC 8th Edition  - Clinical stage from 6/21/2024: Stage NAVID (cT2, cN2b, cM1a) - Signed by Dion Rudolph MD on 7/24/2024  - Pathologic: No stage assigned - Unsigned       Oncology History   Rectal adenocarcinoma   6/21/2024 Procedure    Colonoscopy - mild diverticular disease, mass in distal rectum (Complete)     6/21/2024 Cancer Staged    Staging form: Colon and Rectum, AJCC 8th Edition  - Clinical stage from 6/21/2024: Stage NAVID (cT2, cN2b, cM1a)     6/24/2024 Initial Diagnosis    Rectal adenocarcinoma  ERROL  - Colonoscopy 6/21/24: infiltrative, ulcerated, sub-mucosal, fungating and villous bleeding mass found in distal rectum, 6-7cm from the anus proximally.   - Distal rectal mass biopsy 6/21/24: well differentiated adenocarcinoma, negative for LVI and perineural invasion. IHC MSI-H low probability with intact nuclear expressions of MLH1, MSH2, MSH6, and PMS2.        7/15/2024 Imaging Significant Findings    MRI Rectal cancer  Low rectal tumor invading the internal sphincter in close approximation to the mesorectal fascia without definite involvement.  Multiple suspicious mesorectal and superior rectal nodes.      *MR STAGE     T: T1/T2 (tumor confined to rectal wall)     N: N+ (short axis ? 9 mm)     Blayne statement: Multiple suspicious mesorectal and superior rectal nodes.     *MRF: threatened (tumor margin within 1-2 mm of MRF) noting this is a low rectal tumor.  No definite evidence of invasion.     Sphincter Involvement: Yes involvement the internal sphincter     Suspicious Extramesorectal Lymph Nodes: No     EMVI: No     7/16/2024 Tumor Markers    Patient's tumor was tested for the following markers: CEA                                              Results of the tumor marker test revealed 2.9     7/17/2024 Imaging Significant Findings    CT CAP  In this patient with a known history of rectal malignancy, there are few borderline prominent lymph nodes adjacent to the rectum measuring up to 8 mm.  There are solid nodules in both lungs, the largest in the lingula measuring up to 1 cm.  Given the patient's primary malignancy, pulmonary metastatic disease cannot be excluded.  Follow-up based upon oncologic guidance.     Prominent soft tissue density in the anterior superior mediastinum which could possibly represent residual thymic tissue with a focal solid structure measuring up to 1.0 x 1.7 cm, possibly an enlarged lymph node versus other soft tissue mass.     Indeterminate focal regions of sclerosis within the T10 vertebra and right sacrum.  Osseous metastatic disease cannot be excluded.     Cholelithiasis without CT evidence for cholecystitis.     Large hiatal hernia.  Diverticulosis coli without diverticulitis.     Large left UPJ stone measuring up to 1.2 cm without hydronephrosis or hydroureter.     7/19/2024 Imaging Significant Findings    PET CT  Hypermetabolic soft tissue thickening along the rectal wall compatible with known malignancy.  There are a few prominent perirectal mesorectal lymph nodes without significant hypermetabolic activity, please note some may be too small for characterization by PET.     0.9 cm  lingular pulmonary nodule with mild hypermetabolic activity and prominent hypermetabolic mediastinal lymph node, concerning for metastatic foci.     8/12/2024 -  Chemotherapy    Treatment Summary   Plan Name: OP GI mFOLFOX6 (oxaliplatin leucovorin fluorouracil) with bevacizumab Q2W  Treatment Goal: Palliative  Status: Active  Start Date: 8/12/2024  End Date: 7/17/2025 (Planned)  Provider: Dion Rudolph MD  Chemotherapy: oxaliplatin (ELOXATIN) 85 mg/m2 = 194 mg in D5W 603.8 mL chemo infusion, 85 mg/m2 = 194 mg, Intravenous, Clinic/HOD 1 time, 4 of 4 cycles  Administration: 194 mg (8/12/2024), 194 mg (8/28/2024), 194 mg (9/12/2024), 194 mg (9/25/2024)  fluorouracil (Adrucil) 2,400 mg/m2 = 5,470 mg in 0.9% NaCl 240 mL chemo infusion, 2,400 mg/m2 = 5,470 mg, Intravenous, Clinic/HOD 1 time, 6 of 24 cycles  Administration: 5,470 mg (8/12/2024), 5,470 mg (8/28/2024), 5,470 mg (9/12/2024), 5,470 mg (9/25/2024), 5,470 mg (10/23/2024), 5,470 mg (11/6/2024)  bevacizumab-awwb (MVASI) 500 mg in 0.9% NaCl 100 mL infusion, 550 mg, Intravenous, Clinic/HOD 1 time, 6 of 24 cycles  Administration: 500 mg (8/12/2024), 500 mg (8/28/2024), 500 mg (9/12/2024), 500 mg (9/25/2024), 500 mg (10/23/2024), 500 mg (11/6/2024)        Interval History:   Ms. Segura returns prior to cycle 7 of FOLFOX + bevacizumab, now on maintenance 5-FU plus avastin. She is doing okay overall. She is curious about the tumor board recommendation and next steps. This last cycle was a little harder on her in terms of fatigue and nausea. Took her much longer to bounce back. She does report constipation which has been controlled.     Patient presents to virtual visit alone.  ECOG PS is 0.    ROS:   Review of Systems   Constitutional:  Positive for malaise/fatigue. Negative for fever and weight loss.   HENT:  Negative for hearing loss.    Eyes:  Negative for blurred vision.   Respiratory:  Negative for cough, hemoptysis and shortness of breath.    Cardiovascular:  " Negative for chest pain.   Gastrointestinal:  Positive for abdominal pain and nausea. Negative for blood in stool, constipation, diarrhea and vomiting.   Genitourinary:  Negative for dysuria, frequency and urgency.   Musculoskeletal:  Negative for back pain and myalgias.   Skin:  Negative for rash.   Neurological:  Negative for dizziness, tingling, weakness and headaches.   Endo/Heme/Allergies:  Does not bruise/bleed easily.   Psychiatric/Behavioral:  Negative for depression. The patient is not nervous/anxious.        Past Medical History:   Past Medical History:   Diagnosis Date    Anxiety disorder, unspecified     Fibroids     HTN (hypertension)     Sinus problem         Past Surgical History:   Past Surgical History:   Procedure Laterality Date    BILATERAL SALPINGO-OOPHORECTOMY (BSO) Bilateral 12/9/2020    Procedure: SALPINGO-OOPHORECTOMY, BILATERAL;  Surgeon: Yun Recio MD;  Location: [facility];  Service: OB/GYN;  Laterality: Bilateral;  COVID NEGATIVE 12/3    INCISION OF UVULA      INSERTION OF TUNNELED CENTRAL VENOUS CATHETER (CVC) WITH SUBCUTANEOUS PORT Right 7/29/2024    Procedure: INSERTION, SINGLE LUMEN CATHETER WITH PORT, WITH FLUOROSCOPIC GUIDANCE;  Surgeon: Russel Layton MD;  Location: [address];  Service: General;  Laterality: Right;    knee arhroscopy      NASAL SEPTUM SURGERY      SINUS SURGERY      TONSILLECTOMY      TOTAL ABDOMINAL HYSTERECTOMY N/A 12/9/2020    Procedure: HYSTERECTOMY, TOTAL, ABDOMINAL;  Surgeon: Yun Recio MD;  Location: [facility];  Service: OB/GYN;  Laterality: N/A;    UTERINE FIBROID SURGERY          Family History:   Family History   Problem Relation Name Age of Onset    Asthma Mother      Hypertension Mother      COPD Father      Diabetes Father      Hypertension Father      Esophageal cancer Maternal Uncle Jossue     Colon polyps Maternal Grandfather          "every colonoscopy"    Bone cancer Paternal Grandmother      Kidney cancer Maternal Aunt " Sherlyn 59        kidney removal    Skin cancer Maternal Uncle Tim         possibly melanoma  recurrent    Throat cancer Maternal Uncle Medardo         late 20s - early 30s    Lung cancer Maternal Uncle Maksim         squamous cell  smoke exposure    Leukemia Maternal Uncle Kendall     Colon cancer Neg Hx          Social History:   Social History     Tobacco Use    Smoking status: Never    Smokeless tobacco: Never   Substance Use Topics    Alcohol use: Yes     Comment: Socially        I have reviewed and updated the patient's past medical, surgical, family and social histories.    Allergies:   Review of patient's allergies indicates:  No Known Allergies     Medications:   Current Outpatient Medications   Medication Sig Dispense Refill    cetirizine (ZYRTEC) 10 MG tablet Take 10 mg by mouth daily as needed.       cholecalciferol, vitamin D3, 5,000 unit Tab Take 5,000 Units by mouth once daily.      citalopram (CELEXA) 20 MG tablet Take 20 mg by mouth every morning.      EVAMIST 1.53 mg/spray (1.7%) transdermal spray SPRAY 2 SPRAYS TO FOREARM DAILY      fluticasone (FLONASE) 50 mcg/actuation nasal spray 1 spray (50 mcg total) by Each Nare route once daily. (Patient not taking: Reported on 9/10/2024) 16 g 0    hydroCHLOROthiazide (HYDRODIURIL) 25 MG tablet Take 25 mg by mouth every morning.      LIDOcaine-prilocaine (EMLA) cream Apply topically as needed. 30 g 3    LORazepam (ATIVAN) 0.5 MG tablet Take 1 tablet (0.5 mg total) by mouth every 12 (twelve) hours as needed for Anxiety. 5 tablet 0    niacin 50 MG Tab niacin      niacin 50 MG Tab       omega 3-dha-epa-fish oil (FISH OIL) 1,000 mg (120 mg-180 mg) Cap Take by mouth once daily.      ondansetron (ZOFRAN-ODT) 8 MG TbDL Take 1 tablet (8 mg total) by mouth every 6 (six) hours as needed (chemotherapy induced nausea/vomiting). 60 tablet 4    rosuvastatin (CRESTOR) 10 MG tablet Take by mouth every evening.       No current facility-administered medications for this visit.       Physical Exam:   [facility] 11/18/2020          Physical Exam  Constitutional:       General: She is not in acute distress.     Appearance: Normal appearance. She is well-developed and normal weight. She is not ill-appearing, toxic-appearing or diaphoretic.      Comments: PE limited d/t nature of virtual visit   HENT:      Head: Normocephalic and atraumatic.      Right Ear: External ear normal.      Left Ear: External ear normal.   Eyes:      General: No scleral icterus.     Conjunctiva/sclera: Conjunctivae normal.   Pulmonary:      Effort: Pulmonary effort is normal. No respiratory distress.   Skin:     Capillary Refill: Capillary refill takes less than 2 seconds.   Neurological:      Mental Status: She is alert and oriented to person, place, and time.      Gait: Gait is intact.   Psychiatric:         Mood and Affect: Mood normal.         Behavior: Behavior normal.         Labs:   Recent Results (from the past 48 hours)   CBC Oncology    Collection Time: 11/18/24  9:18 AM   Result Value Ref Range    WBC 7.66 3.90 - 12.70 K/uL    RBC 4.71 4.00 - 5.40 M/uL    Hemoglobin 13.2 12.0 - 16.0 g/dL    Hematocrit 39.5 37.0 - 48.5 %    MCV 84 82 - 98 fL    MCH 28.0 27.0 - 31.0 pg    MCHC 33.4 32.0 - 36.0 g/dL    RDW 17.1 (H) 11.5 - 14.5 %    Platelets 232 150 - 450 K/uL    MPV 10.7 9.2 - 12.9 fL    Gran # (ANC) 4.6 1.8 - 7.7 K/uL    Immature Grans (Abs) 0.01 0.00 - 0.04 K/uL   Comprehensive Metabolic Panel    Collection Time: 11/18/24  9:18 AM   Result Value Ref Range    Sodium 142 136 - 145 mmol/L    Potassium 4.6 3.5 - 5.1 mmol/L    Chloride 102 95 - 110 mmol/L    CO2 29 23 - 29 mmol/L    Glucose 98 70 - 110 mg/dL    BUN 14 6 - 20 mg/dL    Creatinine 0.8 0.5 - 1.4 mg/dL    Calcium 10.0 8.7 - 10.5 mg/dL    Total Protein 7.2 6.0 - 8.4 g/dL    Albumin 3.8 3.5 - 5.2 g/dL    Total Bilirubin 0.4 0.1 - 1.0 mg/dL    Alkaline Phosphatase 140 40 - 150 U/L    AST 25 10 - 40 U/L    ALT 37 10 - 44 U/L    eGFR >60 >60 mL/min/1.73 m^2    Anion  Gap 11 8 - 16 mmol/L   CEA    Collection Time: 11/18/24  9:18 AM   Result Value Ref Range    CEA 2.4 0.0 - 5.0 ng/mL   Urinalysis    Collection Time: 11/18/24  9:19 AM   Result Value Ref Range    Specimen UA Urine, Clean Catch     Color, UA Yellow Yellow, Straw, Norma    Appearance, UA Hazy (A) Clear    pH, UA 6.0 5.0 - 8.0    Specific Gravity, UA 1.020 1.005 - 1.030    Protein, UA 1+ (A) Negative    Glucose, UA Negative Negative    Ketones, UA Negative Negative    Bilirubin (UA) Negative Negative    Occult Blood UA Trace (A) Negative    Nitrite, UA Positive (A) Negative    Urobilinogen, UA Negative <2.0 EU/dL    Leukocytes, UA 2+ (A) Negative   Urinalysis Microscopic    Collection Time: 11/18/24  9:19 AM   Result Value Ref Range    RBC, UA 3 0 - 4 /hpf    WBC, UA >100 (H) 0 - 5 /hpf    WBC Clumps, UA Occasional (A) None-Rare    Bacteria Many (A) None-Occ /hpf    Squam Epithel, UA 4 /hpf    Hyaline Casts, UA 0 0-1/lpf /lpf    Microscopic Comment SEE COMMENT      Labs reviewed.     Imaging:      MRI 10/31/2024:     Impression:     Motion degraded sequences.     *TUMOR     Compared to MRI 07/15/2024, post treatment primary tumor assessment: Probable Incomplete response (likely residual tumor)     Improving heterogeneous signal and wall thickening of the rectum with some residual restricted diffusion which suggests residual tumor.  Improvement of the size of multiple mesorectal lymph nodes.     *NODES     Suspicious Mesorectal Lymph nodes: No     Suspicious Extramesorectal Lymph nodes: No    CT CAP - 10/21/24:    Impression:     In this patient with a known history of rectal malignancy, at the perirectal lymph nodes have significantly decreased in size, for example the 8 mm lymph node now measures 4 mm.  Additionally, the pulmonary nodules are also either resolved or significantly decreased in size.  For example the spiculated nodule in the lingula previously measured 1 cm and currently measures 0.5 cm.     Soft tissue  density nodule in the anterior mediastinum measures 1.7 cm, stable.     Focal regions of sclerosis in the right roxann sacrum and T10 vertebra, stable and remaining indeterminate.     Cholelithiasis.     Large hiatal hernia.     Interval placement of a left ureteral stent      Path:   Distal rectal mass biopsy 6/21/24: well differentiated adenocarcinoma, negative for LVI and perineural invasion. IHC MSI-H low probability with intact nuclear expressions of MLH1, MSH2, MSH6, and PMS2.         Assessment:       1. Rectal cancer    2. Malignant neoplasm metastatic to left lung    3. 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 Microcytic anemia    6. Nephrolithiasis    7. Hypertension, unspecified type    8. Hyperlipidemia, unspecified hyperlipidemia type    9. Allergic rhinitis, unspecified seasonality, unspecified trigger    10. Irritable bowel syndrome with constipation    11. Generalized anxiety disorder       Plan:         # Rectal adenocarcinoma  Imelda Kimble is a 49 y.o. woman with distal rectal G1 adenocarcinoma pMMR, 5.6 cm long, below the anterior peritoneal reflection, MRF threatened, with involvement the internal sphincter, EMVI-, with multiple (8+) small suspicious mesorectal and superior rectal nodes.     She was in her usual health and went to PCP for annual screening in 2024. PCP recommended screening colonoscopy as she is >45. 2 weeks prior to her screening colonoscopy, she noticed hematochezia and rectal pain. Her only symptom prior to this was constipation, which is lifelong. Found to have distal rectal tumor as above.    PET/CT 7/19/24 showed hypermetabolism of the primary rectal tumor with small mesorectal lymph nodes.  Additional 9mm L lung nodule and mediastinal lymph node were hypermetabolic.    We discussed her at tumor board with recommendations for induction chemotherapy and then potential local therapy (chemoradiation) to rectum and SBRT/surgery  to lung.    Had port placed on 7/29/24    Received cycle 1 FOLFOX + Avastin on 8/12/24. Ms. Segura returns prior to cycle 6 of FOLFOX + bevacizumab, now on maintenance 5-FU plus avastin. Last dose of Oxaliplatin was given on 9/25/2024  Repeat imaging after cycle 4 shows good response in mesorectal lymph nodes and L lingular lung metastasis.  Had restaging MRI of pelvis rectum that displays very good response to treatment. Reviewed by Dr Rudolph and Dr. Menendez and will discuss moving towards consolidative chemoradiation to her rectum. Will present at colorectal tumor board after MRI.    I have reviewed the CBC and CMP, adequate for treatment   UA stable. No urinary symptoms.   Proceed with cycle 7 of 5-FU + Avastin today.    Will message Dr. Lopez to discuss radiation plan.     RTC in 2 weeks. Pt would like to continue treatment in [place].     # Microcytic anemia, nephrolithiasis  Suspect MICHELLE.  Mild.  Monitor.    Doing better. Ureteral stent was removed on 10/28/2024. No urinary symptoms at this time.     Other chronic disease managed by PCP   # Generalized anxiety disorder - continue citalopram   # HTN - HCTZ   # HLD - statin  # allergic rhinitis - zyrtec and flonase, stable   # IBS-Constipation - Stable    Follow up: 2 weeks for cycle 8    Patient is in agreement with the proposed treatment plan. All questions were answered to the patient's satisfaction. Pt knows to call clinic if anything is needed before the next clinic visit.    Patient discussed with collaborating physician, Dr. Rudolph.    At least 40 minutes were spent today on this encounter including face to face time with the patient, data gathering/interpretation and documentation.       Eliane Pozo, MSN, APRN, ACCNS-AG  Hematology and Medical Oncology  Clinical Nurse Specialist to Dr. Rudolph, Dr. Vázquez & Dr. Holly    Route Chart for Scheduling    Med Onc Chart Routing      Follow up with physician 2 weeks and 6 weeks. with labs for chemo    Follow up with LEIDA 4 weeks. with labs for chemo   Infusion scheduling note   chemo every 2 weeks, pump d/c on day 3 - in [place]   Injection scheduling note    Labs CBC, CMP, CEA and urinalysis   Scheduling:  Preferred lab:  Lab interval: every 2 weeks     Imaging    Pharmacy appointment    Other referrals              Treatment Plan Information   OP GI mFOLFOX6 (oxaliplatin leucovorin fluorouracil) with bevacizumab Q2W Dion Rudolph MD   Associated diagnosis: Rectal adenocarcinoma Stage NAVID cT2, cN2b, cM1a noted on 7/15/2024   Line of treatment: First Line  Treatment Goal: Palliative     Upcoming Treatment Dates - OP GI mFOLFOX6 (oxaliplatin leucovorin fluorouracil) with bevacizumab Q2W    11/19/2024       Chemotherapy       bevacizumab-awwb (MVASI) 5 mg/kg = 550 mg in 0.9% NaCl 100 mL infusion       fluorouracil (Adrucil) 2,400 mg/m2 = 5,470 mg in 0.9% NaCl 240 mL chemo infusion       Antiemetics       palonosetron (ALOXI) 0.25 mg with Dexamethasone (DECADRON) 12 mg in NS 50 mL IVPB  12/3/2024       Chemotherapy       bevacizumab-awwb (MVASI) 5 mg/kg = 550 mg in 0.9% NaCl 100 mL infusion       fluorouracil (Adrucil) 2,400 mg/m2 = 5,470 mg in 0.9% NaCl 240 mL chemo infusion       Antiemetics       palonosetron (ALOXI) 0.25 mg with Dexamethasone (DECADRON) 12 mg in NS 50 mL IVPB  12/17/2024       Chemotherapy       bevacizumab-awwb (MVASI) 5 mg/kg = 550 mg in 0.9% NaCl 100 mL infusion       fluorouracil (Adrucil) 2,400 mg/m2 = 5,470 mg in 0.9% NaCl 240 mL chemo infusion       Antiemetics       palonosetron (ALOXI) 0.25 mg with Dexamethasone (DECADRON) 12 mg in NS 50 mL IVPB  12/31/2024       Chemotherapy       bevacizumab-awwb (MVASI) 5 mg/kg = 550 mg in 0.9% NaCl 100 mL infusion       fluorouracil (Adrucil) 2,400 mg/m2 = 5,470 mg in 0.9% NaCl 240 mL chemo infusion       Antiemetics       palonosetron (ALOXI) 0.25 mg with Dexamethasone (DECADRON) 12 mg in NS 50 mL IVPB

## 2024-11-19 NOTE — Clinical Note
Prema Lopez, would you mind seeing her in clinic to discuss radiation? She was discussed in tumor board 11/13 with recommendation for short course radiation. Thanks!

## 2024-12-02 ENCOUNTER — LAB VISIT (OUTPATIENT)
Dept: LAB | Facility: HOSPITAL | Age: 49
End: 2024-12-02
Attending: PHYSICIAN ASSISTANT
Payer: COMMERCIAL

## 2024-12-02 DIAGNOSIS — C20 RECTAL ADENOCARCINOMA: ICD-10-CM

## 2024-12-02 LAB
ALBUMIN SERPL BCP-MCNC: 3.9 G/DL (ref 3.5–5.2)
ALP SERPL-CCNC: 125 U/L (ref 40–150)
ALT SERPL W/O P-5'-P-CCNC: 30 U/L (ref 10–44)
ANION GAP SERPL CALC-SCNC: 14 MMOL/L (ref 8–16)
AST SERPL-CCNC: 28 U/L (ref 10–40)
BACTERIA #/AREA URNS HPF: ABNORMAL /HPF
BASOPHILS # BLD AUTO: 0.05 K/UL (ref 0–0.2)
BASOPHILS NFR BLD: 0.6 % (ref 0–1.9)
BILIRUB SERPL-MCNC: 0.3 MG/DL (ref 0.1–1)
BILIRUB UR QL STRIP: NEGATIVE
BUN SERPL-MCNC: 15 MG/DL (ref 6–20)
CALCIUM SERPL-MCNC: 9.5 MG/DL (ref 8.7–10.5)
CEA SERPL-MCNC: 2.5 NG/ML (ref 0–5)
CHLORIDE SERPL-SCNC: 99 MMOL/L (ref 95–110)
CLARITY UR: CLEAR
CO2 SERPL-SCNC: 25 MMOL/L (ref 23–29)
COLOR UR: YELLOW
CREAT SERPL-MCNC: 0.9 MG/DL (ref 0.5–1.4)
DIFFERENTIAL METHOD BLD: ABNORMAL
EOSINOPHIL # BLD AUTO: 0.2 K/UL (ref 0–0.5)
EOSINOPHIL NFR BLD: 2.1 % (ref 0–8)
ERYTHROCYTE [DISTWIDTH] IN BLOOD BY AUTOMATED COUNT: 16.9 % (ref 11.5–14.5)
EST. GFR  (NO RACE VARIABLE): >60 ML/MIN/1.73 M^2
GLUCOSE SERPL-MCNC: 121 MG/DL (ref 70–110)
GLUCOSE UR QL STRIP: NEGATIVE
HCT VFR BLD AUTO: 39.7 % (ref 37–48.5)
HGB BLD-MCNC: 13.2 G/DL (ref 12–16)
HGB UR QL STRIP: NEGATIVE
IMM GRANULOCYTES # BLD AUTO: 0.02 K/UL (ref 0–0.04)
IMM GRANULOCYTES NFR BLD AUTO: 0.2 % (ref 0–0.5)
KETONES UR QL STRIP: NEGATIVE
LEUKOCYTE ESTERASE UR QL STRIP: ABNORMAL
LYMPHOCYTES # BLD AUTO: 2.3 K/UL (ref 1–4.8)
LYMPHOCYTES NFR BLD: 27.5 % (ref 18–48)
MCH RBC QN AUTO: 28.4 PG (ref 27–31)
MCHC RBC AUTO-ENTMCNC: 33.2 G/DL (ref 32–36)
MCV RBC AUTO: 86 FL (ref 82–98)
MICROSCOPIC COMMENT: ABNORMAL
MONOCYTES # BLD AUTO: 0.8 K/UL (ref 0.3–1)
MONOCYTES NFR BLD: 9.4 % (ref 4–15)
NEUTROPHILS # BLD AUTO: 5 K/UL (ref 1.8–7.7)
NEUTROPHILS NFR BLD: 60.2 % (ref 38–73)
NITRITE UR QL STRIP: POSITIVE
NRBC BLD-RTO: 0 /100 WBC
PH UR STRIP: 7 [PH] (ref 5–8)
PLATELET # BLD AUTO: 245 K/UL (ref 150–450)
PMV BLD AUTO: 11.2 FL (ref 9.2–12.9)
POTASSIUM SERPL-SCNC: 4 MMOL/L (ref 3.5–5.1)
PROT SERPL-MCNC: 7.4 G/DL (ref 6–8.4)
PROT UR QL STRIP: NEGATIVE
RBC # BLD AUTO: 4.64 M/UL (ref 4–5.4)
SODIUM SERPL-SCNC: 138 MMOL/L (ref 136–145)
SP GR UR STRIP: 1.02 (ref 1–1.03)
SQUAMOUS #/AREA URNS HPF: 2 /HPF
URN SPEC COLLECT METH UR: ABNORMAL
UROBILINOGEN UR STRIP-ACNC: NEGATIVE EU/DL
WBC # BLD AUTO: 8.39 K/UL (ref 3.9–12.7)
WBC #/AREA URNS HPF: 7 /HPF (ref 0–5)

## 2024-12-02 PROCEDURE — 81000 URINALYSIS NONAUTO W/SCOPE: CPT | Performed by: INTERNAL MEDICINE

## 2024-12-02 PROCEDURE — 36415 COLL VENOUS BLD VENIPUNCTURE: CPT | Performed by: PHYSICIAN ASSISTANT

## 2024-12-02 PROCEDURE — 85025 COMPLETE CBC W/AUTO DIFF WBC: CPT | Performed by: PHYSICIAN ASSISTANT

## 2024-12-02 PROCEDURE — 80053 COMPREHEN METABOLIC PANEL: CPT | Performed by: PHYSICIAN ASSISTANT

## 2024-12-02 PROCEDURE — 82378 CARCINOEMBRYONIC ANTIGEN: CPT | Performed by: PHYSICIAN ASSISTANT

## 2024-12-03 ENCOUNTER — OFFICE VISIT (OUTPATIENT)
Dept: HEMATOLOGY/ONCOLOGY | Facility: CLINIC | Age: 49
End: 2024-12-03
Payer: COMMERCIAL

## 2024-12-03 DIAGNOSIS — N20.0 NEPHROLITHIASIS: ICD-10-CM

## 2024-12-03 DIAGNOSIS — G89.3 NEOPLASM RELATED PAIN: ICD-10-CM

## 2024-12-03 DIAGNOSIS — E78.5 HYPERLIPIDEMIA, UNSPECIFIED HYPERLIPIDEMIA TYPE: ICD-10-CM

## 2024-12-03 DIAGNOSIS — D49.9 IMMUNODEFICIENCY SECONDARY TO NEOPLASM: ICD-10-CM

## 2024-12-03 DIAGNOSIS — T45.1X5A IMMUNODEFICIENCY SECONDARY TO CHEMOTHERAPY: ICD-10-CM

## 2024-12-03 DIAGNOSIS — D84.81 IMMUNODEFICIENCY SECONDARY TO NEOPLASM: ICD-10-CM

## 2024-12-03 DIAGNOSIS — C20 RECTAL CANCER: Primary | ICD-10-CM

## 2024-12-03 DIAGNOSIS — Z79.899 IMMUNODEFICIENCY SECONDARY TO CHEMOTHERAPY: ICD-10-CM

## 2024-12-03 DIAGNOSIS — I10 HYPERTENSION, UNSPECIFIED TYPE: ICD-10-CM

## 2024-12-03 DIAGNOSIS — D84.821 IMMUNODEFICIENCY SECONDARY TO CHEMOTHERAPY: ICD-10-CM

## 2024-12-03 DIAGNOSIS — K58.1 IRRITABLE BOWEL SYNDROME WITH CONSTIPATION: ICD-10-CM

## 2024-12-03 DIAGNOSIS — C78.02 MALIGNANT NEOPLASM METASTATIC TO LEFT LUNG: ICD-10-CM

## 2024-12-03 DIAGNOSIS — D50.9 MICROCYTIC ANEMIA: ICD-10-CM

## 2024-12-03 DIAGNOSIS — F41.1 GENERALIZED ANXIETY DISORDER: ICD-10-CM

## 2024-12-03 DIAGNOSIS — J30.9 ALLERGIC RHINITIS, UNSPECIFIED SEASONALITY, UNSPECIFIED TRIGGER: ICD-10-CM

## 2024-12-03 PROCEDURE — 1159F MED LIST DOCD IN RCRD: CPT | Mod: CPTII,95,, | Performed by: PHYSICIAN ASSISTANT

## 2024-12-03 PROCEDURE — G2211 COMPLEX E/M VISIT ADD ON: HCPCS | Mod: 95,,, | Performed by: PHYSICIAN ASSISTANT

## 2024-12-03 PROCEDURE — 99215 OFFICE O/P EST HI 40 MIN: CPT | Mod: 95,,, | Performed by: PHYSICIAN ASSISTANT

## 2024-12-03 PROCEDURE — 1160F RVW MEDS BY RX/DR IN RCRD: CPT | Mod: CPTII,95,, | Performed by: PHYSICIAN ASSISTANT

## 2024-12-03 RX ORDER — SODIUM CHLORIDE 0.9 % (FLUSH) 0.9 %
10 SYRINGE (ML) INJECTION
Status: CANCELLED | OUTPATIENT
Start: 2024-12-03

## 2024-12-03 RX ORDER — HEPARIN 100 UNIT/ML
500 SYRINGE INTRAVENOUS
Status: CANCELLED | OUTPATIENT
Start: 2024-12-03

## 2024-12-03 RX ORDER — OXYCODONE HYDROCHLORIDE 10 MG/1
10 TABLET ORAL EVERY 4 HOURS PRN
Qty: 180 TABLET | Refills: 0 | Status: SHIPPED | OUTPATIENT
Start: 2024-12-03

## 2024-12-03 RX ORDER — PROCHLORPERAZINE EDISYLATE 5 MG/ML
10 INJECTION INTRAMUSCULAR; INTRAVENOUS ONCE AS NEEDED
Status: CANCELLED | OUTPATIENT
Start: 2024-12-03

## 2024-12-03 RX ORDER — DIPHENHYDRAMINE HYDROCHLORIDE 50 MG/ML
50 INJECTION INTRAMUSCULAR; INTRAVENOUS ONCE AS NEEDED
Status: CANCELLED | OUTPATIENT
Start: 2024-12-03

## 2024-12-03 RX ORDER — SODIUM CHLORIDE 0.9 % (FLUSH) 0.9 %
10 SYRINGE (ML) INJECTION
Status: CANCELLED | OUTPATIENT
Start: 2024-12-05

## 2024-12-03 RX ORDER — HEPARIN 100 UNIT/ML
500 SYRINGE INTRAVENOUS
Status: CANCELLED | OUTPATIENT
Start: 2024-12-05

## 2024-12-03 RX ORDER — EPINEPHRINE 0.3 MG/.3ML
0.3 INJECTION SUBCUTANEOUS ONCE AS NEEDED
Status: CANCELLED | OUTPATIENT
Start: 2024-12-03

## 2024-12-03 RX ORDER — PROCHLORPERAZINE EDISYLATE 5 MG/ML
10 INJECTION INTRAMUSCULAR; INTRAVENOUS ONCE AS NEEDED
Status: CANCELLED | OUTPATIENT
Start: 2024-12-05

## 2024-12-03 NOTE — PROGRESS NOTES
The patient location is: [place], [place]  The chief complaint leading to consultation is: treatment follow up, lab review, chemo clearance     Visit type: audiovisual    Each patient to whom he or she provides medical services by telemedicine is:  (1) informed of the relationship between the physician and patient and the respective role of any other health care provider with respect to management of the patient; and (2) notified that he or she may decline to receive medical services by telemedicine and may withdraw from such care at any time.    Notes:     MEDICAL ONCOLOGY - ESTABLISHED PATIENT VISIT    Reason for visit: rectal cancer     Best Contact Phone Number(s): [phone] (home)      Cancer/Stage/TNM:    Cancer Staging   Rectal adenocarcinoma  Staging form: Colon and Rectum, AJCC 8th Edition  - Clinical stage from 6/21/2024: Stage NAVID (cT2, cN2b, cM1a) - Signed by Dion Rudolph MD on 7/24/2024  - Pathologic: No stage assigned - Unsigned       Oncology History   Rectal adenocarcinoma   6/21/2024 Procedure    Colonoscopy - mild diverticular disease, mass in distal rectum (Complete)     6/21/2024 Cancer Staged    Staging form: Colon and Rectum, AJCC 8th Edition  - Clinical stage from 6/21/2024: Stage NAVID (cT2, cN2b, cM1a)     6/24/2024 Initial Diagnosis    Rectal adenocarcinoma  ERROL  - Colonoscopy 6/21/24: infiltrative, ulcerated, sub-mucosal, fungating and villous bleeding mass found in distal rectum, 6-7cm from the anus proximally.   - Distal rectal mass biopsy 6/21/24: well differentiated adenocarcinoma, negative for LVI and perineural invasion. IHC MSI-H low probability with intact nuclear expressions of MLH1, MSH2, MSH6, and PMS2.        7/15/2024 Imaging Significant Findings    MRI Rectal cancer  Low rectal tumor invading the internal sphincter in close approximation to the mesorectal fascia without definite involvement.  Multiple suspicious mesorectal and superior rectal nodes.      *MR STAGE     T: T1/T2 (tumor confined to rectal wall)     N: N+ (short axis ? 9 mm)     Blayne statement: Multiple suspicious mesorectal and superior rectal nodes.     *MRF: threatened (tumor margin within 1-2 mm of MRF) noting this is a low rectal tumor.  No definite evidence of invasion.     Sphincter Involvement: Yes involvement the internal sphincter     Suspicious Extramesorectal Lymph Nodes: No     EMVI: No     7/16/2024 Tumor Markers    Patient's tumor was tested for the following markers: CEA                                              Results of the tumor marker test revealed 2.9     7/17/2024 Imaging Significant Findings    CT CAP  In this patient with a known history of rectal malignancy, there are few borderline prominent lymph nodes adjacent to the rectum measuring up to 8 mm.  There are solid nodules in both lungs, the largest in the lingula measuring up to 1 cm.  Given the patient's primary malignancy, pulmonary metastatic disease cannot be excluded.  Follow-up based upon oncologic guidance.     Prominent soft tissue density in the anterior superior mediastinum which could possibly represent residual thymic tissue with a focal solid structure measuring up to 1.0 x 1.7 cm, possibly an enlarged lymph node versus other soft tissue mass.     Indeterminate focal regions of sclerosis within the T10 vertebra and right sacrum.  Osseous metastatic disease cannot be excluded.     Cholelithiasis without CT evidence for cholecystitis.     Large hiatal hernia.  Diverticulosis coli without diverticulitis.     Large left UPJ stone measuring up to 1.2 cm without hydronephrosis or hydroureter.     7/19/2024 Imaging Significant Findings    PET CT  Hypermetabolic soft tissue thickening along the rectal wall compatible with known malignancy.  There are a few prominent perirectal mesorectal lymph nodes without significant hypermetabolic activity, please note some may be too small for characterization by PET.     0.9 cm  lingular pulmonary nodule with mild hypermetabolic activity and prominent hypermetabolic mediastinal lymph node, concerning for metastatic foci.     8/12/2024 -  Chemotherapy    Treatment Summary   Plan Name: OP GI mFOLFOX6 (oxaliplatin leucovorin fluorouracil) with bevacizumab Q2W  Treatment Goal: Palliative  Status: Active  Start Date: 8/12/2024  End Date: 7/17/2025 (Planned)  Provider: Dion Rudolph MD  Chemotherapy: oxaliplatin (ELOXATIN) 85 mg/m2 = 194 mg in D5W 603.8 mL chemo infusion, 85 mg/m2 = 194 mg, Intravenous, Clinic/HOD 1 time, 4 of 4 cycles  Administration: 194 mg (8/12/2024), 194 mg (8/28/2024), 194 mg (9/12/2024), 194 mg (9/25/2024)  fluorouracil (Adrucil) 2,400 mg/m2 = 5,470 mg in 0.9% NaCl 240 mL chemo infusion, 2,400 mg/m2 = 5,470 mg, Intravenous, Clinic/HOD 1 time, 7 of 24 cycles  Administration: 5,470 mg (8/12/2024), 5,470 mg (8/28/2024), 5,470 mg (9/12/2024), 5,470 mg (9/25/2024), 5,470 mg (10/23/2024), 5,470 mg (11/6/2024), 5,470 mg (11/20/2024)  bevacizumab-awwb (MVASI) 500 mg in 0.9% NaCl 100 mL infusion, 550 mg, Intravenous, Clinic/HOD 1 time, 7 of 23 cycles  Administration: 500 mg (8/12/2024), 500 mg (8/28/2024), 500 mg (9/12/2024), 500 mg (9/25/2024), 500 mg (10/23/2024), 500 mg (11/6/2024), 500 mg (11/20/2024)        Interval History:   Ms. Segura returns prior to cycle 8 with maintenance 5-FU plus avastin. She is doing okay overall. She had a good holiday with her family as well. She is eating and appetite is stable. She is anxious about starting RT but ready. The last cycle was a little harder on her in terms of fatigue and nausea. Took her much longer to bounce back but she is doing well today. She does report constipation which has been controlled. No other concerns or complaints.     Patient presents to virtual visit alone.  ECOG PS is 0. Scheduled for simulation with Dr Lopez on Friday, 12/6/2024.    ROS:   Review of Systems   Constitutional:  Positive for malaise/fatigue.  Negative for fever and weight loss.   HENT:  Negative for hearing loss.    Eyes:  Negative for blurred vision.   Respiratory:  Negative for cough, hemoptysis and shortness of breath.    Cardiovascular:  Negative for chest pain.   Gastrointestinal:  Positive for abdominal pain and nausea. Negative for blood in stool, constipation, diarrhea and vomiting.   Genitourinary:  Negative for dysuria, frequency and urgency.   Musculoskeletal:  Negative for back pain and myalgias.   Skin:  Negative for rash.   Neurological:  Negative for dizziness, tingling, weakness and headaches.   Endo/Heme/Allergies:  Does not bruise/bleed easily.   Psychiatric/Behavioral:  Negative for depression. The patient is not nervous/anxious.        Past Medical History:   Past Medical History:   Diagnosis Date    Anxiety disorder, unspecified     Fibroids     HTN (hypertension)     Sinus problem         Past Surgical History:   Past Surgical History:   Procedure Laterality Date    BILATERAL SALPINGO-OOPHORECTOMY (BSO) Bilateral 12/9/2020    Procedure: SALPINGO-OOPHORECTOMY, BILATERAL;  Surgeon: Yun Recio MD;  Location: [facility] OR;  Service: OB/GYN;  Laterality: Bilateral;  COVID NEGATIVE 12/3    INCISION OF UVULA      INSERTION OF TUNNELED CENTRAL VENOUS CATHETER (CVC) WITH SUBCUTANEOUS PORT Right 7/29/2024    Procedure: INSERTION, SINGLE LUMEN CATHETER WITH PORT, WITH FLUOROSCOPIC GUIDANCE;  Surgeon: Russel Layton MD;  Location: [address];  Service: General;  Laterality: Right;    knee arhroscopy      NASAL SEPTUM SURGERY      SINUS SURGERY      TONSILLECTOMY      TOTAL ABDOMINAL HYSTERECTOMY N/A 12/9/2020    Procedure: HYSTERECTOMY, TOTAL, ABDOMINAL;  Surgeon: Yun Recio MD;  Location: [facility] OR;  Service: OB/GYN;  Laterality: N/A;    UTERINE FIBROID SURGERY          Family History:   Family History   Problem Relation Name Age of Onset    Asthma Mother      Hypertension Mother      COPD Father      Diabetes Father    "   Hypertension Father      Esophageal cancer Maternal Uncle Jossue     Colon polyps Maternal Grandfather          "every colonoscopy"    Bone cancer Paternal Grandmother      Kidney cancer Maternal Aunt Sherlyn 59        kidney removal    Skin cancer Maternal Uncle Tim         possibly melanoma  recurrent    Throat cancer Maternal Uncle Medardo         late 20s - early 30s    Lung cancer Maternal Uncle Maksim         squamous cell  smoke exposure    Leukemia Maternal Uncle Kendall     Colon cancer Neg Hx          Social History:   Social History     Tobacco Use    Smoking status: Never    Smokeless tobacco: Never   Substance Use Topics    Alcohol use: Yes     Comment: Socially        I have reviewed and updated the patient's past medical, surgical, family and social histories.    Allergies:   Review of patient's allergies indicates:  No Known Allergies     Medications:   Current Outpatient Medications   Medication Sig Dispense Refill    cetirizine (ZYRTEC) 10 MG tablet Take 10 mg by mouth daily as needed.       cholecalciferol, vitamin D3, 5,000 unit Tab Take 5,000 Units by mouth once daily.      citalopram (CELEXA) 20 MG tablet Take 20 mg by mouth every morning.      EVAMIST 1.53 mg/spray (1.7%) transdermal spray SPRAY 2 SPRAYS TO FOREARM DAILY      fluticasone (FLONASE) 50 mcg/actuation nasal spray 1 spray (50 mcg total) by Each Nare route once daily. (Patient not taking: Reported on 9/10/2024) 16 g 0    hydroCHLOROthiazide (HYDRODIURIL) 25 MG tablet Take 25 mg by mouth every morning.      LIDOcaine-prilocaine (EMLA) cream Apply topically as needed. 30 g 3    LORazepam (ATIVAN) 0.5 MG tablet Take 1 tablet (0.5 mg total) by mouth every 12 (twelve) hours as needed for Anxiety. 5 tablet 0    niacin 50 MG Tab niacin      niacin 50 MG Tab       omega 3-dha-epa-fish oil (FISH OIL) 1,000 mg (120 mg-180 mg) Cap Take by mouth once daily.      ondansetron (ZOFRAN-ODT) 8 MG TbDL Take 1 tablet (8 mg total) by mouth every 6 (six) " hours as needed (chemotherapy induced nausea/vomiting). 60 tablet 4    rosuvastatin (CRESTOR) 10 MG tablet Take by mouth every evening.       No current facility-administered medications for this visit.      Physical Exam:   [facility] 11/18/2020          Physical Exam  Constitutional:       General: She is not in acute distress.     Appearance: Normal appearance. She is well-developed and normal weight. She is not ill-appearing, toxic-appearing or diaphoretic.      Comments: PE limited d/t nature of virtual visit   HENT:      Head: Normocephalic and atraumatic.      Right Ear: External ear normal.      Left Ear: External ear normal.   Eyes:      General: No scleral icterus.     Conjunctiva/sclera: Conjunctivae normal.   Pulmonary:      Effort: Pulmonary effort is normal. No respiratory distress.   Skin:     Capillary Refill: Capillary refill takes less than 2 seconds.   Neurological:      Mental Status: She is alert and oriented to person, place, and time.      Gait: Gait is intact.   Psychiatric:         Mood and Affect: Mood normal.         Behavior: Behavior normal.         Labs:   Recent Results (from the past 48 hours)   Urinalysis    Collection Time: 12/02/24  1:21 PM   Result Value Ref Range    Specimen UA Urine, Clean Catch     Color, UA Yellow Yellow, Straw, Norma    Appearance, UA Clear Clear    pH, UA 7.0 5.0 - 8.0    Specific Gravity, UA 1.020 1.005 - 1.030    Protein, UA Negative Negative    Glucose, UA Negative Negative    Ketones, UA Negative Negative    Bilirubin (UA) Negative Negative    Occult Blood UA Negative Negative    Nitrite, UA Positive (A) Negative    Urobilinogen, UA Negative <2.0 EU/dL    Leukocytes, UA Trace (A) Negative   Urinalysis Microscopic    Collection Time: 12/02/24  1:21 PM   Result Value Ref Range    WBC, UA 7 (H) 0 - 5 /hpf    Bacteria Moderate (A) None-Occ /hpf    Squam Epithel, UA 2 /hpf    Microscopic Comment SEE COMMENT    CBC W/ AUTO DIFFERENTIAL    Collection Time: 12/02/24   1:23 PM   Result Value Ref Range    WBC 8.39 3.90 - 12.70 K/uL    RBC 4.64 4.00 - 5.40 M/uL    Hemoglobin 13.2 12.0 - 16.0 g/dL    Hematocrit 39.7 37.0 - 48.5 %    MCV 86 82 - 98 fL    MCH 28.4 27.0 - 31.0 pg    MCHC 33.2 32.0 - 36.0 g/dL    RDW 16.9 (H) 11.5 - 14.5 %    Platelets 245 150 - 450 K/uL    MPV 11.2 9.2 - 12.9 fL    Immature Granulocytes 0.2 0.0 - 0.5 %    Gran # (ANC) 5.0 1.8 - 7.7 K/uL    Immature Grans (Abs) 0.02 0.00 - 0.04 K/uL    Lymph # 2.3 1.0 - 4.8 K/uL    Mono # 0.8 0.3 - 1.0 K/uL    Eos # 0.2 0.0 - 0.5 K/uL    Baso # 0.05 0.00 - 0.20 K/uL    nRBC 0 0 /100 WBC    Gran % 60.2 38.0 - 73.0 %    Lymph % 27.5 18.0 - 48.0 %    Mono % 9.4 4.0 - 15.0 %    Eosinophil % 2.1 0.0 - 8.0 %    Basophil % 0.6 0.0 - 1.9 %    Differential Method Automated    Comprehensive Metabolic Panel    Collection Time: 12/02/24  1:23 PM   Result Value Ref Range    Sodium 138 136 - 145 mmol/L    Potassium 4.0 3.5 - 5.1 mmol/L    Chloride 99 95 - 110 mmol/L    CO2 25 23 - 29 mmol/L    Glucose 121 (H) 70 - 110 mg/dL    BUN 15 6 - 20 mg/dL    Creatinine 0.9 0.5 - 1.4 mg/dL    Calcium 9.5 8.7 - 10.5 mg/dL    Total Protein 7.4 6.0 - 8.4 g/dL    Albumin 3.9 3.5 - 5.2 g/dL    Total Bilirubin 0.3 0.1 - 1.0 mg/dL    Alkaline Phosphatase 125 40 - 150 U/L    AST 28 10 - 40 U/L    ALT 30 10 - 44 U/L    eGFR >60 >60 mL/min/1.73 m^2    Anion Gap 14 8 - 16 mmol/L   CEA    Collection Time: 12/02/24  1:23 PM   Result Value Ref Range    CEA 2.5 0.0 - 5.0 ng/mL     Labs reviewed.     Imaging:      MRI 10/31/2024:     Impression:     Motion degraded sequences.     *TUMOR     Compared to MRI 07/15/2024, post treatment primary tumor assessment: Probable Incomplete response (likely residual tumor)     Improving heterogeneous signal and wall thickening of the rectum with some residual restricted diffusion which suggests residual tumor.  Improvement of the size of multiple mesorectal lymph nodes.     *NODES     Suspicious Mesorectal Lymph nodes:  No     Suspicious Extramesorectal Lymph nodes: No    CT CAP - 10/21/24:    Impression:     In this patient with a known history of rectal malignancy, at the perirectal lymph nodes have significantly decreased in size, for example the 8 mm lymph node now measures 4 mm.  Additionally, the pulmonary nodules are also either resolved or significantly decreased in size.  For example the spiculated nodule in the lingula previously measured 1 cm and currently measures 0.5 cm.     Soft tissue density nodule in the anterior mediastinum measures 1.7 cm, stable.     Focal regions of sclerosis in the right roxann sacrum and T10 vertebra, stable and remaining indeterminate.     Cholelithiasis.     Large hiatal hernia.     Interval placement of a left ureteral stent      Path:   Distal rectal mass biopsy 6/21/24: well differentiated adenocarcinoma, negative for LVI and perineural invasion. IHC MSI-H low probability with intact nuclear expressions of MLH1, MSH2, MSH6, and PMS2.         Assessment:       1. Rectal cancer    2. Malignant neoplasm metastatic to left lung    3. 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 Microcytic anemia    6. Nephrolithiasis    7. Generalized anxiety disorder    8. Hypertension, unspecified type    9. Hyperlipidemia, unspecified hyperlipidemia type    10. Allergic rhinitis, unspecified seasonality, unspecified trigger    11. Irritable bowel syndrome with constipation         Plan:         # Rectal adenocarcinoma  Imelda Kimble is a 49 y.o. woman with distal rectal G1 adenocarcinoma pMMR, 5.6 cm long, below the anterior peritoneal reflection, MRF threatened, with involvement the internal sphincter, EMVI-, with multiple (8+) small suspicious mesorectal and superior rectal nodes.     She was in her usual health and went to PCP for annual screening in 2024. PCP recommended screening colonoscopy as she is >45. 2 weeks prior to her screening colonoscopy, she  noticed hematochezia and rectal pain. Her only symptom prior to this was constipation, which is lifelong. Found to have distal rectal tumor as above.    PET/CT 7/19/24 showed hypermetabolism of the primary rectal tumor with small mesorectal lymph nodes.  Additional 9mm L lung nodule and mediastinal lymph node were hypermetabolic.    We discussed her at tumor board with recommendations for induction chemotherapy and then potential local therapy (chemoradiation) to rectum and SBRT/surgery to lung.    Had port placed on 7/29/24    Received cycle 1 FOLFOX + Avastin on 8/12/24. Ms. Segura returns prior to cycle 6 of FOLFOX + bevacizumab, now on maintenance 5-FU plus avastin. Last dose of Oxaliplatin was given on 9/25/2024  Repeat imaging after cycle 4 shows good response in mesorectal lymph nodes and L lingular lung metastasis.  Had restaging MRI of pelvis rectum that displays very good response to treatment. Reviewed by Dr Rudolph and Dr. Menendez and will discuss moving towards consolidative chemoradiation to her rectum. Will present at colorectal tumor board after MRI.    Doing well. Had a good holiday with her family. Eating and appetite is stable.   I have reviewed the CBC and CMP, adequate for treatment   UA stable. No urinary symptoms.   Proceed with cycle 8 of 5-FU. Holding Avastin in preparation for RT. Will have simulation with Dr Lopez on 12/6/2024.  Will send pain medication to the pharmacy for her as well in preparation for RT    RTC in 2 weeks. Pt would like to continue treatment in [place]. After she completes RT, will repeat imaging approx. 4-6 weeks after RT.  Will resume maintenance chemotherapy as well with 5-FU/avastin approx 2-4 weeks after completing RT    # Microcytic anemia, nephrolithiasis  Suspect MICHELLE.  Mild.  Monitor.    Doing better. Ureteral stent was removed on 10/28/2024. No urinary symptoms at this time.     Other chronic disease managed by PCP   # Generalized anxiety disorder -  continue citalopram   # HTN - HCTZ   # HLD - statin  # allergic rhinitis - zyrtec and flonase, stable   # IBS-Constipation - Stable    Follow up: 2 weeks for clinic visit and lab work    Pte and family members displayed understanding of the above encounter and treatment plan. All thoughtful questions were answered to their satisfaction. Pte was advised to notify the care team or proceed to the ER if signs and symptoms worsen.     Visit today included increased complexity associated with the care of the episodic problem chemotherapy  addressed and managing the longitudinal care of the patient due to the serious and/or complex managed problem(s) GI malignancies/cancer      VIKASH Guzman, PAAzamC  [facility] MD [place]  Dept of Hematology/Oncology  PAARELY to GI Oncology team         Route Chart for Scheduling    Med Onc Chart Routing      Follow up with physician 6 weeks. lab work and clinic visit   Follow up with LEIDA 2 weeks and 4 weeks. lab work and clinic visit   Infusion scheduling note    Injection scheduling note    Labs CBC, CMP, CEA and urinalysis   Scheduling:  Preferred lab:  Lab interval: every 2 weeks     Imaging    Pharmacy appointment    Other referrals              Treatment Plan Information   OP GI mFOLFOX6 (oxaliplatin leucovorin fluorouracil) with bevacizumab Q2W Dion Rudolph MD   Associated diagnosis: Rectal adenocarcinoma Stage NAVID cT2, cN2b, cM1a noted on 7/15/2024   Line of treatment: First Line  Treatment Goal: Palliative     Upcoming Treatment Dates - OP GI mFOLFOX6 (oxaliplatin leucovorin fluorouracil) with bevacizumab Q2W    12/3/2024       Chemotherapy       fluorouracil (Adrucil) 2,400 mg/m2 = 5,470 mg in 0.9% NaCl 240 mL chemo infusion       Antiemetics       palonosetron (ALOXI) 0.25 mg with Dexamethasone (DECADRON) 12 mg in NS 50 mL IVPB  12/17/2024       Chemotherapy       bevacizumab-awwb (MVASI) 5 mg/kg = 550 mg in 0.9% NaCl 100 mL infusion       fluorouracil (Adrucil) 2,400  mg/m2 = 5,470 mg in 0.9% NaCl 240 mL chemo infusion       Antiemetics       palonosetron (ALOXI) 0.25 mg with Dexamethasone (DECADRON) 12 mg in NS 50 mL IVPB  12/31/2024       Chemotherapy       bevacizumab-awwb (MVASI) 5 mg/kg = 550 mg in 0.9% NaCl 100 mL infusion       fluorouracil (Adrucil) 2,400 mg/m2 = 5,470 mg in 0.9% NaCl 240 mL chemo infusion       Antiemetics       palonosetron (ALOXI) 0.25 mg with Dexamethasone (DECADRON) 12 mg in NS 50 mL IVPB  1/14/2025       Chemotherapy       bevacizumab-awwb (MVASI) 5 mg/kg = 550 mg in 0.9% NaCl 100 mL infusion       fluorouracil (Adrucil) 2,400 mg/m2 = 5,470 mg in 0.9% NaCl 240 mL chemo infusion       Antiemetics       palonosetron (ALOXI) 0.25 mg with Dexamethasone (DECADRON) 12 mg in NS 50 mL IVPB

## 2024-12-06 ENCOUNTER — OFFICE VISIT (OUTPATIENT)
Dept: RADIATION ONCOLOGY | Facility: CLINIC | Age: 49
End: 2024-12-06
Payer: COMMERCIAL

## 2024-12-06 ENCOUNTER — HOSPITAL ENCOUNTER (OUTPATIENT)
Dept: RADIATION THERAPY | Facility: HOSPITAL | Age: 49
Discharge: HOME | End: 2024-12-06
Payer: COMMERCIAL

## 2024-12-06 ENCOUNTER — PATIENT MESSAGE (OUTPATIENT)
Dept: RADIATION ONCOLOGY | Facility: CLINIC | Age: 49
End: 2024-12-06

## 2024-12-06 VITALS
WEIGHT: 228.63 LBS | HEART RATE: 84 BPM | HEIGHT: 67 IN | BODY MASS INDEX: 35.88 KG/M2 | SYSTOLIC BLOOD PRESSURE: 140 MMHG | DIASTOLIC BLOOD PRESSURE: 92 MMHG | OXYGEN SATURATION: 97 %

## 2024-12-06 DIAGNOSIS — C20 RECTAL CANCER: Primary | ICD-10-CM

## 2024-12-06 PROCEDURE — 77014 PR  CT GUIDANCE PLACEMENT RAD THERAPY FIELDS: CPT | Mod: 26,,, | Performed by: STUDENT IN AN ORGANIZED HEALTH CARE EDUCATION/TRAINING PROGRAM

## 2024-12-06 PROCEDURE — 77014 HC CT GUIDANCE RADIATION THERAPY FLDS PLACEMENT: CPT | Mod: TC | Performed by: STUDENT IN AN ORGANIZED HEALTH CARE EDUCATION/TRAINING PROGRAM

## 2024-12-06 PROCEDURE — 77263 THER RADIOLOGY TX PLNG CPLX: CPT | Mod: ,,, | Performed by: STUDENT IN AN ORGANIZED HEALTH CARE EDUCATION/TRAINING PROGRAM

## 2024-12-06 PROCEDURE — 99999 PR PBB SHADOW E&M-EST. PATIENT-LVL IV: CPT | Mod: PBBFAC,,, | Performed by: STUDENT IN AN ORGANIZED HEALTH CARE EDUCATION/TRAINING PROGRAM

## 2024-12-06 PROCEDURE — 77334 RADIATION TREATMENT AID(S): CPT | Mod: TC | Performed by: STUDENT IN AN ORGANIZED HEALTH CARE EDUCATION/TRAINING PROGRAM

## 2024-12-06 PROCEDURE — 77334 RADIATION TREATMENT AID(S): CPT | Mod: 26,,, | Performed by: STUDENT IN AN ORGANIZED HEALTH CARE EDUCATION/TRAINING PROGRAM

## 2024-12-06 NOTE — PROGRESS NOTES
[facility] - Radiation Oncology follow-up Note           Date of Service: 12/06/2024     Chief Complaint: rectal cancer     Reason for visit: consideration for radiation to the pelvis, follow-up x1     Referring Physician: Dr Menendez ([facility])     Implantable devices: denies     Therapy to Date:  No radiation     Diagnosis/Assessment:   Imelda Kimble is a 49 y.o. woman with Stage NAVID (cT2, cN2b, cM1a) distal G1 adenocarcinoma pMMR, 5.6 cm long, below the anterior peritoneal reflection, MRF threatened, with involvement the internal sphincter, EMVI-, with multiple (8+) small suspicious mesorectal and superior rectal nodes.  PET scan showed evidence of metastatic pulmonary disease     PMH hysterectomy/BSO, anxiety, HTN, Left knee replacement        She received mFOLFOX with bevacizumab Q2W (last dose of bevacizumab 11/20/2024) from  08/12/2024 - 12/04/2024 with Dr Rudolph       ECOG 0  Dealing from chemo related fatigue, GERD and nausea.    Discussed at Colorectal surgery tumor board plan for short course radiation       Plan   As per colorectal surgery tumor board, we recommend short course radiation therapy 25 Gy in 5 daily fractions given Monday through Friday using IMRT to decrease small bowel and bladder dose.    I reviewed the rationale and goal of the proposed treatment course. The radiotherapeutic procedure with associated side effects and potential complications were explained. They had the opportunity to ask questions which were answered to the best of my knowledge.   The patient voiced understanding of the above and has elected to proceed with treatment.   RT consent form was signed.   They were also given our contact information should further questions or concerns arise.     - CT simulation scan today full+empty bladder, barium paste over anus, vaginal dilator, start date Wednesday 12/18/2024  - RT consent signed  - systemic therapy with         Interval history:      07/16/2024 initial radiation oncology evaluation - after finding out patient had stage IV disease to lungs  plan was changed to induction chemotherapy      07/19/2024 PET CT scan  0.9 pulmonary nodule with mild hypermetabolic activity in prominent hypermetabolic mediastinal lymph node concerning for metastasis        08/12/2024 - 12/04/2024 Dr Rudolph gave  mFOLFOX with bevacizumab Q2W (last dose of bevacizumab 11/20/2024).    10/21/2024 CT CAP   Perirectal lymph nodes significantly decreased in size    10/31/2024 MRI rectum - motion degraded   Treatment response from 5.6 cm long down to 2.5 cm long primary tumor, no visible regional lymph nodes      11/13/2024 Colorectal surgery tumor board   Plan for a short course radiation therapy  12/02/2024 CEA 2.5      Subjective:   In clinic the patient is accompanied by her boyfriend Rogerio     She reports okay overall but still reports fatigue GERD and nausea    Otherwise she denies any other GI issues such as abdominal pain, diarrhea, constipation, or rectal bleeding.    She denies any history of radiation therapy, implantable cardiac devices, or connective tissue disease.     Social history  From [facility] [employer] [place] at  [facility] in [place], [place].  (went to school at [employer] in )       Current Outpatient Medications on File Prior to Visit   Medication Sig Dispense Refill    cetirizine (ZYRTEC) 10 MG tablet Take 10 mg by mouth daily as needed.       cholecalciferol, vitamin D3, 5,000 unit Tab Take 5,000 Units by mouth once daily.      citalopram (CELEXA) 20 MG tablet Take 20 mg by mouth every morning.      EVAMIST 1.53 mg/spray (1.7%) transdermal spray SPRAY 2 SPRAYS TO FOREARM DAILY      fluticasone (FLONASE) 50 mcg/actuation nasal spray 1 spray (50 mcg total) by Each Nare route once daily. (Patient not taking: Reported on 9/10/2024) 16 g 0     hydroCHLOROthiazide (HYDRODIURIL) 25 MG tablet Take 25 mg by mouth every morning.      LIDOcaine-prilocaine (EMLA) cream Apply topically as needed. 30 g 3    LORazepam (ATIVAN) 0.5 MG tablet Take 1 tablet (0.5 mg total) by mouth every 12 (twelve) hours as needed for Anxiety. 5 tablet 0    niacin 50 MG Tab niacin      niacin 50 MG Tab       omega 3-dha-epa-fish oil (FISH OIL) 1,000 mg (120 mg-180 mg) Cap Take by mouth once daily.      ondansetron (ZOFRAN-ODT) 8 MG TbDL Take 1 tablet (8 mg total) by mouth every 6 (six) hours as needed (chemotherapy induced nausea/vomiting). 60 tablet 4    oxyCODONE (ROXICODONE) 10 mg Tab immediate release tablet Take 1 tablet (10 mg total) by mouth every 4 (four) hours as needed. 180 tablet 0    rosuvastatin (CRESTOR) 10 MG tablet Take by mouth every evening.       No current facility-administered medications on file prior to visit.       Review of patient's allergies indicates:  No Known Allergies       Review of Systems   Negative unless as above       HPI:   Imelda Kimble is a 48 y.o. woman with recent diagnosis of rectal cancer after presenting with screening colonoscopy     Oncologic history:  06/21/24 colonoscopy (Dr Rasheed)              Mild diverticulosis              Infiltrative ulcerated submucosal fungating and villous bleeding mass in distal rectum, spanning from anal verge to 6-7cm proximally              Pathology: G1 adenocarcinoma pMMR, without PNI or LVI     7/15/2024 MRI rectum  5.6 cm long low rectal lesion, below the anterior peritoneal reflection, MRF threatened, with involvement the internal sphincter, EMVI-, T1/T2, with multiple (8+) small suspicious mesorectal and superior rectal nodes.                    Objective:   Physical Exam  Vitals reviewed.     Constitutional:       Appearance: Normal appearance.   HENT:      Head: Normocephalic and atraumatic.   Eyes:      Conjunctiva/sclera: Conjunctivae normal.   Cardiovascular:      Comments: extremities well  perfused  Pulmonary:      Effort: Pulmonary effort is normal.   Abdominal:      General: There is no distension.   Genitourinary:     Comments: KODY deferred  Musculoskeletal:         General: Normal range of motion.   Neurological:      General: No focal deficit present.      Mental Status: alert and oriented  Psychiatric:         Mood and Affect: Mood normal.         Behavior: Behavior normal.      Imaging: I have personally reviewed the patient's available images and reports and summarized pertinent findings above in HPI.      Laboratory: I have personally reviewed the patient's available laboratory values and summarized pertinent findings above in HPI.      I spent approximately 30 minutes reviewing the available records and evaluating the patient, out of which over 50% of the time was spent face to face with the patient in counseling and coordinating this patient's care.     Thank you for the opportunity to care for this patient. Please do not hesitate to contact me with any questions.     Kevin Lopez MD/PhD

## 2024-12-09 ENCOUNTER — TELEPHONE (OUTPATIENT)
Dept: HEMATOLOGY/ONCOLOGY | Facility: CLINIC | Age: 49
End: 2024-12-09
Payer: COMMERCIAL

## 2024-12-09 NOTE — TELEPHONE ENCOUNTER
Received secure chat from Kiera Alexander to ask patient if she would be ok with doing labs only on 12/27.  Patient on oral chemo.  Patient is ok to do labs only and will reach out if she has any concerns at that time.  Verbalized understanding of cancellation of appointment.

## 2024-12-11 ENCOUNTER — TELEPHONE (OUTPATIENT)
Dept: HEMATOLOGY/ONCOLOGY | Facility: CLINIC | Age: 49
End: 2024-12-11
Payer: COMMERCIAL

## 2024-12-11 NOTE — TELEPHONE ENCOUNTER
Called to discuss negative results of genetic testing. Patient was unavailable. Left a voicemail with my phone number. Results have been scanned to the media tab.

## 2024-12-16 ENCOUNTER — PATIENT MESSAGE (OUTPATIENT)
Dept: HEMATOLOGY/ONCOLOGY | Facility: CLINIC | Age: 49
End: 2024-12-16
Payer: COMMERCIAL

## 2024-12-16 ENCOUNTER — LAB VISIT (OUTPATIENT)
Dept: LAB | Facility: HOSPITAL | Age: 49
End: 2024-12-16
Attending: INTERNAL MEDICINE
Payer: COMMERCIAL

## 2024-12-16 DIAGNOSIS — C20 RECTAL CANCER: ICD-10-CM

## 2024-12-16 DIAGNOSIS — C20 RECTAL ADENOCARCINOMA: ICD-10-CM

## 2024-12-16 LAB
ALBUMIN SERPL BCP-MCNC: 3.7 G/DL (ref 3.5–5.2)
ALP SERPL-CCNC: 136 U/L (ref 40–150)
ALT SERPL W/O P-5'-P-CCNC: 40 U/L (ref 10–44)
ANION GAP SERPL CALC-SCNC: 11 MMOL/L (ref 8–16)
AST SERPL-CCNC: 27 U/L (ref 10–40)
BACTERIA #/AREA URNS HPF: ABNORMAL /HPF
BILIRUB SERPL-MCNC: 0.4 MG/DL (ref 0.1–1)
BILIRUB UR QL STRIP: NEGATIVE
BUN SERPL-MCNC: 14 MG/DL (ref 6–20)
CALCIUM SERPL-MCNC: 9.9 MG/DL (ref 8.7–10.5)
CEA SERPL-MCNC: 2 NG/ML (ref 0–5)
CHLORIDE SERPL-SCNC: 102 MMOL/L (ref 95–110)
CLARITY UR: ABNORMAL
CO2 SERPL-SCNC: 28 MMOL/L (ref 23–29)
COLOR UR: YELLOW
CREAT SERPL-MCNC: 0.9 MG/DL (ref 0.5–1.4)
ERYTHROCYTE [DISTWIDTH] IN BLOOD BY AUTOMATED COUNT: 16.2 % (ref 11.5–14.5)
EST. GFR  (NO RACE VARIABLE): >60 ML/MIN/1.73 M^2
GLUCOSE SERPL-MCNC: 111 MG/DL (ref 70–110)
GLUCOSE UR QL STRIP: NEGATIVE
HCT VFR BLD AUTO: 40.7 % (ref 37–48.5)
HGB BLD-MCNC: 13.4 G/DL (ref 12–16)
HGB UR QL STRIP: NEGATIVE
IMM GRANULOCYTES # BLD AUTO: 0.03 K/UL (ref 0–0.04)
KETONES UR QL STRIP: NEGATIVE
LEUKOCYTE ESTERASE UR QL STRIP: ABNORMAL
MCH RBC QN AUTO: 28.3 PG (ref 27–31)
MCHC RBC AUTO-ENTMCNC: 32.9 G/DL (ref 32–36)
MCV RBC AUTO: 86 FL (ref 82–98)
MICROSCOPIC COMMENT: ABNORMAL
NEUTROPHILS # BLD AUTO: 4.8 K/UL (ref 1.8–7.7)
NITRITE UR QL STRIP: POSITIVE
PH UR STRIP: 6 [PH] (ref 5–8)
PLATELET # BLD AUTO: 235 K/UL (ref 150–450)
PMV BLD AUTO: 11 FL (ref 9.2–12.9)
POTASSIUM SERPL-SCNC: 3.9 MMOL/L (ref 3.5–5.1)
PROT SERPL-MCNC: 7.1 G/DL (ref 6–8.4)
PROT UR QL STRIP: NEGATIVE
RBC # BLD AUTO: 4.74 M/UL (ref 4–5.4)
RBC #/AREA URNS HPF: 2 /HPF (ref 0–4)
SODIUM SERPL-SCNC: 141 MMOL/L (ref 136–145)
SP GR UR STRIP: 1.02 (ref 1–1.03)
SQUAMOUS #/AREA URNS HPF: 4 /HPF
URN SPEC COLLECT METH UR: ABNORMAL
UROBILINOGEN UR STRIP-ACNC: NEGATIVE EU/DL
WBC # BLD AUTO: 7.82 K/UL (ref 3.9–12.7)
WBC #/AREA URNS HPF: >100 /HPF (ref 0–5)
WBC CLUMPS URNS QL MICRO: ABNORMAL

## 2024-12-16 PROCEDURE — 80053 COMPREHEN METABOLIC PANEL: CPT | Performed by: INTERNAL MEDICINE

## 2024-12-16 PROCEDURE — 85027 COMPLETE CBC AUTOMATED: CPT | Performed by: INTERNAL MEDICINE

## 2024-12-16 PROCEDURE — 36415 COLL VENOUS BLD VENIPUNCTURE: CPT | Performed by: INTERNAL MEDICINE

## 2024-12-16 PROCEDURE — 81000 URINALYSIS NONAUTO W/SCOPE: CPT | Performed by: INTERNAL MEDICINE

## 2024-12-16 PROCEDURE — 82378 CARCINOEMBRYONIC ANTIGEN: CPT | Performed by: INTERNAL MEDICINE

## 2024-12-17 ENCOUNTER — PATIENT MESSAGE (OUTPATIENT)
Dept: HEMATOLOGY/ONCOLOGY | Facility: CLINIC | Age: 49
End: 2024-12-17

## 2024-12-17 ENCOUNTER — OFFICE VISIT (OUTPATIENT)
Dept: HEMATOLOGY/ONCOLOGY | Facility: CLINIC | Age: 49
End: 2024-12-17
Payer: COMMERCIAL

## 2024-12-17 DIAGNOSIS — N20.0 NEPHROLITHIASIS: ICD-10-CM

## 2024-12-17 DIAGNOSIS — D84.821 IMMUNODEFICIENCY SECONDARY TO CHEMOTHERAPY: ICD-10-CM

## 2024-12-17 DIAGNOSIS — D84.81 IMMUNODEFICIENCY SECONDARY TO NEOPLASM: ICD-10-CM

## 2024-12-17 DIAGNOSIS — Z79.899 IMMUNODEFICIENCY SECONDARY TO CHEMOTHERAPY: ICD-10-CM

## 2024-12-17 DIAGNOSIS — J30.9 ALLERGIC RHINITIS, UNSPECIFIED SEASONALITY, UNSPECIFIED TRIGGER: ICD-10-CM

## 2024-12-17 DIAGNOSIS — T45.1X5A IMMUNODEFICIENCY SECONDARY TO CHEMOTHERAPY: ICD-10-CM

## 2024-12-17 DIAGNOSIS — K58.1 IRRITABLE BOWEL SYNDROME WITH CONSTIPATION: ICD-10-CM

## 2024-12-17 DIAGNOSIS — I10 HYPERTENSION, UNSPECIFIED TYPE: ICD-10-CM

## 2024-12-17 DIAGNOSIS — C78.02 MALIGNANT NEOPLASM METASTATIC TO LEFT LUNG: ICD-10-CM

## 2024-12-17 DIAGNOSIS — D49.9 IMMUNODEFICIENCY SECONDARY TO NEOPLASM: ICD-10-CM

## 2024-12-17 DIAGNOSIS — C20 RECTAL CANCER: Primary | ICD-10-CM

## 2024-12-17 DIAGNOSIS — D50.9 MICROCYTIC ANEMIA: ICD-10-CM

## 2024-12-17 DIAGNOSIS — E78.5 HYPERLIPIDEMIA, UNSPECIFIED HYPERLIPIDEMIA TYPE: ICD-10-CM

## 2024-12-17 DIAGNOSIS — F41.1 GENERALIZED ANXIETY DISORDER: ICD-10-CM

## 2024-12-17 PROCEDURE — 1160F RVW MEDS BY RX/DR IN RCRD: CPT | Mod: CPTII,95,, | Performed by: REGISTERED NURSE

## 2024-12-17 PROCEDURE — G2211 COMPLEX E/M VISIT ADD ON: HCPCS | Mod: 95,,, | Performed by: REGISTERED NURSE

## 2024-12-17 PROCEDURE — 99215 OFFICE O/P EST HI 40 MIN: CPT | Mod: 95,,, | Performed by: REGISTERED NURSE

## 2024-12-17 PROCEDURE — 1159F MED LIST DOCD IN RCRD: CPT | Mod: CPTII,95,, | Performed by: REGISTERED NURSE

## 2024-12-17 NOTE — PROGRESS NOTES
The patient location is: [place], [place]  The chief complaint leading to consultation is: treatment follow up and lab review    Visit type: audiovisual    Each patient to whom he or she provides medical services by telemedicine is:  (1) informed of the relationship between the physician and patient and the respective role of any other health care provider with respect to management of the patient; and (2) notified that he or she may decline to receive medical services by telemedicine and may withdraw from such care at any time.    Notes:     MEDICAL ONCOLOGY - ESTABLISHED PATIENT VISIT    Reason for visit: rectal cancer     Best Contact Phone Number(s): [phone] ([place])      Cancer/Stage/TNM:    Cancer Staging   Rectal adenocarcinoma  Staging form: Colon and Rectum, AJCC 8th Edition  - Clinical stage from 6/21/2024: Stage NAVID (cT2, cN2b, cM1a) - Signed by Dion Rudolph MD on 7/24/2024  - Pathologic: No stage assigned - Unsigned       Oncology History   Rectal adenocarcinoma   6/21/2024 Procedure    Colonoscopy - mild diverticular disease, mass in distal rectum (Complete)     6/21/2024 Cancer Staged    Staging form: Colon and Rectum, AJCC 8th Edition  - Clinical stage from 6/21/2024: Stage NAVID (cT2, cN2b, cM1a)     6/24/2024 Initial Diagnosis    Rectal adenocarcinoma  ERROL  - Colonoscopy 6/21/24: infiltrative, ulcerated, sub-mucosal, fungating and villous bleeding mass found in distal rectum, 6-7cm from the anus proximally.   - Distal rectal mass biopsy 6/21/24: well differentiated adenocarcinoma, negative for LVI and perineural invasion. IHC MSI-H low probability with intact nuclear expressions of MLH1, MSH2, MSH6, and PMS2.        7/15/2024 Imaging Significant Findings    MRI Rectal cancer  Low rectal tumor invading the internal sphincter in close approximation to the mesorectal fascia without definite involvement.  Multiple suspicious mesorectal and superior rectal nodes.     *MR STAGE     T:  T1/T2 (tumor confined to rectal wall)     N: N+ (short axis ? 9 mm)     Blayne statement: Multiple suspicious mesorectal and superior rectal nodes.     *MRF: threatened (tumor margin within 1-2 mm of MRF) noting this is a low rectal tumor.  No definite evidence of invasion.     Sphincter Involvement: Yes involvement the internal sphincter     Suspicious Extramesorectal Lymph Nodes: No     EMVI: No     7/16/2024 Tumor Markers    Patient's tumor was tested for the following markers: CEA                                              Results of the tumor marker test revealed 2.9     7/17/2024 Imaging Significant Findings    CT CAP  In this patient with a known history of rectal malignancy, there are few borderline prominent lymph nodes adjacent to the rectum measuring up to 8 mm.  There are solid nodules in both lungs, the largest in the lingula measuring up to 1 cm.  Given the patient's primary malignancy, pulmonary metastatic disease cannot be excluded.  Follow-up based upon oncologic guidance.     Prominent soft tissue density in the anterior superior mediastinum which could possibly represent residual thymic tissue with a focal solid structure measuring up to 1.0 x 1.7 cm, possibly an enlarged lymph node versus other soft tissue mass.     Indeterminate focal regions of sclerosis within the T10 vertebra and right sacrum.  Osseous metastatic disease cannot be excluded.     Cholelithiasis without CT evidence for cholecystitis.     Large hiatal hernia.  Diverticulosis coli without diverticulitis.     Large left UPJ stone measuring up to 1.2 cm without hydronephrosis or hydroureter.     7/19/2024 Imaging Significant Findings    PET CT  Hypermetabolic soft tissue thickening along the rectal wall compatible with known malignancy.  There are a few prominent perirectal mesorectal lymph nodes without significant hypermetabolic activity, please note some may be too small for characterization by PET.     0.9 cm lingular pulmonary  nodule with mild hypermetabolic activity and prominent hypermetabolic mediastinal lymph node, concerning for metastatic foci.     8/12/2024 -  Chemotherapy    Treatment Summary   Plan Name: OP GI mFOLFOX6 (oxaliplatin leucovorin fluorouracil) with bevacizumab Q2W  Treatment Goal: Palliative  Status: Active  Start Date: 8/12/2024  End Date: 7/17/2025 (Planned)  Provider: Dion Rudoplh MD  Chemotherapy: oxaliplatin (ELOXATIN) 85 mg/m2 = 194 mg in D5W 603.8 mL chemo infusion, 85 mg/m2 = 194 mg, Intravenous, Clinic/HOD 1 time, 4 of 4 cycles  Administration: 194 mg (8/12/2024), 194 mg (8/28/2024), 194 mg (9/12/2024), 194 mg (9/25/2024)  fluorouracil (Adrucil) 2,400 mg/m2 = 5,470 mg in 0.9% NaCl 240 mL chemo infusion, 2,400 mg/m2 = 5,470 mg, Intravenous, Clinic/HOD 1 time, 8 of 24 cycles  Administration: 5,470 mg (8/12/2024), 5,470 mg (8/28/2024), 5,470 mg (9/12/2024), 5,470 mg (9/25/2024), 5,470 mg (10/23/2024), 5,470 mg (11/6/2024), 5,470 mg (11/20/2024), 5,470 mg (12/4/2024)  bevacizumab-awwb (MVASI) 500 mg in 0.9% NaCl 100 mL infusion, 550 mg, Intravenous, Clinic/HOD 1 time, 7 of 23 cycles  Administration: 500 mg (8/12/2024), 500 mg (8/28/2024), 500 mg (9/12/2024), 500 mg (9/25/2024), 500 mg (10/23/2024), 500 mg (11/6/2024), 500 mg (11/20/2024)        Interval History:   Ms. Segura returns for follow up prior to starting radiation treatment. Doing well overall. Feels fatigued but remains active and continues to work. Nausea has been better over the last few days. Bowels unchanged. She is anxious about what to expect once she starts radiation. No other concerns.    Patient presents to virtual visit alone. ECOG PS is 0. Scheduled to start radiation tomorrow, 12/18/24.    ROS:   Review of Systems   Constitutional:  Positive for malaise/fatigue. Negative for fever and weight loss.   HENT:  Negative for hearing loss.    Eyes:  Negative for blurred vision.   Respiratory:  Negative for cough, hemoptysis and shortness  "of breath.    Cardiovascular:  Negative for chest pain.   Gastrointestinal:  Positive for abdominal pain and nausea. Negative for blood in stool, constipation, diarrhea and vomiting.   Genitourinary:  Negative for dysuria, frequency and urgency.   Musculoskeletal:  Negative for back pain and myalgias.   Skin:  Negative for rash.   Neurological:  Negative for dizziness, tingling, weakness and headaches.   Endo/Heme/Allergies:  Does not bruise/bleed easily.   Psychiatric/Behavioral:  Negative for depression. The patient is not nervous/anxious.        Past Medical History:   Past Medical History:   Diagnosis Date    Anxiety disorder, unspecified     Fibroids     HTN (hypertension)     Sinus problem         Past Surgical History:   Past Surgical History:   Procedure Laterality Date    BILATERAL SALPINGO-OOPHORECTOMY (BSO) Bilateral 12/9/2020    Procedure: SALPINGO-OOPHORECTOMY, BILATERAL;  Surgeon: Yun Recio MD;  Location: [facility];  Service: OB/GYN;  Laterality: Bilateral;  COVID NEGATIVE 12/3    INCISION OF UVULA      INSERTION OF TUNNELED CENTRAL VENOUS CATHETER (CVC) WITH SUBCUTANEOUS PORT Right 7/29/2024    Procedure: INSERTION, SINGLE LUMEN CATHETER WITH PORT, WITH FLUOROSCOPIC GUIDANCE;  Surgeon: Russel Layton MD;  Location: [address];  Service: General;  Laterality: Right;    knee arhroscopy      NASAL SEPTUM SURGERY      SINUS SURGERY      TONSILLECTOMY      TOTAL ABDOMINAL HYSTERECTOMY N/A 12/9/2020    Procedure: HYSTERECTOMY, TOTAL, ABDOMINAL;  Surgeon: Yun Recio MD;  Location[facility];  Service: OB/GYN;  Laterality: N/A;    UTERINE FIBROID SURGERY          Family History:   Family History   Problem Relation Name Age of Onset    Asthma Mother      Hypertension Mother      COPD Father      Diabetes Father      Hypertension Father      Esophageal cancer Maternal Uncle Jossue     Colon polyps Maternal Grandfather          "every colonoscopy"    Bone cancer " Paternal Grandmother      Kidney cancer Maternal Aunt Sherlyn 59        kidney removal    Skin cancer Maternal Uncle Tim         possibly melanoma  recurrent    Throat cancer Maternal Uncle Medardo         late 20s - early 30s    Lung cancer Maternal Uncle Maksim         squamous cell  smoke exposure    Leukemia Maternal Uncle Kendall     Colon cancer Neg Hx          Social History:   Social History     Tobacco Use    Smoking status: Never    Smokeless tobacco: Never   Substance Use Topics    Alcohol use: Yes     Comment: Socially        I have reviewed and updated the patient's past medical, surgical, family and social histories.    Allergies:   Review of patient's allergies indicates:  No Known Allergies     Medications:   Current Outpatient Medications   Medication Sig Dispense Refill    cetirizine (ZYRTEC) 10 MG tablet Take 10 mg by mouth daily as needed.       cholecalciferol, vitamin D3, 5,000 unit Tab Take 5,000 Units by mouth once daily.      citalopram (CELEXA) 20 MG tablet Take 20 mg by mouth every morning.      EVAMIST 1.53 mg/spray (1.7%) transdermal spray SPRAY 2 SPRAYS TO FOREARM DAILY      fluticasone (FLONASE) 50 mcg/actuation nasal spray 1 spray (50 mcg total) by Each Nare route once daily. (Patient not taking: Reported on 9/10/2024) 16 g 0    hydroCHLOROthiazide (HYDRODIURIL) 25 MG tablet Take 25 mg by mouth every morning.      LIDOcaine-prilocaine (EMLA) cream Apply topically as needed. 30 g 3    LORazepam (ATIVAN) 0.5 MG tablet Take 1 tablet (0.5 mg total) by mouth every 12 (twelve) hours as needed for Anxiety. 5 tablet 0    niacin 50 MG Tab niacin      niacin 50 MG Tab       omega 3-dha-epa-fish oil (FISH OIL) 1,000 mg (120 mg-180 mg) Cap Take by mouth once daily.      ondansetron (ZOFRAN-ODT) 8 MG TbDL Take 1 tablet (8 mg total) by mouth every 6 (six) hours as needed (chemotherapy induced nausea/vomiting). 60 tablet 4    oxyCODONE (ROXICODONE) 10 mg Tab immediate release tablet  Take 1 tablet (10 mg total) by mouth every 4 (four) hours as needed. 180 tablet 0    rosuvastatin (CRESTOR) 10 MG tablet Take by mouth every evening.       No current facility-administered medications for this visit.      Physical Exam:   [facility] 11/18/2020          Physical Exam  Constitutional:       General: She is not in acute distress.     Appearance: Normal appearance. She is well-developed and normal weight. She is not ill-appearing, toxic-appearing or diaphoretic.      Comments: PE limited d/t nature of virtual visit   HENT:      Head: Normocephalic and atraumatic.      Right Ear: External ear normal.      Left Ear: External ear normal.   Eyes:      General: No scleral icterus.     Conjunctiva/sclera: Conjunctivae normal.   Pulmonary:      Effort: Pulmonary effort is normal. No respiratory distress.   Skin:     Capillary Refill: Capillary refill takes less than 2 seconds.   Neurological:      Mental Status: She is alert and oriented to person, place, and time.      Gait: Gait is intact.   Psychiatric:         Mood and Affect: Mood normal.         Behavior: Behavior normal.       Labs:   Recent Results (from the past 48 hours)   CBC Oncology    Collection Time: 12/16/24 12:15 PM   Result Value Ref Range    WBC 7.82 3.90 - 12.70 K/uL    RBC 4.74 4.00 - 5.40 M/uL    Hemoglobin 13.4 12.0 - 16.0 g/dL    Hematocrit 40.7 37.0 - 48.5 %    MCV 86 82 - 98 fL    MCH 28.3 27.0 - 31.0 pg    MCHC 32.9 32.0 - 36.0 g/dL    RDW 16.2 (H) 11.5 - 14.5 %    Platelets 235 150 - 450 K/uL    MPV 11.0 9.2 - 12.9 fL    Gran # (ANC) 4.8 1.8 - 7.7 K/uL    Immature Grans (Abs) 0.03 0.00 - 0.04 K/uL   Comprehensive Metabolic Panel    Collection Time: 12/16/24 12:15 PM   Result Value Ref Range    Sodium 141 136 - 145 mmol/L    Potassium 3.9 3.5 - 5.1 mmol/L    Chloride 102 95 - 110 mmol/L    CO2 28 23 - 29 mmol/L    Glucose 111 (H) 70 - 110 mg/dL    BUN 14 6 - 20 mg/dL    Creatinine 0.9 0.5 - 1.4 mg/dL    Calcium 9.9 8.7 - 10.5 mg/dL     Total Protein 7.1 6.0 - 8.4 g/dL    Albumin 3.7 3.5 - 5.2 g/dL    Total Bilirubin 0.4 0.1 - 1.0 mg/dL    Alkaline Phosphatase 136 40 - 150 U/L    AST 27 10 - 40 U/L    ALT 40 10 - 44 U/L    eGFR >60 >60 mL/min/1.73 m^2    Anion Gap 11 8 - 16 mmol/L   CEA    Collection Time: 12/16/24 12:15 PM   Result Value Ref Range    CEA 2.0 0.0 - 5.0 ng/mL   Urinalysis    Collection Time: 12/16/24 12:15 PM   Result Value Ref Range    Specimen UA Urine, Clean Catch     Color, UA Yellow Yellow, Straw, Norma    Appearance, UA Hazy (A) Clear    pH, UA 6.0 5.0 - 8.0    Specific Gravity, UA 1.020 1.005 - 1.030    Protein, UA Negative Negative    Glucose, UA Negative Negative    Ketones, UA Negative Negative    Bilirubin (UA) Negative Negative    Occult Blood UA Negative Negative    Nitrite, UA Positive (A) Negative    Urobilinogen, UA Negative <2.0 EU/dL    Leukocytes, UA 2+ (A) Negative   Urinalysis Microscopic    Collection Time: 12/16/24 12:15 PM   Result Value Ref Range    RBC, UA 2 0 - 4 /hpf    WBC, UA >100 (H) 0 - 5 /hpf    WBC Clumps, UA Occasional (A) None-Rare    Bacteria Many (A) None-Occ /hpf    Squam Epithel, UA 4 /hpf    Microscopic Comment SEE COMMENT      Labs reviewed.     Imaging:      MRI 10/31/2024:     Impression:     Motion degraded sequences.     *TUMOR     Compared to MRI 07/15/2024, post treatment primary tumor assessment: Probable Incomplete response (likely residual tumor)     Improving heterogeneous signal and wall thickening of the rectum with some residual restricted diffusion which suggests residual tumor.  Improvement of the size of multiple mesorectal lymph nodes.     *NODES     Suspicious Mesorectal Lymph nodes: No     Suspicious Extramesorectal Lymph nodes: No    CT CAP - 10/21/24:    Impression:     In this patient with a known history of rectal malignancy, at the perirectal lymph nodes have significantly decreased in size, for example the 8 mm lymph node now measures 4 mm.  Additionally, the  pulmonary nodules are also either resolved or significantly decreased in size.  For example the spiculated nodule in the lingula previously measured 1 cm and currently measures 0.5 cm.     Soft tissue density nodule in the anterior mediastinum measures 1.7 cm, stable.     Focal regions of sclerosis in the right roxann sacrum and T10 vertebra, stable and remaining indeterminate.     Cholelithiasis.     Large hiatal hernia.     Interval placement of a left ureteral stent    Path:   Distal rectal mass biopsy 6/21/24: well differentiated adenocarcinoma, negative for LVI and perineural invasion. IHC MSI-H low probability with intact nuclear expressions of MLH1, MSH2, MSH6, and PMS2.       Assessment:       1. Rectal cancer    2. Malignant neoplasm metastatic to left lung    3. 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 Microcytic anemia    6. Nephrolithiasis    7. Generalized anxiety disorder    8. Hypertension, unspecified type    9. Hyperlipidemia, unspecified hyperlipidemia type    10. Allergic rhinitis, unspecified seasonality, unspecified trigger    11. Irritable bowel syndrome with constipation       Plan:         # Rectal adenocarcinoma  Imelda Kimble is a 49 y.o. woman with distal rectal G1 adenocarcinoma pMMR, 5.6 cm long, below the anterior peritoneal reflection, MRF threatened, with involvement the internal sphincter, EMVI-, with multiple (8+) small suspicious mesorectal and superior rectal nodes.     She was in her usual health and went to PCP for annual screening in 2024. PCP recommended screening colonoscopy as she is >45. 2 weeks prior to her screening colonoscopy, she noticed hematochezia and rectal pain. Her only symptom prior to this was constipation, which is lifelong. Found to have distal rectal tumor as above.    PET/CT 7/19/24 showed hypermetabolism of the primary rectal tumor with small mesorectal lymph nodes.  Additional 9mm L lung nodule and  mediastinal lymph node were hypermetabolic.    We discussed her at tumor board with recommendations for induction chemotherapy and then potential local therapy (chemoradiation) to rectum and SBRT/surgery to lung.    Had port placed on 7/29/24    Received cycle 1 FOLFOX + Avastin on 8/12/24. Ms. Segura returns prior to cycle 6 of FOLFOX + bevacizumab, now on maintenance 5-FU plus avastin. Last dose of Oxaliplatin was given on 9/25/2024  Repeat imaging after cycle 4 shows good response in mesorectal lymph nodes and L lingular lung metastasis.  Had restaging MRI of pelvis rectum that displays very good response to treatment. Reviewed by Dr Rudolph and Dr. Menendez and will discuss moving towards consolidative chemoradiation to her rectum. Will present at colorectal tumor board after MRI.    Doing well. Received 8 cycles of chemo, 5-FU only with last cycle.   I have reviewed the CBC and CMP, adequate for treatment   UA stable. No urinary symptoms.   Received pain medication in preparation for RT during previous visit.     RTC in 3 weeks as scheduled.   Pt would like to continue treatment in [place].   Will resume maintenance chemotherapy as well with 5-FU/avastin in ~3 weeks.   Repeat imaging in ~5 weeks.     # Microcytic anemia, nephrolithiasis  Suspect MICHELLE. Counts normalized today. Monitor.    Doing better. Ureteral stent was removed on 10/28/2024. No urinary symptoms at this time.     Other chronic disease managed by PCP   # Generalized anxiety disorder - continue citalopram   # HTN - HCTZ   # HLD - statin  # allergic rhinitis - zyrtec and flonase, stable   # IBS-Constipation - Stable    Follow up: 3 weeks to resume chemo.     Patient is in agreement with the proposed treatment plan. All questions were answered to the patient's satisfaction. Pt knows to call clinic if anything is needed before the next clinic visit.    Patient discussed with collaborating physician, Dr. Rudolph.    At least 40 minutes were spent  today on this encounter including face to face time with the patient, data gathering/interpretation and documentation.       Eliane Pozo, MSN, APRN, ACCNS-AG  Hematology and Medical Oncology  Clinical Nurse Specialist to Dr. Rudolph, Dr. Vázquez & Dr. Merino Chart for Scheduling    Med Onc Chart Routing      Follow up with physician . 5 weeks with labs and scans 1-2 days prior to see Dr. Rudolph for chemo   Follow up with LEIDA 3 weeks. as scheduled with labs prior - will need chemo scheduled following visit please. can make visit virtual as well.   Infusion scheduling note   chemo to restart after 1/13/25 visit (she typically goes to [facility])   Injection scheduling note    Labs CBC, CMP, CEA and urinalysis   Scheduling:  Preferred lab:  Lab interval:     Imaging CT chest abdomen pelvis   prior to MD visit in 5 weeks   Pharmacy appointment    Other referrals          Treatment Plan Information   OP GI mFOLFOX6 (oxaliplatin leucovorin fluorouracil) with bevacizumab Q2W Dion Rudolph MD   Associated diagnosis: Rectal adenocarcinoma Stage NAVID cT2, cN2b, cM1a noted on 7/15/2024   Line of treatment: First Line  Treatment Goal: Palliative     Upcoming Treatment Dates - OP GI mFOLFOX6 (oxaliplatin leucovorin fluorouracil) with bevacizumab Q2W    12/17/2024       Chemotherapy       bevacizumab-awwb (MVASI) 5 mg/kg = 550 mg in 0.9% NaCl 100 mL infusion       fluorouracil (Adrucil) 2,400 mg/m2 = 5,470 mg in 0.9% NaCl 240 mL chemo infusion       Antiemetics       palonosetron (ALOXI) 0.25 mg with Dexamethasone (DECADRON) 12 mg in NS 50 mL IVPB  12/31/2024       Chemotherapy       bevacizumab-awwb (MVASI) 5 mg/kg = 550 mg in 0.9% NaCl 100 mL infusion       fluorouracil (Adrucil) 2,400 mg/m2 = 5,470 mg in 0.9% NaCl 240 mL chemo infusion       Antiemetics       palonosetron (ALOXI) 0.25 mg with Dexamethasone (DECADRON) 12 mg in NS 50 mL IVPB  1/14/2025       Chemotherapy       bevacizumab-awwb (MVASI) 5  mg/kg = 550 mg in 0.9% NaCl 100 mL infusion       fluorouracil (Adrucil) 2,400 mg/m2 = 5,470 mg in 0.9% NaCl 240 mL chemo infusion       Antiemetics       palonosetron (ALOXI) 0.25 mg with Dexamethasone (DECADRON) 12 mg in NS 50 mL IVPB  1/28/2025       Chemotherapy       bevacizumab-awwb (MVASI) 5 mg/kg = 550 mg in 0.9% NaCl 100 mL infusion       fluorouracil (Adrucil) 2,400 mg/m2 = 5,470 mg in 0.9% NaCl 240 mL chemo infusion       Antiemetics       palonosetron (ALOXI) 0.25 mg with Dexamethasone (DECADRON) 12 mg in NS 50 mL IVPB

## 2024-12-18 ENCOUNTER — TELEPHONE (OUTPATIENT)
Dept: HEMATOLOGY/ONCOLOGY | Facility: CLINIC | Age: 49
End: 2024-12-18
Payer: COMMERCIAL

## 2024-12-20 ENCOUNTER — DOCUMENTATION ONLY (OUTPATIENT)
Dept: RADIATION ONCOLOGY | Facility: CLINIC | Age: 49
End: 2024-12-20
Payer: COMMERCIAL

## 2024-12-26 ENCOUNTER — LAB VISIT (OUTPATIENT)
Dept: LAB | Facility: HOSPITAL | Age: 49
End: 2024-12-26
Attending: INTERNAL MEDICINE
Payer: COMMERCIAL

## 2024-12-26 DIAGNOSIS — C20 RECTAL ADENOCARCINOMA: ICD-10-CM

## 2024-12-26 DIAGNOSIS — C20 RECTAL CANCER: ICD-10-CM

## 2024-12-26 LAB
ALBUMIN SERPL BCP-MCNC: 3.7 G/DL (ref 3.5–5.2)
ALP SERPL-CCNC: 103 U/L (ref 40–150)
ALT SERPL W/O P-5'-P-CCNC: 26 U/L (ref 10–44)
ANION GAP SERPL CALC-SCNC: 11 MMOL/L (ref 8–16)
AST SERPL-CCNC: 26 U/L (ref 10–40)
BACTERIA #/AREA URNS HPF: ABNORMAL /HPF
BILIRUB SERPL-MCNC: 0.5 MG/DL (ref 0.1–1)
BILIRUB UR QL STRIP: NEGATIVE
BUN SERPL-MCNC: 14 MG/DL (ref 6–20)
CALCIUM SERPL-MCNC: 9.3 MG/DL (ref 8.7–10.5)
CEA SERPL-MCNC: 2 NG/ML (ref 0–5)
CHLORIDE SERPL-SCNC: 101 MMOL/L (ref 95–110)
CLARITY UR: CLEAR
CO2 SERPL-SCNC: 28 MMOL/L (ref 23–29)
COLOR UR: YELLOW
CREAT SERPL-MCNC: 0.8 MG/DL (ref 0.5–1.4)
ERYTHROCYTE [DISTWIDTH] IN BLOOD BY AUTOMATED COUNT: 15.6 % (ref 11.5–14.5)
EST. GFR  (NO RACE VARIABLE): >60 ML/MIN/1.73 M^2
GLUCOSE SERPL-MCNC: 87 MG/DL (ref 70–110)
GLUCOSE UR QL STRIP: NEGATIVE
HCT VFR BLD AUTO: 39.1 % (ref 37–48.5)
HGB BLD-MCNC: 13 G/DL (ref 12–16)
HGB UR QL STRIP: ABNORMAL
IMM GRANULOCYTES # BLD AUTO: 0.03 K/UL (ref 0–0.04)
KETONES UR QL STRIP: NEGATIVE
LEUKOCYTE ESTERASE UR QL STRIP: ABNORMAL
MCH RBC QN AUTO: 28.8 PG (ref 27–31)
MCHC RBC AUTO-ENTMCNC: 33.2 G/DL (ref 32–36)
MCV RBC AUTO: 87 FL (ref 82–98)
MICROSCOPIC COMMENT: ABNORMAL
NEUTROPHILS # BLD AUTO: 2.7 K/UL (ref 1.8–7.7)
NITRITE UR QL STRIP: POSITIVE
PH UR STRIP: 5 [PH] (ref 5–8)
PLATELET # BLD AUTO: 181 K/UL (ref 150–450)
PMV BLD AUTO: 11.1 FL (ref 9.2–12.9)
POTASSIUM SERPL-SCNC: 4 MMOL/L (ref 3.5–5.1)
PROT SERPL-MCNC: 7.1 G/DL (ref 6–8.4)
PROT UR QL STRIP: ABNORMAL
RBC # BLD AUTO: 4.51 M/UL (ref 4–5.4)
RBC #/AREA URNS HPF: 0 /HPF (ref 0–4)
SODIUM SERPL-SCNC: 140 MMOL/L (ref 136–145)
SP GR UR STRIP: 1.01 (ref 1–1.03)
SQUAMOUS #/AREA URNS HPF: 5 /HPF
URN SPEC COLLECT METH UR: ABNORMAL
UROBILINOGEN UR STRIP-ACNC: NEGATIVE EU/DL
WBC # BLD AUTO: 3.82 K/UL (ref 3.9–12.7)
WBC #/AREA URNS HPF: 50 /HPF (ref 0–5)
WBC CLUMPS URNS QL MICRO: ABNORMAL

## 2024-12-26 PROCEDURE — 82378 CARCINOEMBRYONIC ANTIGEN: CPT | Performed by: INTERNAL MEDICINE

## 2024-12-26 PROCEDURE — 85027 COMPLETE CBC AUTOMATED: CPT | Performed by: INTERNAL MEDICINE

## 2024-12-26 PROCEDURE — 36415 COLL VENOUS BLD VENIPUNCTURE: CPT | Performed by: INTERNAL MEDICINE

## 2024-12-26 PROCEDURE — 81000 URINALYSIS NONAUTO W/SCOPE: CPT | Performed by: INTERNAL MEDICINE

## 2024-12-26 PROCEDURE — 80053 COMPREHEN METABOLIC PANEL: CPT | Performed by: INTERNAL MEDICINE

## 2024-12-26 NOTE — TELEPHONE ENCOUNTER
Date of Service:  24  Visit Provider:  Stepan Perales, [facility] [place] [facility] [place]  Collaborating Physician:  Yan Musa MD    Patient ID  Name: Imelda Kimble    : 1975    MRN: [id number]      Referring Provider:   Rajan Díaz MD  [address] [place] [address]    Impression    [facility] (+RNA insight) panel genetic testing was negative for actionable mutations in 39 genes associated with hereditary breast, gastrointestinal, gynecologic, pancreatic, prostate and skin cancers. Results were disclosed over the phone on 24.    Discussion    Genetic Test Results    Imelda had a sample submitted on 24 to [facility] for  panel testing. This panel includes sequencing and/or deletion/duplication analysis of the following 39 genes:    APC, ZE, AXIN2, BAP1, BARD1, BMPR1A, BRCA1, BRCA2, BRIP1, CDH1, CDK4, CDKN2A, CHEK2, EPCAM, FH, FLCN, GREM1, HOXB13, MBD4, MET, MLH1, MSH2, MSH3, MSH6, MUTYH, NF1, NTHL1, PALB2, PMS2, POLD1, POLE, PTEN, RAD51C, RAD51D, SMAD4, STK11, TP53, TSC1, TSC2, VHL    The results were negative for actionable mutations in any of these genes. This is considered a negative result, but it does not completely rule out a hereditary form of cancer in her family. Some individuals/families with cancer may have a mutation in a gene that is not included in this panel, and some may have mutations in one of these genes that is not detectable with our current technology. It could also be that Imelda's personal and family history of cancer is not due to a hereditary cause.     Given Imelda's negative results, the likelihood of a hereditary form of cancer has been drastically reduced for her and her family. She has undergone comprehensive hereditary cancer genetic testing, and no further genetic testing is recommended at this time.    Cancer Risk Management  Breast (National Comprehensive Cancer Network)  Breast awareness - be familiar with your breasts and report any changes to your healthcare  providers.   Yearly mammogram   Consider extra screening for those with dense breasts.  Colorectal (National Comprehensive Cancer Network)  People age 45-75* at average risk** should have screening through one of the following methods. If negative/no polyps, repeat as shown based on the type of screening completed. If positive/polyps or if symptoms develop, the type of screening and timeframe for repeat screening may change.   Colonoscopy. Screen again in 10 years.  Flexible sigmoidoscopy. Screen again in 5-10 years.  CT colonography. Screen again in 5 years.   Stool testing (looking for blood). Screen again in 1 year.  Stool testing (DNA based). Screen again in 3 years.   *Screening may be offered over age 75 based on specific factors.    **Personal history of colon polyps, inflammatory bowel disease, or cystic fibrosis, or family history of colorectal polyps or cancer may increase the risk of colorectal cancer and have different screening recommendations.  Cervical (American College of Obstetricians and Gynecologists)   [Age 30-65] HPV test (with or without Pap test) every 5 years OR Pap test every 3 years.  [After age 65] No screening recommended if prior screening was normal and no high-risk factors.   Individual risk factors and abnormal previous screening can alter these recommendations.   Skin (American Academy of Dermatology)  Complete regular skin self-exams and report any new spots to a healthcare provider.     Family Members    Imelda's close female relatives may still be at increased risk of breast cancer given her diagnosis. However, it is not recommended for unaffected individuals to undergo genetic testing at this time.    Imelda received comprehensive genetic counseling regarding her negative genetic test results. Benefits and limitations were discussed, and she was provided with an electronic copy of her results report. Imelda is encouraged to contact cancer genetics if there are any updates to her  personal or family history, or if she has any questions or concerns.    This assessment is based on the history and reports provided, as well as the current scientific knowledge regarding cancer genetics.    IRISH Diehl, [facility] [place]  Certified Genetic Counselor  Department of Hematology and Oncology  [facility]

## 2025-01-10 ENCOUNTER — LAB VISIT (OUTPATIENT)
Dept: LAB | Facility: HOSPITAL | Age: 50
End: 2025-01-10
Attending: INTERNAL MEDICINE
Payer: COMMERCIAL

## 2025-01-10 DIAGNOSIS — C20 RECTAL ADENOCARCINOMA: ICD-10-CM

## 2025-01-10 DIAGNOSIS — C20 RECTAL CANCER: ICD-10-CM

## 2025-01-10 LAB
ALBUMIN SERPL BCP-MCNC: 3.8 G/DL (ref 3.5–5.2)
ALP SERPL-CCNC: 125 U/L (ref 40–150)
ALT SERPL W/O P-5'-P-CCNC: 21 U/L (ref 10–44)
ANION GAP SERPL CALC-SCNC: 10 MMOL/L (ref 8–16)
AST SERPL-CCNC: 19 U/L (ref 10–40)
BACTERIA #/AREA URNS HPF: ABNORMAL /HPF
BASOPHILS # BLD AUTO: 0.04 K/UL (ref 0–0.2)
BASOPHILS NFR BLD: 0.7 % (ref 0–1.9)
BILIRUB SERPL-MCNC: 0.4 MG/DL (ref 0.1–1)
BILIRUB UR QL STRIP: NEGATIVE
BUN SERPL-MCNC: 15 MG/DL (ref 6–20)
CALCIUM SERPL-MCNC: 9.6 MG/DL (ref 8.7–10.5)
CEA SERPL-MCNC: 2.1 NG/ML (ref 0–5)
CHLORIDE SERPL-SCNC: 103 MMOL/L (ref 95–110)
CLARITY UR: CLEAR
CO2 SERPL-SCNC: 27 MMOL/L (ref 23–29)
COLOR UR: YELLOW
CREAT SERPL-MCNC: 0.8 MG/DL (ref 0.5–1.4)
DIFFERENTIAL METHOD BLD: ABNORMAL
EOSINOPHIL # BLD AUTO: 0.2 K/UL (ref 0–0.5)
EOSINOPHIL NFR BLD: 4.5 % (ref 0–8)
ERYTHROCYTE [DISTWIDTH] IN BLOOD BY AUTOMATED COUNT: 14.7 % (ref 11.5–14.5)
EST. GFR  (NO RACE VARIABLE): >60 ML/MIN/1.73 M^2
GLUCOSE SERPL-MCNC: 99 MG/DL (ref 70–110)
GLUCOSE UR QL STRIP: ABNORMAL
HCT VFR BLD AUTO: 40.2 % (ref 37–48.5)
HGB BLD-MCNC: 13.4 G/DL (ref 12–16)
HGB UR QL STRIP: NEGATIVE
HYALINE CASTS #/AREA URNS LPF: 0 /LPF
IMM GRANULOCYTES # BLD AUTO: 0.02 K/UL (ref 0–0.04)
IMM GRANULOCYTES NFR BLD AUTO: 0.4 % (ref 0–0.5)
KETONES UR QL STRIP: NEGATIVE
LEUKOCYTE ESTERASE UR QL STRIP: ABNORMAL
LYMPHOCYTES # BLD AUTO: 0.9 K/UL (ref 1–4.8)
LYMPHOCYTES NFR BLD: 16 % (ref 18–48)
MCH RBC QN AUTO: 28.4 PG (ref 27–31)
MCHC RBC AUTO-ENTMCNC: 33.3 G/DL (ref 32–36)
MCV RBC AUTO: 85 FL (ref 82–98)
MICROSCOPIC COMMENT: ABNORMAL
MONOCYTES # BLD AUTO: 0.5 K/UL (ref 0.3–1)
MONOCYTES NFR BLD: 9.9 % (ref 4–15)
NEUTROPHILS # BLD AUTO: 3.7 K/UL (ref 1.8–7.7)
NEUTROPHILS NFR BLD: 68.5 % (ref 38–73)
NITRITE UR QL STRIP: POSITIVE
NRBC BLD-RTO: 0 /100 WBC
PH UR STRIP: 7 [PH] (ref 5–8)
PLATELET # BLD AUTO: 203 K/UL (ref 150–450)
PMV BLD AUTO: 10.5 FL (ref 9.2–12.9)
POTASSIUM SERPL-SCNC: 3.8 MMOL/L (ref 3.5–5.1)
PROT SERPL-MCNC: 7.4 G/DL (ref 6–8.4)
PROT UR QL STRIP: ABNORMAL
RBC # BLD AUTO: 4.72 M/UL (ref 4–5.4)
RBC #/AREA URNS HPF: 4 /HPF (ref 0–4)
SODIUM SERPL-SCNC: 140 MMOL/L (ref 136–145)
SP GR UR STRIP: 1.01 (ref 1–1.03)
SQUAMOUS #/AREA URNS HPF: 20 /HPF
URN SPEC COLLECT METH UR: ABNORMAL
UROBILINOGEN UR STRIP-ACNC: NEGATIVE EU/DL
WBC # BLD AUTO: 5.38 K/UL (ref 3.9–12.7)
WBC #/AREA URNS HPF: 73 /HPF (ref 0–5)
WBC CLUMPS URNS QL MICRO: ABNORMAL

## 2025-01-10 PROCEDURE — 82378 CARCINOEMBRYONIC ANTIGEN: CPT | Performed by: PHYSICIAN ASSISTANT

## 2025-01-10 PROCEDURE — 36415 COLL VENOUS BLD VENIPUNCTURE: CPT | Performed by: PHYSICIAN ASSISTANT

## 2025-01-10 PROCEDURE — 81000 URINALYSIS NONAUTO W/SCOPE: CPT | Performed by: INTERNAL MEDICINE

## 2025-01-10 PROCEDURE — 85025 COMPLETE CBC W/AUTO DIFF WBC: CPT | Performed by: PHYSICIAN ASSISTANT

## 2025-01-10 PROCEDURE — 80053 COMPREHEN METABOLIC PANEL: CPT | Performed by: PHYSICIAN ASSISTANT

## 2025-01-13 ENCOUNTER — OFFICE VISIT (OUTPATIENT)
Dept: HEMATOLOGY/ONCOLOGY | Facility: CLINIC | Age: 50
End: 2025-01-13
Payer: COMMERCIAL

## 2025-01-13 DIAGNOSIS — C20 RECTAL CANCER: Primary | ICD-10-CM

## 2025-01-13 DIAGNOSIS — D50.9 MICROCYTIC ANEMIA: ICD-10-CM

## 2025-01-13 DIAGNOSIS — Z79.899 IMMUNODEFICIENCY SECONDARY TO CHEMOTHERAPY: ICD-10-CM

## 2025-01-13 DIAGNOSIS — E78.5 HYPERLIPIDEMIA, UNSPECIFIED HYPERLIPIDEMIA TYPE: ICD-10-CM

## 2025-01-13 DIAGNOSIS — K58.1 IRRITABLE BOWEL SYNDROME WITH CONSTIPATION: ICD-10-CM

## 2025-01-13 DIAGNOSIS — J30.9 ALLERGIC RHINITIS, UNSPECIFIED SEASONALITY, UNSPECIFIED TRIGGER: ICD-10-CM

## 2025-01-13 DIAGNOSIS — N20.0 NEPHROLITHIASIS: ICD-10-CM

## 2025-01-13 DIAGNOSIS — D49.9 IMMUNODEFICIENCY SECONDARY TO NEOPLASM: ICD-10-CM

## 2025-01-13 DIAGNOSIS — I10 HYPERTENSION, UNSPECIFIED TYPE: ICD-10-CM

## 2025-01-13 DIAGNOSIS — F41.1 GENERALIZED ANXIETY DISORDER: ICD-10-CM

## 2025-01-13 DIAGNOSIS — D84.821 IMMUNODEFICIENCY SECONDARY TO CHEMOTHERAPY: ICD-10-CM

## 2025-01-13 DIAGNOSIS — C78.02 MALIGNANT NEOPLASM METASTATIC TO LEFT LUNG: ICD-10-CM

## 2025-01-13 DIAGNOSIS — N30.90 CYSTITIS: ICD-10-CM

## 2025-01-13 DIAGNOSIS — D84.81 IMMUNODEFICIENCY SECONDARY TO NEOPLASM: ICD-10-CM

## 2025-01-13 DIAGNOSIS — T45.1X5A IMMUNODEFICIENCY SECONDARY TO CHEMOTHERAPY: ICD-10-CM

## 2025-01-13 RX ORDER — PHENAZOPYRIDINE HYDROCHLORIDE 200 MG/1
200 TABLET, FILM COATED ORAL
Qty: 30 TABLET | Refills: 2 | Status: SHIPPED | OUTPATIENT
Start: 2025-01-13

## 2025-01-13 NOTE — PROGRESS NOTES
The patient location is: [place], [place]  The chief complaint leading to consultation is: treatment follow up and lab review    Visit type: audiovisual    Each patient to whom he or she provides medical services by telemedicine is:  (1) informed of the relationship between the physician and patient and the respective role of any other health care provider with respect to management of the patient; and (2) notified that he or she may decline to receive medical services by telemedicine and may withdraw from such care at any time.    Notes:     MEDICAL ONCOLOGY - ESTABLISHED PATIENT VISIT    Reason for visit: rectal cancer     Best Contact Phone Number(s): [phone] ([place])      Cancer/Stage/TNM:    Cancer Staging   Rectal adenocarcinoma  Staging form: Colon and Rectum, AJCC 8th Edition  - Clinical stage from 6/21/2024: Stage NAVID (cT2, cN2b, cM1a) - Signed by Dion Rudolph MD on 7/24/2024  - Pathologic: No stage assigned - Unsigned       Oncology History   Rectal adenocarcinoma   6/21/2024 Procedure    Colonoscopy - mild diverticular disease, mass in distal rectum (Complete)     6/21/2024 Cancer Staged    Staging form: Colon and Rectum, AJCC 8th Edition  - Clinical stage from 6/21/2024: Stage NAVID (cT2, cN2b, cM1a)     6/24/2024 Initial Diagnosis    Rectal adenocarcinoma  ERROL  - Colonoscopy 6/21/24: infiltrative, ulcerated, sub-mucosal, fungating and villous bleeding mass found in distal rectum, 6-7cm from the anus proximally.   - Distal rectal mass biopsy 6/21/24: well differentiated adenocarcinoma, negative for LVI and perineural invasion. IHC MSI-H low probability with intact nuclear expressions of MLH1, MSH2, MSH6, and PMS2.        7/15/2024 Imaging Significant Findings    MRI Rectal cancer  Low rectal tumor invading the internal sphincter in close approximation to the mesorectal fascia without definite involvement.  Multiple suspicious mesorectal and superior rectal nodes.     *MR STAGE     T:  T1/T2 (tumor confined to rectal wall)     N: N+ (short axis ? 9 mm)     Blayne statement: Multiple suspicious mesorectal and superior rectal nodes.     *MRF: threatened (tumor margin within 1-2 mm of MRF) noting this is a low rectal tumor.  No definite evidence of invasion.     Sphincter Involvement: Yes involvement the internal sphincter     Suspicious Extramesorectal Lymph Nodes: No     EMVI: No     7/16/2024 Tumor Markers    Patient's tumor was tested for the following markers: CEA                                              Results of the tumor marker test revealed 2.9     7/17/2024 Imaging Significant Findings    CT CAP  In this patient with a known history of rectal malignancy, there are few borderline prominent lymph nodes adjacent to the rectum measuring up to 8 mm.  There are solid nodules in both lungs, the largest in the lingula measuring up to 1 cm.  Given the patient's primary malignancy, pulmonary metastatic disease cannot be excluded.  Follow-up based upon oncologic guidance.     Prominent soft tissue density in the anterior superior mediastinum which could possibly represent residual thymic tissue with a focal solid structure measuring up to 1.0 x 1.7 cm, possibly an enlarged lymph node versus other soft tissue mass.     Indeterminate focal regions of sclerosis within the T10 vertebra and right sacrum.  Osseous metastatic disease cannot be excluded.     Cholelithiasis without CT evidence for cholecystitis.     Large hiatal hernia.  Diverticulosis coli without diverticulitis.     Large left UPJ stone measuring up to 1.2 cm without hydronephrosis or hydroureter.     7/19/2024 Imaging Significant Findings    PET CT  Hypermetabolic soft tissue thickening along the rectal wall compatible with known malignancy.  There are a few prominent perirectal mesorectal lymph nodes without significant hypermetabolic activity, please note some may be too small for characterization by PET.     0.9 cm lingular pulmonary  nodule with mild hypermetabolic activity and prominent hypermetabolic mediastinal lymph node, concerning for metastatic foci.     8/12/2024 -  Chemotherapy    Treatment Summary   Plan Name: OP GI mFOLFOX6 (oxaliplatin leucovorin fluorouracil) with bevacizumab Q2W  Treatment Goal: Palliative  Status: Active  Start Date: 8/12/2024  End Date: 7/17/2025 (Planned)  Provider: Dion Rudolph MD  Chemotherapy: oxaliplatin (ELOXATIN) 85 mg/m2 = 194 mg in D5W 603.8 mL chemo infusion, 85 mg/m2 = 194 mg, Intravenous, Clinic/HOD 1 time, 4 of 4 cycles  Administration: 194 mg (8/12/2024), 194 mg (8/28/2024), 194 mg (9/12/2024), 194 mg (9/25/2024)  fluorouracil (Adrucil) 2,400 mg/m2 = 5,470 mg in 0.9% NaCl 240 mL chemo infusion, 2,400 mg/m2 = 5,470 mg, Intravenous, Clinic/HOD 1 time, 8 of 24 cycles  Administration: 5,470 mg (8/12/2024), 5,470 mg (8/28/2024), 5,470 mg (9/12/2024), 5,470 mg (9/25/2024), 5,470 mg (10/23/2024), 5,470 mg (11/6/2024), 5,470 mg (11/20/2024), 5,470 mg (12/4/2024)  bevacizumab-awwb (MVASI) 500 mg in 0.9% NaCl 100 mL infusion, 550 mg, Intravenous, Clinic/HOD 1 time, 7 of 23 cycles  Administration: 500 mg (8/12/2024), 500 mg (8/28/2024), 500 mg (9/12/2024), 500 mg (9/25/2024), 500 mg (10/23/2024), 500 mg (11/6/2024), 500 mg (11/20/2024)     12/18/2024 - 12/24/2024 Radiation Therapy    Treating physician: Kevin Lopez  Course: C1 Pelvis 2024  Treatment Site Ref. ID Energy Dose/Fx (Gy) #Fx Dose Correction (Gy) Total Dose (Gy) Start Date End Date Elapsed Days   IM Rectum PTV_High 6X 5 5 / 5 0 25 12/18/2024 12/24/2024 6           Interval History:   Ms. Segura returns for follow up after completing radiation on 12/24/24. Starting to feel better since completion. Remains fatigued, but stable. Her main complaint is GI concerns. Reports alternating diarrhea and constipation with nausea. She went to urgent care for burning with urination but UA unremarkable. Trying to increase fluid intake. Denies fevers.  Had 1 day of bright red blood in stool, otherwise no overt bleeding.     Patient presents to virtual visit alone. ECOG PS is 0.    ROS:   Review of Systems   Constitutional:  Positive for malaise/fatigue. Negative for chills, fever and weight loss.   HENT:  Negative for hearing loss.    Eyes:  Negative for blurred vision.   Respiratory:  Negative for cough, hemoptysis and shortness of breath.    Cardiovascular:  Negative for chest pain.   Gastrointestinal:  Positive for abdominal pain, constipation, diarrhea and nausea. Negative for blood in stool and vomiting.   Genitourinary:  Negative for dysuria, frequency and urgency.   Musculoskeletal:  Negative for back pain and myalgias.   Skin:  Negative for rash.   Neurological:  Negative for dizziness, tingling, weakness and headaches.   Endo/Heme/Allergies:  Does not bruise/bleed easily.   Psychiatric/Behavioral:  Negative for depression. The patient is not nervous/anxious.        Past Medical History:   Past Medical History:   Diagnosis Date    Anxiety disorder, unspecified     Fibroids     HTN (hypertension)     Sinus problem         Past Surgical History:   Past Surgical History:   Procedure Laterality Date    BILATERAL SALPINGO-OOPHORECTOMY (BSO) Bilateral 12/9/2020    Procedure: SALPINGO-OOPHORECTOMY, BILATERAL;  Surgeon: Yun Recio MD;  Location: [facility];  Service: OB/GYN;  Laterality: Bilateral;  COVID NEGATIVE 12/3    INCISION OF UVULA      INSERTION OF TUNNELED CENTRAL VENOUS CATHETER (CVC) WITH SUBCUTANEOUS PORT Right 7/29/2024    Procedure: INSERTION, SINGLE LUMEN CATHETER WITH PORT, WITH FLUOROSCOPIC GUIDANCE;  Surgeon: Russel Layton MD;  Location: [address];  Service: General;  Laterality: Right;    knee arhroscopy      NASAL SEPTUM SURGERY      SINUS SURGERY      TONSILLECTOMY      TOTAL ABDOMINAL HYSTERECTOMY N/A 12/9/2020    Procedure: HYSTERECTOMY, TOTAL, ABDOMINAL;  Surgeon: Yun Recio MD;  Location: [facility];  Service:  "OB/GYN;  Laterality: N/A;    UTERINE FIBROID SURGERY          Family History:   Family History   Problem Relation Name Age of Onset    Asthma Mother      Hypertension Mother      COPD Father      Diabetes Father      Hypertension Father      Esophageal cancer Maternal Uncle Jossue     Colon polyps Maternal Grandfather          "every colonoscopy"    Bone cancer Paternal Grandmother      Kidney cancer Maternal Aunt Sherlyn Alvarez        kidney removal    Skin cancer Maternal Uncle Tim         possibly melanoma  recurrent    Throat cancer Maternal Uncle Medardo         late 20s - early 30s    Lung cancer Maternal Uncle Maksim         squamous cell  smoke exposure    Leukemia Maternal Uncle Kendall     Colon cancer Neg Hx          Social History:   Social History     Tobacco Use    Smoking status: Never    Smokeless tobacco: Never   Substance Use Topics    Alcohol use: Yes     Comment: Socially        I have reviewed and updated the patient's past medical, surgical, family and social histories.    Allergies:   Review of patient's allergies indicates:  No Known Allergies     Medications:   Current Outpatient Medications   Medication Sig Dispense Refill    cetirizine (ZYRTEC) 10 MG tablet Take 10 mg by mouth daily as needed.       cholecalciferol, vitamin D3, 5,000 unit Tab Take 5,000 Units by mouth once daily.      citalopram (CELEXA) 20 MG tablet Take 20 mg by mouth every morning.      EVAMIST 1.53 mg/spray (1.7%) transdermal spray SPRAY 2 SPRAYS TO FOREARM DAILY      fluticasone (FLONASE) 50 mcg/actuation nasal spray 1 spray (50 mcg total) by Each Nare route once daily. (Patient not taking: Reported on 9/10/2024) 16 g 0    hydroCHLOROthiazide (HYDRODIURIL) 25 MG tablet Take 25 mg by mouth every morning.      LIDOcaine-prilocaine (EMLA) cream Apply topically as needed. 30 g 3    LORazepam (ATIVAN) 0.5 MG tablet Take 1 tablet (0.5 mg total) by mouth every 12 (twelve) hours as needed for Anxiety. 5 tablet 0    niacin 50 MG Tab " niacin      niacin 50 MG Tab       omega 3-dha-epa-fish oil (FISH OIL) 1,000 mg (120 mg-180 mg) Cap Take by mouth once daily.      ondansetron (ZOFRAN-ODT) 8 MG TbDL Take 1 tablet (8 mg total) by mouth every 6 (six) hours as needed (chemotherapy induced nausea/vomiting). 60 tablet 4    oxyCODONE (ROXICODONE) 10 mg Tab immediate release tablet Take 1 tablet (10 mg total) by mouth every 4 (four) hours as needed. 180 tablet 0    rosuvastatin (CRESTOR) 10 MG tablet Take by mouth every evening.       No current facility-administered medications for this visit.      Physical Exam:   [facility] 11/18/2020          Physical Exam  Constitutional:       General: She is not in acute distress.     Appearance: Normal appearance. She is well-developed and normal weight. She is not ill-appearing, toxic-appearing or diaphoretic.      Comments: PE limited d/t nature of virtual visit   HENT:      Head: Normocephalic and atraumatic.      Right Ear: External ear normal.      Left Ear: External ear normal.   Eyes:      General: No scleral icterus.     Conjunctiva/sclera: Conjunctivae normal.   Pulmonary:      Effort: Pulmonary effort is normal. No respiratory distress.   Skin:     Capillary Refill: Capillary refill takes less than 2 seconds.   Neurological:      Mental Status: She is alert and oriented to person, place, and time.      Gait: Gait is intact.   Psychiatric:         Mood and Affect: Mood normal.         Behavior: Behavior normal.         Labs:   No results found for this or any previous visit (from the past 48 hours).    Labs reviewed.     Imaging:      MRI 10/31/2024:     Impression:     Motion degraded sequences.     *TUMOR     Compared to MRI 07/15/2024, post treatment primary tumor assessment: Probable Incomplete response (likely residual tumor)     Improving heterogeneous signal and wall thickening of the rectum with some residual restricted diffusion which suggests residual tumor.  Improvement of the size of multiple  mesorectal lymph nodes.     *NODES     Suspicious Mesorectal Lymph nodes: No     Suspicious Extramesorectal Lymph nodes: No    CT CAP - 10/21/24:    Impression:     In this patient with a known history of rectal malignancy, at the perirectal lymph nodes have significantly decreased in size, for example the 8 mm lymph node now measures 4 mm.  Additionally, the pulmonary nodules are also either resolved or significantly decreased in size.  For example the spiculated nodule in the lingula previously measured 1 cm and currently measures 0.5 cm.     Soft tissue density nodule in the anterior mediastinum measures 1.7 cm, stable.     Focal regions of sclerosis in the right roxann sacrum and T10 vertebra, stable and remaining indeterminate.     Cholelithiasis.     Large hiatal hernia.     Interval placement of a left ureteral stent    Path:   Distal rectal mass biopsy 6/21/24: well differentiated adenocarcinoma, negative for LVI and perineural invasion. IHC MSI-H low probability with intact nuclear expressions of MLH1, MSH2, MSH6, and PMS2.       Assessment:       1. Rectal cancer    2. Malignant neoplasm metastatic to left lung    3. 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 Microcytic anemia    6. Nephrolithiasis    7. Generalized anxiety disorder    8. Hypertension, unspecified type    9. Hyperlipidemia, unspecified hyperlipidemia type    10. Allergic rhinitis, unspecified seasonality, unspecified trigger    11. Irritable bowel syndrome with constipation    12. Cystitis       Plan:         # Rectal adenocarcinoma  Imelda Kimble is a 49 y.o. woman with distal rectal G1 adenocarcinoma pMMR, 5.6 cm long, below the anterior peritoneal reflection, MRF threatened, with involvement the internal sphincter, EMVI-, with multiple (8+) small suspicious mesorectal and superior rectal nodes.     She was in her usual health and went to PCP for annual screening in 2024. PCP recommended  screening colonoscopy as she is >45. 2 weeks prior to her screening colonoscopy, she noticed hematochezia and rectal pain. Her only symptom prior to this was constipation, which is lifelong. Found to have distal rectal tumor as above.    PET/CT 7/19/24 showed hypermetabolism of the primary rectal tumor with small mesorectal lymph nodes.  Additional 9mm L lung nodule and mediastinal lymph node were hypermetabolic.    We discussed her at tumor board with recommendations for induction chemotherapy and then potential local therapy (chemoradiation) to rectum and SBRT/surgery to lung.    Had port placed on 7/29/24    Received cycle 1 FOLFOX + Avastin on 8/12/24. Ms. Segura returns prior to cycle 6 of FOLFOX + bevacizumab, now on maintenance 5-FU plus avastin. Last dose of Oxaliplatin was given on 9/25/2024  Repeat imaging after cycle 4 shows good response in mesorectal lymph nodes and L lingular lung metastasis.  Had restaging MRI of pelvis rectum that displays very good response to treatment. Reviewed by Dr Rudolph and Dr. Menendez and will discuss moving towards consolidative chemoradiation to her rectum. Will present at colorectal tumor board after MRI.    Doing well. Received 8 cycles of chemo, 5-FU only with last cycle.   Completed radiation on 12/24/24. Starting to feel better.   I have reviewed the CBC and CMP, stable.   UA stable. Sent pyridium for cystitis as this has been helping her symptoms.   She would like to hold off on chemo this week and resume after visit in 2 weeks.     RTC in 2 weeks with chemo.   Pt would like to continue treatment in [place].   Repeat imaging in ~2 weeks.     # Microcytic anemia, nephrolithiasis  Suspect MICHELLE. Counts normalized today. Monitor.    Doing better. Ureteral stent was removed on 10/28/2024. No urinary symptoms at this time.     Other chronic disease managed by PCP   # Generalized anxiety disorder - continue citalopram   # HTN - HCTZ   # HLD - statin  # allergic rhinitis -  zyrtec and flonase, stable   # IBS-Constipation - Stable    Follow up: 2 weeks with labs, scans and chemo    Patient is in agreement with the proposed treatment plan. All questions were answered to the patient's satisfaction. Pt knows to call clinic if anything is needed before the next clinic visit.    Patient discussed with collaborating physician, Dr. Rudolph.    At least 40 minutes were spent today on this encounter including face to face time with the patient, data gathering/interpretation and documentation.       Eliane Pozo, MSN, APRN, [facility]  Hematology and Medical Oncology  Clinical Nurse Specialist to Dr. Rudolph, Dr. Vázquez & Dr. Holly    Route Chart for Scheduling    Med Onc Chart Routing      Follow up with physician 2 weeks and 6 weeks. with labs for chemo. please have chemo scheduled every 2 weeks in [place] starting after MD visit in 2 weeks.   Follow up with LEIDA 4 weeks. with labs for chemo   Infusion scheduling note   chemo every 2 weeks in [place], pump d/c on day 3   Injection scheduling note    Labs CBC, CMP, CEA and urinalysis   Scheduling:  Preferred lab:  Lab interval: every 2 weeks     Imaging    Pharmacy appointment    Other referrals              Treatment Plan Information   OP GI mFOLFOX6 (oxaliplatin leucovorin fluorouracil) with bevacizumab Q2W Dion Rudolph MD   Associated diagnosis: Rectal adenocarcinoma Stage NAVID cT2, cN2b, cM1a noted on 7/15/2024   Line of treatment: First Line  Treatment Goal: Palliative     Upcoming Treatment Dates - OP GI mFOLFOX6 (oxaliplatin leucovorin fluorouracil) with bevacizumab Q2W    12/17/2024       Chemotherapy       bevacizumab-awwb (MVASI) 5 mg/kg = 550 mg in 0.9% NaCl 100 mL infusion       fluorouracil (Adrucil) 2,400 mg/m2 = 5,470 mg in 0.9% NaCl 240 mL chemo infusion       Antiemetics       palonosetron (ALOXI) 0.25 mg with Dexamethasone (DECADRON) 12 mg in NS 50 mL IVPB  12/31/2024       Chemotherapy       bevacizumab-awwb (MVASI) 5 mg/kg  = 550 mg in 0.9% NaCl 100 mL infusion       fluorouracil (Adrucil) 2,400 mg/m2 = 5,470 mg in 0.9% NaCl 240 mL chemo infusion       Antiemetics       palonosetron (ALOXI) 0.25 mg with Dexamethasone (DECADRON) 12 mg in NS 50 mL IVPB  1/14/2025       Chemotherapy       bevacizumab-awwb (MVASI) 5 mg/kg = 550 mg in 0.9% NaCl 100 mL infusion       fluorouracil (Adrucil) 2,400 mg/m2 = 5,470 mg in 0.9% NaCl 240 mL chemo infusion       Antiemetics       palonosetron (ALOXI) 0.25 mg with Dexamethasone (DECADRON) 12 mg in NS 50 mL IVPB  1/28/2025       Chemotherapy       bevacizumab-awwb (MVASI) 5 mg/kg = 550 mg in 0.9% NaCl 100 mL infusion       fluorouracil (Adrucil) 2,400 mg/m2 = 5,470 mg in 0.9% NaCl 240 mL chemo infusion       Antiemetics       palonosetron (ALOXI) 0.25 mg with Dexamethasone (DECADRON) 12 mg in NS 50 mL IVPB

## 2025-01-16 ENCOUNTER — TELEPHONE (OUTPATIENT)
Dept: HEMATOLOGY/ONCOLOGY | Facility: CLINIC | Age: 50
End: 2025-01-16
Payer: COMMERCIAL

## 2025-01-16 NOTE — TELEPHONE ENCOUNTER
Dr. JARROD Duran asking for notes showing staging.  Faxed most recent clinic notes    ----- Message from Tanner sent at 1/16/2025  9:43 AM CST -----  Regarding: Progress Notes  Pt Nurse Edwon calling in regards to getting pt progress notes faxed over .    Contact number [phone] Fax number [phone]

## 2025-01-24 ENCOUNTER — HOSPITAL ENCOUNTER (OUTPATIENT)
Dept: RADIOLOGY | Facility: HOSPITAL | Age: 50
Discharge: HOME | End: 2025-01-24
Attending: REGISTERED NURSE
Payer: COMMERCIAL

## 2025-01-24 DIAGNOSIS — C78.02 MALIGNANT NEOPLASM METASTATIC TO LEFT LUNG: ICD-10-CM

## 2025-01-24 DIAGNOSIS — C20 RECTAL CANCER: ICD-10-CM

## 2025-01-24 PROCEDURE — 71260 CT THORAX DX C+: CPT | Mod: TC

## 2025-01-24 PROCEDURE — 25500020 PHARM REV CODE 255: Performed by: REGISTERED NURSE

## 2025-01-24 PROCEDURE — 71260 CT THORAX DX C+: CPT | Mod: 26,,, | Performed by: RADIOLOGY

## 2025-01-24 PROCEDURE — 74177 CT ABD & PELVIS W/CONTRAST: CPT | Mod: 26,,, | Performed by: RADIOLOGY

## 2025-01-24 RX ADMIN — IOHEXOL 100 ML: 350 INJECTION, SOLUTION INTRAVENOUS at 01:01

## 2025-01-24 RX ADMIN — IOHEXOL 30 ML: 350 INJECTION, SOLUTION INTRAVENOUS at 12:01

## 2025-01-27 ENCOUNTER — OFFICE VISIT (OUTPATIENT)
Dept: HEMATOLOGY/ONCOLOGY | Facility: CLINIC | Age: 50
End: 2025-01-27
Payer: COMMERCIAL

## 2025-01-27 VITALS
WEIGHT: 225.06 LBS | HEART RATE: 101 BPM | DIASTOLIC BLOOD PRESSURE: 97 MMHG | TEMPERATURE: 98 F | HEIGHT: 67 IN | SYSTOLIC BLOOD PRESSURE: 140 MMHG | OXYGEN SATURATION: 95 % | BODY MASS INDEX: 35.33 KG/M2

## 2025-01-27 DIAGNOSIS — D84.821 IMMUNODEFICIENCY SECONDARY TO CHEMOTHERAPY: ICD-10-CM

## 2025-01-27 DIAGNOSIS — T45.1X5A IMMUNODEFICIENCY SECONDARY TO CHEMOTHERAPY: ICD-10-CM

## 2025-01-27 DIAGNOSIS — Z79.899 IMMUNODEFICIENCY SECONDARY TO CHEMOTHERAPY: ICD-10-CM

## 2025-01-27 DIAGNOSIS — N20.0 NEPHROLITHIASIS: ICD-10-CM

## 2025-01-27 DIAGNOSIS — D84.81 IMMUNODEFICIENCY SECONDARY TO NEOPLASM: ICD-10-CM

## 2025-01-27 DIAGNOSIS — C78.02 MALIGNANT NEOPLASM METASTATIC TO LEFT LUNG: ICD-10-CM

## 2025-01-27 DIAGNOSIS — C20 RECTAL CANCER: Primary | ICD-10-CM

## 2025-01-27 DIAGNOSIS — F41.1 GENERALIZED ANXIETY DISORDER: ICD-10-CM

## 2025-01-27 DIAGNOSIS — D50.9 MICROCYTIC ANEMIA: ICD-10-CM

## 2025-01-27 DIAGNOSIS — I10 HYPERTENSION, UNSPECIFIED TYPE: ICD-10-CM

## 2025-01-27 DIAGNOSIS — E78.5 HYPERLIPIDEMIA, UNSPECIFIED HYPERLIPIDEMIA TYPE: ICD-10-CM

## 2025-01-27 DIAGNOSIS — D49.9 IMMUNODEFICIENCY SECONDARY TO NEOPLASM: ICD-10-CM

## 2025-01-27 DIAGNOSIS — K58.1 IRRITABLE BOWEL SYNDROME WITH CONSTIPATION: ICD-10-CM

## 2025-01-27 DIAGNOSIS — J30.9 ALLERGIC RHINITIS, UNSPECIFIED SEASONALITY, UNSPECIFIED TRIGGER: ICD-10-CM

## 2025-01-27 PROCEDURE — 3077F SYST BP >= 140 MM HG: CPT | Mod: CPTII,S$GLB,, | Performed by: INTERNAL MEDICINE

## 2025-01-27 PROCEDURE — 99215 OFFICE O/P EST HI 40 MIN: CPT | Mod: S$GLB,,, | Performed by: INTERNAL MEDICINE

## 2025-01-27 PROCEDURE — G2211 COMPLEX E/M VISIT ADD ON: HCPCS | Mod: S$GLB,,, | Performed by: INTERNAL MEDICINE

## 2025-01-27 PROCEDURE — 3008F BODY MASS INDEX DOCD: CPT | Mod: CPTII,S$GLB,, | Performed by: INTERNAL MEDICINE

## 2025-01-27 PROCEDURE — 99999 PR PBB SHADOW E&M-EST. PATIENT-LVL III: CPT | Mod: PBBFAC,,, | Performed by: INTERNAL MEDICINE

## 2025-01-27 PROCEDURE — 3080F DIAST BP >= 90 MM HG: CPT | Mod: CPTII,S$GLB,, | Performed by: INTERNAL MEDICINE

## 2025-01-27 RX ORDER — PROCHLORPERAZINE EDISYLATE 5 MG/ML
10 INJECTION INTRAMUSCULAR; INTRAVENOUS ONCE AS NEEDED
Status: CANCELLED | OUTPATIENT
Start: 2025-01-28

## 2025-01-27 RX ORDER — SODIUM CHLORIDE 0.9 % (FLUSH) 0.9 %
10 SYRINGE (ML) INJECTION
Status: CANCELLED | OUTPATIENT
Start: 2025-01-30

## 2025-01-27 RX ORDER — PROCHLORPERAZINE EDISYLATE 5 MG/ML
10 INJECTION INTRAMUSCULAR; INTRAVENOUS ONCE AS NEEDED
Status: CANCELLED | OUTPATIENT
Start: 2025-01-30

## 2025-01-27 RX ORDER — HEPARIN 100 UNIT/ML
500 SYRINGE INTRAVENOUS
Status: CANCELLED | OUTPATIENT
Start: 2025-01-30

## 2025-01-27 RX ORDER — SODIUM CHLORIDE 0.9 % (FLUSH) 0.9 %
10 SYRINGE (ML) INJECTION
Status: CANCELLED | OUTPATIENT
Start: 2025-01-28

## 2025-01-27 RX ORDER — EPINEPHRINE 0.3 MG/.3ML
0.3 INJECTION SUBCUTANEOUS ONCE AS NEEDED
Status: CANCELLED | OUTPATIENT
Start: 2025-01-28

## 2025-01-27 RX ORDER — HEPARIN 100 UNIT/ML
500 SYRINGE INTRAVENOUS
Status: CANCELLED | OUTPATIENT
Start: 2025-01-28

## 2025-01-27 RX ORDER — DIPHENHYDRAMINE HYDROCHLORIDE 50 MG/ML
50 INJECTION INTRAMUSCULAR; INTRAVENOUS ONCE AS NEEDED
Status: CANCELLED | OUTPATIENT
Start: 2025-01-28

## 2025-01-27 NOTE — PROGRESS NOTES
MEDICAL ONCOLOGY - ESTABLISHED PATIENT VISIT    Reason for visit: rectal cancer     Best Contact Phone Number(s): [phone] (home)      Cancer/Stage/TNM:    Cancer Staging   Rectal adenocarcinoma  Staging form: Colon and Rectum, AJCC 8th Edition  - Clinical stage from 6/21/2024: Stage NAVID (cT2, cN2b, cM1a) - Signed by Dion Rudolph MD on 7/24/2024  - Pathologic: No stage assigned - Unsigned       Oncology History   Rectal adenocarcinoma   6/21/2024 Procedure    Colonoscopy - mild diverticular disease, mass in distal rectum (Complete)     6/21/2024 Cancer Staged    Staging form: Colon and Rectum, AJCC 8th Edition  - Clinical stage from 6/21/2024: Stage NAVID (cT2, cN2b, cM1a)     6/24/2024 Initial Diagnosis    Rectal adenocarcinoma  ERROL  - Colonoscopy 6/21/24: infiltrative, ulcerated, sub-mucosal, fungating and villous bleeding mass found in distal rectum, 6-7cm from the anus proximally.   - Distal rectal mass biopsy 6/21/24: well differentiated adenocarcinoma, negative for LVI and perineural invasion. IHC MSI-H low probability with intact nuclear expressions of MLH1, MSH2, MSH6, and PMS2.        7/15/2024 Imaging Significant Findings    MRI Rectal cancer  Low rectal tumor invading the internal sphincter in close approximation to the mesorectal fascia without definite involvement.  Multiple suspicious mesorectal and superior rectal nodes.     *MR STAGE     T: T1/T2 (tumor confined to rectal wall)     N: N+ (short axis ? 9 mm)     Blayne statement: Multiple suspicious mesorectal and superior rectal nodes.     *MRF: threatened (tumor margin within 1-2 mm of MRF) noting this is a low rectal tumor.  No definite evidence of invasion.     Sphincter Involvement: Yes involvement the internal sphincter     Suspicious Extramesorectal Lymph Nodes: No     EMVI: No     7/16/2024 Tumor Markers    Patient's tumor was tested for the following markers: CEA                                              Results of the tumor  marker test revealed 2.9     7/17/2024 Imaging Significant Findings    CT CAP  In this patient with a known history of rectal malignancy, there are few borderline prominent lymph nodes adjacent to the rectum measuring up to 8 mm.  There are solid nodules in both lungs, the largest in the lingula measuring up to 1 cm.  Given the patient's primary malignancy, pulmonary metastatic disease cannot be excluded.  Follow-up based upon oncologic guidance.     Prominent soft tissue density in the anterior superior mediastinum which could possibly represent residual thymic tissue with a focal solid structure measuring up to 1.0 x 1.7 cm, possibly an enlarged lymph node versus other soft tissue mass.     Indeterminate focal regions of sclerosis within the T10 vertebra and right sacrum.  Osseous metastatic disease cannot be excluded.     Cholelithiasis without CT evidence for cholecystitis.     Large hiatal hernia.  Diverticulosis coli without diverticulitis.     Large left UPJ stone measuring up to 1.2 cm without hydronephrosis or hydroureter.     7/19/2024 Imaging Significant Findings    PET CT  Hypermetabolic soft tissue thickening along the rectal wall compatible with known malignancy.  There are a few prominent perirectal mesorectal lymph nodes without significant hypermetabolic activity, please note some may be too small for characterization by PET.     0.9 cm lingular pulmonary nodule with mild hypermetabolic activity and prominent hypermetabolic mediastinal lymph node, concerning for metastatic foci.     8/12/2024 -  Chemotherapy    Treatment Summary   Plan Name: OP GI mFOLFOX6 (oxaliplatin leucovorin fluorouracil) with bevacizumab Q2W  Treatment Goal: Palliative  Status: Active  Start Date: 8/12/2024  End Date: 8/28/2025 (Planned)  Provider: Dion Rudolph MD  Chemotherapy: oxaliplatin (ELOXATIN) 85 mg/m2 = 194 mg in D5W 603.8 mL chemo infusion, 85 mg/m2 = 194 mg, Intravenous, Clinic/HOD 1 time, 4 of 4  cycles  Administration: 194 mg (8/12/2024), 194 mg (8/28/2024), 194 mg (9/12/2024), 194 mg (9/25/2024)  fluorouracil (Adrucil) 2,400 mg/m2 = 5,470 mg in 0.9% NaCl 240 mL chemo infusion, 2,400 mg/m2 = 5,470 mg, Intravenous, Clinic/HOD 1 time, 8 of 24 cycles  Administration: 5,470 mg (8/12/2024), 5,470 mg (8/28/2024), 5,470 mg (9/12/2024), 5,470 mg (9/25/2024), 5,470 mg (10/23/2024), 5,470 mg (11/6/2024), 5,470 mg (11/20/2024), 5,470 mg (12/4/2024)  bevacizumab-awwb (MVASI) 500 mg in 0.9% NaCl 100 mL infusion, 550 mg, Intravenous, Clinic/HOD 1 time, 7 of 23 cycles  Administration: 500 mg (8/12/2024), 500 mg (8/28/2024), 500 mg (9/12/2024), 500 mg (9/25/2024), 500 mg (10/23/2024), 500 mg (11/6/2024), 500 mg (11/20/2024)     12/18/2024 - 12/24/2024 Radiation Therapy    Treating physician: Kevin Lopez  Course: C1 Pelvis 2024  Treatment Site Ref. ID Energy Dose/Fx (Gy) #Fx Dose Correction (Gy) Total Dose (Gy) Start Date End Date Elapsed Days   IM Rectum PTV_High 6X 5 5 / 5 0 25 12/18/2024 12/24/2024 6           Interval History:   Ms. Segura returns for follow up prior to restarting maintenance 5-FU + chreelle after completing radiation on 12/24/24. She is feeling well overall. Still with some bladder irritation, taking pyridium with some relief.  She is otherwise not having any new pain or bleeding.    Patient presents to visit alone. ECOG PS is 0.    ROS:   Review of Systems   Constitutional:  Positive for malaise/fatigue. Negative for chills, fever and weight loss.   HENT:  Negative for hearing loss.    Eyes:  Negative for blurred vision.   Respiratory:  Negative for cough, hemoptysis and shortness of breath.    Cardiovascular:  Negative for chest pain.   Gastrointestinal:  Positive for constipation. Negative for abdominal pain, blood in stool, nausea and vomiting.   Genitourinary:  Negative for dysuria, frequency and urgency.   Musculoskeletal:  Negative for back pain and myalgias.   Skin:  Negative for rash.  "  Neurological:  Negative for dizziness, tingling, weakness and headaches.   Endo/Heme/Allergies:  Does not bruise/bleed easily.   Psychiatric/Behavioral:  Negative for depression. The patient is not nervous/anxious.        Past Medical History:   Past Medical History:   Diagnosis Date    Anxiety disorder, unspecified     Fibroids     HTN (hypertension)     Sinus problem         Past Surgical History:   Past Surgical History:   Procedure Laterality Date    BILATERAL SALPINGO-OOPHORECTOMY (BSO) Bilateral 12/9/2020    Procedure: SALPINGO-OOPHORECTOMY, BILATERAL;  Surgeon: Yun Recio MD;  Location: [facility] OR;  Service: OB/GYN;  Laterality: Bilateral;  COVID NEGATIVE 12/3    INCISION OF UVULA      INSERTION OF TUNNELED CENTRAL VENOUS CATHETER (CVC) WITH SUBCUTANEOUS PORT Right 7/29/2024    Procedure: INSERTION, SINGLE LUMEN CATHETER WITH PORT, WITH FLUOROSCOPIC GUIDANCE;  Surgeon: Russel Layton MD;  Location: [address];  Service: General;  Laterality: Right;    knee arhroscopy      NASAL SEPTUM SURGERY      SINUS SURGERY      TONSILLECTOMY      TOTAL ABDOMINAL HYSTERECTOMY N/A 12/9/2020    Procedure: HYSTERECTOMY, TOTAL, ABDOMINAL;  Surgeon: Yun Recio MD;  Location: [facility] OR;  Service: OB/GYN;  Laterality: N/A;    UTERINE FIBROID SURGERY          Family History:   Family History   Problem Relation Name Age of Onset    Asthma Mother      Hypertension Mother      COPD Father      Diabetes Father      Hypertension Father      Esophageal cancer Maternal Uncle Jossue     Colon polyps Maternal Grandfather          "every colonoscopy"    Bone cancer Paternal Grandmother      Kidney cancer Maternal Aunt Sherlyn 59        kidney removal    Skin cancer Maternal Uncle Tim         possibly melanoma  recurrent    Throat cancer Maternal Uncle Medardo         late 20s - early 30s    Lung cancer Maternal Uncle Maksim         squamous cell  smoke exposure    Leukemia Maternal Uncle Kendall     Colon cancer Neg Hx "          Social History:   Social History     Tobacco Use    Smoking status: Never    Smokeless tobacco: Never   Substance Use Topics    Alcohol use: Yes     Comment: Socially        I have reviewed and updated the patient's past medical, surgical, family and social histories.    Allergies:   Review of patient's allergies indicates:  No Known Allergies     Medications:   Current Outpatient Medications   Medication Sig Dispense Refill    cetirizine (ZYRTEC) 10 MG tablet Take 10 mg by mouth daily as needed.       cholecalciferol, vitamin D3, 5,000 unit Tab Take 5,000 Units by mouth once daily.      citalopram (CELEXA) 20 MG tablet Take 20 mg by mouth every morning.      EVAMIST 1.53 mg/spray (1.7%) transdermal spray SPRAY 2 SPRAYS TO FOREARM DAILY      hydroCHLOROthiazide (HYDRODIURIL) 25 MG tablet Take 25 mg by mouth every morning.      LIDOcaine-prilocaine (EMLA) cream Apply topically as needed. 30 g 3    LORazepam (ATIVAN) 0.5 MG tablet Take 1 tablet (0.5 mg total) by mouth every 12 (twelve) hours as needed for Anxiety. 5 tablet 0    niacin 50 MG Tab niacin      niacin 50 MG Tab       ondansetron (ZOFRAN-ODT) 8 MG TbDL Take 1 tablet (8 mg total) by mouth every 6 (six) hours as needed (chemotherapy induced nausea/vomiting). 60 tablet 4    oxyCODONE (ROXICODONE) 10 mg Tab immediate release tablet Take 1 tablet (10 mg total) by mouth every 4 (four) hours as needed. 180 tablet 0    phenazopyridine (PYRIDIUM) 200 MG tablet Take 1 tablet (200 mg total) by mouth 3 (three) times daily with meals. 30 tablet 2    rosuvastatin (CRESTOR) 10 MG tablet Take by mouth every evening.      fluticasone (FLONASE) 50 mcg/actuation nasal spray 1 spray (50 mcg total) by Each Nare route once daily. (Patient not taking: Reported on 1/27/2025) 16 g 0    omega 3-dha-epa-fish oil (FISH OIL) 1,000 mg (120 mg-180 mg) Cap Take by mouth once daily.       No current facility-administered medications for this visit.      Physical Exam:   BP (!)  "140/97 (BP Location: Left arm, Patient Position: Sitting)   Pulse 101   Temp 97.9 °F (36.6 °C) (Temporal)   Ht 5' 7" (1.702 m)   Wt 102.1 kg (225 lb 1.4 oz)   LMP 11/18/2020   SpO2 95%   BMI 35.25 kg/m²          Physical Exam  Constitutional:       General: She is not in acute distress.     Appearance: Normal appearance. She is well-developed and normal weight. She is not ill-appearing, toxic-appearing or diaphoretic.   HENT:      Head: Normocephalic and atraumatic.      Right Ear: External ear normal.      Left Ear: External ear normal.   Eyes:      General: No scleral icterus.     Conjunctiva/sclera: Conjunctivae normal.   Pulmonary:      Effort: Pulmonary effort is normal. No respiratory distress.   Skin:     Capillary Refill: Capillary refill takes less than 2 seconds.   Neurological:      Mental Status: She is alert and oriented to person, place, and time.      Gait: Gait is intact.   Psychiatric:         Mood and Affect: Mood normal.         Behavior: Behavior normal.         Labs:   No results found for this or any previous visit (from the past 48 hours).    Labs reviewed. Normal CBC, CMP.      Imaging:      CT CAP - 1/24/25:    Impression:     No detrimental change.  Unchanged pulmonary nodules and anterior mediastinal nodule.  Continued attention on follow-up recommended     Cholelithiasis.    Path:   Distal rectal mass biopsy 6/21/24: well differentiated adenocarcinoma, negative for LVI and perineural invasion. IHC MSI-H low probability with intact nuclear expressions of MLH1, MSH2, MSH6, and PMS2.       Assessment:       1. Rectal cancer    2. Malignant neoplasm metastatic to left lung    3. 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 Microcytic anemia    6. Nephrolithiasis    7. Generalized anxiety disorder    8. Hypertension, unspecified type    9. Hyperlipidemia, unspecified hyperlipidemia type    10. Allergic rhinitis, unspecified seasonality, " unspecified trigger    11. Irritable bowel syndrome with constipation         Plan:         # Rectal adenocarcinoma  Imelda Kimble is a 49 y.o. woman with distal rectal G1 adenocarcinoma pMMR, 5.6 cm long, below the anterior peritoneal reflection, MRF threatened, with involvement the internal sphincter, EMVI-, with multiple (8+) small suspicious mesorectal and superior rectal nodes.     She was in her usual health and went to PCP for annual screening in 2024. PCP recommended screening colonoscopy as she is >45. 2 weeks prior to her screening colonoscopy, she noticed hematochezia and rectal pain. Her only symptom prior to this was constipation, which is lifelong. Found to have distal rectal tumor as above.    PET/CT 7/19/24 showed hypermetabolism of the primary rectal tumor with small mesorectal lymph nodes.  Additional 9mm L lung nodule and mediastinal lymph node were hypermetabolic.    We discussed her at tumor board with recommendations for induction chemotherapy and then potential local therapy (chemoradiation) to rectum and SBRT/surgery to lung.    Had port placed on 7/29/24    Received cycle 1 FOLFOX + Avastin on 8/12/24, then moved to maintenance 5-FU plus avastin. Last dose of Oxaliplatin was given on 9/25/2024  Repeat imaging after cycle 4 shows good response in mesorectal lymph nodes and L lingular lung metastasis.  Had restaging MRI of pelvis rectum that displays very good response to treatment.   Received 8 cycles of chemo, 5-FU only with last cycle.     Completed short course radiation on 12/24/24.   CT CAP 1/24/25 shows stable disease.    Discussed options of taking treatment break given low volume burden vs restarting maintenance 5-FU + cherelle vs chemo + consideration of consolidative local therapies to lung met and anterior mediastinal lymph node.    I have reviewed the CBC and CMP, stable.   Proceed with cycle 9 - maintenance 5-FU + bevacizumab.  Will discuss further care options with Dr. Menendez +   John.    RTC in 2 weeks with chemo.   Pt would like to continue treatment in [place].     # Microcytic anemia, nephrolithiasis  Suspect MICHELLE. Counts normalized today. Monitor.    Doing better. Ureteral stent was removed on 10/28/2024. No urinary symptoms at this time.     Other chronic disease managed by PCP   # Generalized anxiety disorder - continue citalopram   # HTN - HCTZ   # HLD - statin  # allergic rhinitis - zyrtec and flonase, stable   # IBS-Constipation - Stable    Follow up: 2 weeks with labs, scans and chemo    Patient is in agreement with the proposed treatment plan. All questions were answered to the patient's satisfaction. Pt knows to call clinic if anything is needed before the next clinic visit.    Dion Rudolph MD  Hematology/Oncology  [facility]      Route Chart for Scheduling    Med Onc Chart Routing      Follow up with physician 4 weeks. for 5-FU + Avastin   Follow up with LEIDA 2 weeks. for 5-FU + Avastin   Infusion scheduling note   infusions at [facility]   Injection scheduling note    Labs CBC, CMP, CEA and urinalysis   Scheduling:  Preferred lab:  Lab interval: every 2 weeks     Imaging    Pharmacy appointment    Other referrals              Treatment Plan Information   OP GI mFOLFOX6 (oxaliplatin leucovorin fluorouracil) with bevacizumab Q2W Dion Rudolph MD   Associated diagnosis: Rectal adenocarcinoma Stage NAVID cT2, cN2b, cM1a noted on 7/15/2024   Line of treatment: First Line  Treatment Goal: Palliative     Upcoming Treatment Dates - OP GI mFOLFOX6 (oxaliplatin leucovorin fluorouracil) with bevacizumab Q2W    1/28/2025       Chemotherapy       bevacizumab-awwb (MVASI) 5 mg/kg = 550 mg in 0.9% NaCl 100 mL infusion       fluorouracil (Adrucil) 2,400 mg/m2 = 5,470 mg in 0.9% NaCl 240 mL chemo infusion       Antiemetics       palonosetron (ALOXI) 0.25 mg with Dexamethasone (DECADRON) 12 mg in NS 50 mL IVPB  2/11/2025       Chemotherapy       bevacizumab-awwb  (MVASI) 5 mg/kg = 550 mg in 0.9% NaCl 100 mL infusion       fluorouracil (Adrucil) 2,400 mg/m2 = 5,470 mg in 0.9% NaCl 240 mL chemo infusion       Antiemetics       palonosetron (ALOXI) 0.25 mg with Dexamethasone (DECADRON) 12 mg in NS 50 mL IVPB  2/25/2025       Chemotherapy       bevacizumab-awwb (MVASI) 5 mg/kg = 550 mg in 0.9% NaCl 100 mL infusion       fluorouracil (Adrucil) 2,400 mg/m2 = 5,470 mg in 0.9% NaCl 240 mL chemo infusion       Antiemetics       palonosetron (ALOXI) 0.25 mg with Dexamethasone (DECADRON) 12 mg in NS 50 mL IVPB  3/11/2025       Chemotherapy       bevacizumab-awwb (MVASI) 5 mg/kg = 550 mg in 0.9% NaCl 100 mL infusion       fluorouracil (Adrucil) 2,400 mg/m2 = 5,470 mg in 0.9% NaCl 240 mL chemo infusion       Antiemetics       palonosetron (ALOXI) 0.25 mg with Dexamethasone (DECADRON) 12 mg in NS 50 mL IVPB

## 2025-02-05 ENCOUNTER — OFFICE VISIT (OUTPATIENT)
Dept: RADIATION ONCOLOGY | Facility: CLINIC | Age: 50
End: 2025-02-05
Payer: COMMERCIAL

## 2025-02-05 ENCOUNTER — TELEPHONE (OUTPATIENT)
Dept: HEMATOLOGY/ONCOLOGY | Facility: CLINIC | Age: 50
End: 2025-02-05
Payer: COMMERCIAL

## 2025-02-05 VITALS
SYSTOLIC BLOOD PRESSURE: 126 MMHG | WEIGHT: 214.75 LBS | HEART RATE: 91 BPM | DIASTOLIC BLOOD PRESSURE: 89 MMHG | HEIGHT: 67 IN | BODY MASS INDEX: 33.71 KG/M2

## 2025-02-05 DIAGNOSIS — C20 RECTAL CANCER: Primary | ICD-10-CM

## 2025-02-05 PROCEDURE — 99024 POSTOP FOLLOW-UP VISIT: CPT | Mod: S$GLB,,, | Performed by: STUDENT IN AN ORGANIZED HEALTH CARE EDUCATION/TRAINING PROGRAM

## 2025-02-05 PROCEDURE — 3074F SYST BP LT 130 MM HG: CPT | Mod: CPTII,S$GLB,, | Performed by: STUDENT IN AN ORGANIZED HEALTH CARE EDUCATION/TRAINING PROGRAM

## 2025-02-05 PROCEDURE — 3079F DIAST BP 80-89 MM HG: CPT | Mod: CPTII,S$GLB,, | Performed by: STUDENT IN AN ORGANIZED HEALTH CARE EDUCATION/TRAINING PROGRAM

## 2025-02-05 PROCEDURE — 1160F RVW MEDS BY RX/DR IN RCRD: CPT | Mod: CPTII,S$GLB,, | Performed by: STUDENT IN AN ORGANIZED HEALTH CARE EDUCATION/TRAINING PROGRAM

## 2025-02-05 PROCEDURE — 3008F BODY MASS INDEX DOCD: CPT | Mod: CPTII,S$GLB,, | Performed by: STUDENT IN AN ORGANIZED HEALTH CARE EDUCATION/TRAINING PROGRAM

## 2025-02-05 PROCEDURE — 1159F MED LIST DOCD IN RCRD: CPT | Mod: CPTII,S$GLB,, | Performed by: STUDENT IN AN ORGANIZED HEALTH CARE EDUCATION/TRAINING PROGRAM

## 2025-02-05 PROCEDURE — 99999 PR PBB SHADOW E&M-EST. PATIENT-LVL IV: CPT | Mod: PBBFAC,,, | Performed by: STUDENT IN AN ORGANIZED HEALTH CARE EDUCATION/TRAINING PROGRAM

## 2025-02-05 NOTE — PROGRESS NOTES
[facility] / [facility] - Radiation Oncology follow-up Note           Date of Service: 02/05/2025     Chief Complaint: rectal cancer, status post induction chemo and short course radiation     Reason for visit:  Post radiation toxicity check      Referring Physician: Dr Menendez ([facility])     Implantable devices: denies     Therapy to Date:  Course: C1 Pelvis 2024  Treatment Site Ref. ID Energy Dose/Fx (Gy) #Fx Dose Correction (Gy) Total Dose (Gy) Start Date End Date Elapsed Days   IM Rectum PTV_High 6X 5 5 / 5 0 25 12/18/2024 12/24/2024 6        Diagnosis/Assessment:   Imelda Kimble is a 49 y.o. woman with Stage NAVID (cT2, cN2b, cM1a) distal G1 adenocarcinoma pMMR, 5.6 cm long, below the anterior peritoneal reflection, MRF threatened, with involvement the internal sphincter, EMVI-, with multiple (8+) small suspicious mesorectal and superior rectal nodes. PET scan showed evidence of metastatic pulmonary disease  PMH hysterectomy/BSO, anxiety, HTN, Left knee replacement   She received mFOLFOX with bevacizumab Q2W (last dose of bevacizumab 11/20/2024) from 08/12/2024 - 12/04/2024 with Dr Rudolph   ECOG 0  Dealing from chemo related fatigue, GERD and nausea.  Discussed at Colorectal surgery tumor board plan for short course radiation  She is s/p short course radiation therapy 25 Gy in 5 daily fractions with vaginal dilator use, given Monday through Friday using IMRT to decrease small bowel and bladder dose completed 12/24/2024.    Her later CT of the lung shows 2 persistent lung nodules unchanged in size compared to prior    ECOG 0  With mild dysuria being followed by [facility]        Plan     - I will discuss the patient with Dr. Rudolph:  Between continuing chemotherapy versus consolidative radiation (SBRT) to the lung lesions.  - upon my review of the L CT chest, I believe we could treat left upper lobe lesion, but recommend monitoring the right middle lobe lesion due to the small size  - meanwhile we will  schedule follow-up in 3-1/2 month after the next set of scans     Interval history:      07/16/2024 initial radiation oncology evaluation - after finding out patient had stage IV disease to lungs  plan was changed to induction chemotherapy        07/19/2024 PET CT scan  0.9 pulmonary nodule with mild hypermetabolic activity in prominent hypermetabolic mediastinal lymph node concerning for metastasis          08/12/2024 - 12/04/2024 Dr Rudolph gave  mFOLFOX with bevacizumab Q2W (last dose of bevacizumab 11/20/2024).     10/21/2024 CT CAP              Perirectal lymph nodes significantly decreased in size     10/31/2024 MRI rectum - motion degraded              Treatment response from 5.6 cm long down to 2.5 cm long primary tumor, no visible regional lymph nodes        11/13/2024 Colorectal surgery tumor board              Plan for a short course radiation therapy  12/02/2024 CEA 2.5   12/24/2024 Tolerated radiation therapy well with that expected acute toxicities  Return to radiation oncology clinic in 4-6 weeks    1/27/2025 Medical Oncology Dr. Rudolph   c/w mFOLFOX6 (oxaliplatin leucovorin fluorouracil) with bevacizumab Q2W   Discussed options of taking treatment break given low volume burden vs restarting maintenance 5-FU + cherelle vs chemo + consideration of consolidative local therapies to lung met and anterior mediastinal lymph node.       01/24/2025 CT chest abdomen and pelvis   Solid 5 mm left upper lobe nodule unchanged in size compared to prior   Solid 3 mm right middle lobe nodule unchanged in size compared to prior           Subjective:   In clinic today the patient is alone.    She reports feeling okay overall.  She does report some sinus congestion.    She denies major bowel issues such as diarrhea constipation tenesmus or rectal bleeding.  She does not take prophylactic stool softener    She denies any vaginal issues such as discharge or bleeding.    She denies major urinary issues such as hematuria  urgency or frequency but she reports some dysuria.  She is still taking azo  which taints her urine orange.    Otherwise she denies other major issues such as abdominal pain chest pain nausea or vomiting.       Social history  From [place], single,  at [employer] at  [facility] [employer] in [place], [place].  (went to school at [employer] in )     Review of patient's allergies indicates:  No Known Allergies    Current Outpatient Medications on File Prior to Visit   Medication Sig Dispense Refill    cetirizine (ZYRTEC) 10 MG tablet Take 10 mg by mouth daily as needed.       cholecalciferol, vitamin D3, 5,000 unit Tab Take 5,000 Units by mouth once daily.      citalopram (CELEXA) 20 MG tablet Take 20 mg by mouth every morning.      EVAMIST 1.53 mg/spray (1.7%) transdermal spray SPRAY 2 SPRAYS TO FOREARM DAILY      fluticasone (FLONASE) 50 mcg/actuation nasal spray 1 spray (50 mcg total) by Each Nare route once daily. (Patient not taking: Reported on 1/27/2025) 16 g 0    hydroCHLOROthiazide (HYDRODIURIL) 25 MG tablet Take 25 mg by mouth every morning.      LIDOcaine-prilocaine (EMLA) cream Apply topically as needed. 30 g 3    LORazepam (ATIVAN) 0.5 MG tablet Take 1 tablet (0.5 mg total) by mouth every 12 (twelve) hours as needed for Anxiety. 5 tablet 0    niacin 50 MG Tab niacin      niacin 50 MG Tab       omega 3-dha-epa-fish oil (FISH OIL) 1,000 mg (120 mg-180 mg) Cap Take by mouth once daily.      ondansetron (ZOFRAN-ODT) 8 MG TbDL Take 1 tablet (8 mg total) by mouth every 6 (six) hours as needed (chemotherapy induced nausea/vomiting). 60 tablet 4    oxyCODONE (ROXICODONE) 10 mg Tab immediate release tablet Take 1 tablet (10 mg total) by mouth every 4 (four) hours as needed. 180 tablet 0    phenazopyridine (PYRIDIUM) 200 MG tablet Take 1 tablet (200 mg total) by mouth 3 (three) times daily with meals. 30 tablet 2    rosuvastatin (CRESTOR)  10 MG tablet Take by mouth every evening.       No current facility-administered medications on file prior to visit.          Review of Systems   Negative unless as above         HPI:   Imelda Kimble is a 48 y.o. woman with recent diagnosis of rectal cancer after presenting with screening colonoscopy     Oncologic history:  06/21/24 colonoscopy (Dr Rasheed)              Mild diverticulosis              Infiltrative ulcerated submucosal fungating and villous bleeding mass in distal rectum, spanning from anal verge to 6-7cm proximally              Pathology: G1 adenocarcinoma pMMR, without PNI or LVI     7/15/2024 MRI rectum  5.6 cm long low rectal lesion, below the anterior peritoneal reflection, MRF threatened, with involvement the internal sphincter, EMVI-, T1/T2, with multiple (8+) small suspicious mesorectal and superior rectal nodes.                    Objective:   Physical Exam  Vitals reviewed.     Constitutional:       Appearance: Normal appearance.   HENT:      Head: Normocephalic and atraumatic.   Eyes:      Conjunctiva/sclera: Conjunctivae normal.   Cardiovascular:      Comments: extremities well perfused  Pulmonary:      Effort: Pulmonary effort is normal.   Abdominal:      General: There is no distension.   Genitourinary:     Comments: KODY deferred, s/p CRT  Musculoskeletal:         General: Normal range of motion.   Neurological:      General: No focal deficit present.      Mental Status: alert and oriented  Psychiatric:         Mood and Affect: Mood normal.         Behavior: Behavior normal.      Imaging: I have personally reviewed the patient's available images and reports and summarized pertinent findings above in HPI.      Laboratory: I have personally reviewed the patient's available laboratory values and summarized pertinent findings above in HPI.      I spent approximately 30 minutes reviewing the available records and evaluating the patient, out of which over 50% of the time was spent face to  face with the patient in counseling and coordinating this patient's care.     Thank you for the opportunity to care for this patient. Please do not hesitate to contact me with any questions.     Kevin Lopez MD/PhD

## 2025-02-05 NOTE — TELEPHONE ENCOUNTER
Per Edilia Rudolph and John, need PET scan [for mediastinal lesion].  First available 2/19 at [facility].  Pt ok with it.   
No

## 2025-02-10 ENCOUNTER — OFFICE VISIT (OUTPATIENT)
Dept: HEMATOLOGY/ONCOLOGY | Facility: CLINIC | Age: 50
End: 2025-02-10
Payer: COMMERCIAL

## 2025-02-10 ENCOUNTER — LAB VISIT (OUTPATIENT)
Dept: LAB | Facility: HOSPITAL | Age: 50
End: 2025-02-10
Payer: COMMERCIAL

## 2025-02-10 VITALS
DIASTOLIC BLOOD PRESSURE: 81 MMHG | BODY MASS INDEX: 35.68 KG/M2 | OXYGEN SATURATION: 96 % | SYSTOLIC BLOOD PRESSURE: 132 MMHG | WEIGHT: 227.31 LBS | HEART RATE: 90 BPM | HEIGHT: 67 IN

## 2025-02-10 DIAGNOSIS — C78.02 MALIGNANT NEOPLASM METASTATIC TO LEFT LUNG: ICD-10-CM

## 2025-02-10 DIAGNOSIS — C20 RECTAL CANCER: Primary | ICD-10-CM

## 2025-02-10 DIAGNOSIS — F41.1 GENERALIZED ANXIETY DISORDER: ICD-10-CM

## 2025-02-10 DIAGNOSIS — Z79.899 IMMUNODEFICIENCY SECONDARY TO CHEMOTHERAPY: ICD-10-CM

## 2025-02-10 DIAGNOSIS — T45.1X5A IMMUNODEFICIENCY SECONDARY TO CHEMOTHERAPY: ICD-10-CM

## 2025-02-10 DIAGNOSIS — D50.9 MICROCYTIC ANEMIA: ICD-10-CM

## 2025-02-10 DIAGNOSIS — K58.1 IRRITABLE BOWEL SYNDROME WITH CONSTIPATION: ICD-10-CM

## 2025-02-10 DIAGNOSIS — J30.9 ALLERGIC RHINITIS, UNSPECIFIED SEASONALITY, UNSPECIFIED TRIGGER: ICD-10-CM

## 2025-02-10 DIAGNOSIS — N20.0 NEPHROLITHIASIS: ICD-10-CM

## 2025-02-10 DIAGNOSIS — D84.81 IMMUNODEFICIENCY SECONDARY TO NEOPLASM: ICD-10-CM

## 2025-02-10 DIAGNOSIS — I10 HYPERTENSION, UNSPECIFIED TYPE: ICD-10-CM

## 2025-02-10 DIAGNOSIS — D84.821 IMMUNODEFICIENCY SECONDARY TO CHEMOTHERAPY: ICD-10-CM

## 2025-02-10 DIAGNOSIS — C20 RECTAL CANCER: ICD-10-CM

## 2025-02-10 DIAGNOSIS — D49.9 IMMUNODEFICIENCY SECONDARY TO NEOPLASM: ICD-10-CM

## 2025-02-10 DIAGNOSIS — E78.5 HYPERLIPIDEMIA, UNSPECIFIED HYPERLIPIDEMIA TYPE: ICD-10-CM

## 2025-02-10 LAB
ALBUMIN SERPL BCP-MCNC: 3.8 G/DL (ref 3.5–5.2)
ALP SERPL-CCNC: 138 U/L (ref 40–150)
ALT SERPL W/O P-5'-P-CCNC: 45 U/L (ref 10–44)
ANION GAP SERPL CALC-SCNC: 10 MMOL/L (ref 8–16)
AST SERPL-CCNC: 31 U/L (ref 10–40)
BILIRUB SERPL-MCNC: 0.3 MG/DL (ref 0.1–1)
BUN SERPL-MCNC: 17 MG/DL (ref 6–20)
CALCIUM SERPL-MCNC: 9.2 MG/DL (ref 8.7–10.5)
CEA SERPL-MCNC: 1.8 NG/ML (ref 0–5)
CHLORIDE SERPL-SCNC: 102 MMOL/L (ref 95–110)
CO2 SERPL-SCNC: 26 MMOL/L (ref 23–29)
CREAT SERPL-MCNC: 0.8 MG/DL (ref 0.5–1.4)
ERYTHROCYTE [DISTWIDTH] IN BLOOD BY AUTOMATED COUNT: 15 % (ref 11.5–14.5)
EST. GFR  (NO RACE VARIABLE): >60 ML/MIN/1.73 M^2
GLUCOSE SERPL-MCNC: 96 MG/DL (ref 70–110)
HCT VFR BLD AUTO: 39.8 % (ref 37–48.5)
HGB BLD-MCNC: 12.6 G/DL (ref 12–16)
IMM GRANULOCYTES # BLD AUTO: 0.04 K/UL (ref 0–0.04)
MCH RBC QN AUTO: 28.1 PG (ref 27–31)
MCHC RBC AUTO-ENTMCNC: 31.7 G/DL (ref 32–36)
MCV RBC AUTO: 89 FL (ref 82–98)
NEUTROPHILS # BLD AUTO: 7.5 K/UL (ref 1.8–7.7)
PLATELET # BLD AUTO: 220 K/UL (ref 150–450)
PMV BLD AUTO: 10.8 FL (ref 9.2–12.9)
POTASSIUM SERPL-SCNC: 4.3 MMOL/L (ref 3.5–5.1)
PROT SERPL-MCNC: 7.3 G/DL (ref 6–8.4)
RBC # BLD AUTO: 4.48 M/UL (ref 4–5.4)
SODIUM SERPL-SCNC: 138 MMOL/L (ref 136–145)
WBC # BLD AUTO: 9.93 K/UL (ref 3.9–12.7)

## 2025-02-10 PROCEDURE — 1160F RVW MEDS BY RX/DR IN RCRD: CPT | Mod: CPTII,S$GLB,, | Performed by: PHYSICIAN ASSISTANT

## 2025-02-10 PROCEDURE — 99999 PR PBB SHADOW E&M-EST. PATIENT-LVL IV: CPT | Mod: PBBFAC,,, | Performed by: PHYSICIAN ASSISTANT

## 2025-02-10 PROCEDURE — 82378 CARCINOEMBRYONIC ANTIGEN: CPT | Performed by: INTERNAL MEDICINE

## 2025-02-10 PROCEDURE — 1159F MED LIST DOCD IN RCRD: CPT | Mod: CPTII,S$GLB,, | Performed by: PHYSICIAN ASSISTANT

## 2025-02-10 PROCEDURE — 3008F BODY MASS INDEX DOCD: CPT | Mod: CPTII,S$GLB,, | Performed by: PHYSICIAN ASSISTANT

## 2025-02-10 PROCEDURE — 85027 COMPLETE CBC AUTOMATED: CPT | Performed by: INTERNAL MEDICINE

## 2025-02-10 PROCEDURE — 3079F DIAST BP 80-89 MM HG: CPT | Mod: CPTII,S$GLB,, | Performed by: PHYSICIAN ASSISTANT

## 2025-02-10 PROCEDURE — G2211 COMPLEX E/M VISIT ADD ON: HCPCS | Mod: S$GLB,,, | Performed by: PHYSICIAN ASSISTANT

## 2025-02-10 PROCEDURE — 3075F SYST BP GE 130 - 139MM HG: CPT | Mod: CPTII,S$GLB,, | Performed by: PHYSICIAN ASSISTANT

## 2025-02-10 PROCEDURE — 80053 COMPREHEN METABOLIC PANEL: CPT | Performed by: INTERNAL MEDICINE

## 2025-02-10 PROCEDURE — 36415 COLL VENOUS BLD VENIPUNCTURE: CPT | Performed by: INTERNAL MEDICINE

## 2025-02-10 PROCEDURE — 99215 OFFICE O/P EST HI 40 MIN: CPT | Mod: S$GLB,,, | Performed by: PHYSICIAN ASSISTANT

## 2025-02-10 NOTE — PROGRESS NOTES
MEDICAL ONCOLOGY - ESTABLISHED PATIENT VISIT    Reason for visit: rectal cancer     Best Contact Phone Number(s): [phone] (home)      Cancer/Stage/TNM:    Cancer Staging   Rectal adenocarcinoma  Staging form: Colon and Rectum, AJCC 8th Edition  - Clinical stage from 6/21/2024: Stage NAVID (cT2, cN2b, cM1a) - Signed by Dion Rudolph MD on 7/24/2024  - Pathologic: No stage assigned - Unsigned       Oncology History   Rectal adenocarcinoma   6/21/2024 Procedure    Colonoscopy - mild diverticular disease, mass in distal rectum (Complete)     6/21/2024 Cancer Staged    Staging form: Colon and Rectum, AJCC 8th Edition  - Clinical stage from 6/21/2024: Stage NAVID (cT2, cN2b, cM1a)     6/24/2024 Initial Diagnosis    Rectal adenocarcinoma  ERROL  - Colonoscopy 6/21/24: infiltrative, ulcerated, sub-mucosal, fungating and villous bleeding mass found in distal rectum, 6-7cm from the anus proximally.   - Distal rectal mass biopsy 6/21/24: well differentiated adenocarcinoma, negative for LVI and perineural invasion. IHC MSI-H low probability with intact nuclear expressions of MLH1, MSH2, MSH6, and PMS2.        7/15/2024 Imaging Significant Findings    MRI Rectal cancer  Low rectal tumor invading the internal sphincter in close approximation to the mesorectal fascia without definite involvement.  Multiple suspicious mesorectal and superior rectal nodes.     *MR STAGE     T: T1/T2 (tumor confined to rectal wall)     N: N+ (short axis ? 9 mm)     Blayne statement: Multiple suspicious mesorectal and superior rectal nodes.     *MRF: threatened (tumor margin within 1-2 mm of MRF) noting this is a low rectal tumor.  No definite evidence of invasion.     Sphincter Involvement: Yes involvement the internal sphincter     Suspicious Extramesorectal Lymph Nodes: No     EMVI: No     7/16/2024 Tumor Markers    Patient's tumor was tested for the following markers: CEA                                              Results of the tumor  marker test revealed 2.9     7/17/2024 Imaging Significant Findings    CT CAP  In this patient with a known history of rectal malignancy, there are few borderline prominent lymph nodes adjacent to the rectum measuring up to 8 mm.  There are solid nodules in both lungs, the largest in the lingula measuring up to 1 cm.  Given the patient's primary malignancy, pulmonary metastatic disease cannot be excluded.  Follow-up based upon oncologic guidance.     Prominent soft tissue density in the anterior superior mediastinum which could possibly represent residual thymic tissue with a focal solid structure measuring up to 1.0 x 1.7 cm, possibly an enlarged lymph node versus other soft tissue mass.     Indeterminate focal regions of sclerosis within the T10 vertebra and right sacrum.  Osseous metastatic disease cannot be excluded.     Cholelithiasis without CT evidence for cholecystitis.     Large hiatal hernia.  Diverticulosis coli without diverticulitis.     Large left UPJ stone measuring up to 1.2 cm without hydronephrosis or hydroureter.     7/19/2024 Imaging Significant Findings    PET CT  Hypermetabolic soft tissue thickening along the rectal wall compatible with known malignancy.  There are a few prominent perirectal mesorectal lymph nodes without significant hypermetabolic activity, please note some may be too small for characterization by PET.     0.9 cm lingular pulmonary nodule with mild hypermetabolic activity and prominent hypermetabolic mediastinal lymph node, concerning for metastatic foci.     8/12/2024 -  Chemotherapy    Treatment Summary   Plan Name: OP GI mFOLFOX6 (oxaliplatin leucovorin fluorouracil) with bevacizumab Q2W  Treatment Goal: Palliative  Status: Active  Start Date: 8/12/2024  End Date: 8/28/2025 (Planned)  Provider: Dion Rudolph MD  Chemotherapy: oxaliplatin (ELOXATIN) 85 mg/m2 = 194 mg in D5W 603.8 mL chemo infusion, 85 mg/m2 = 194 mg, Intravenous, Clinic/HOD 1 time, 4 of 4  cycles  Administration: 194 mg (8/12/2024), 194 mg (8/28/2024), 194 mg (9/12/2024), 194 mg (9/25/2024)  fluorouracil (Adrucil) 2,400 mg/m2 = 5,470 mg in 0.9% NaCl 240 mL chemo infusion, 2,400 mg/m2 = 5,470 mg, Intravenous, Clinic/HOD 1 time, 9 of 24 cycles  Administration: 5,470 mg (8/12/2024), 5,470 mg (8/28/2024), 5,470 mg (9/12/2024), 5,470 mg (9/25/2024), 5,470 mg (10/23/2024), 5,470 mg (11/6/2024), 5,470 mg (11/20/2024), 5,470 mg (12/4/2024), 5,470 mg (1/28/2025)  bevacizumab-awwb (MVASI) 500 mg in 0.9% NaCl 100 mL infusion, 550 mg, Intravenous, Clinic/HOD 1 time, 8 of 23 cycles  Administration: 500 mg (8/12/2024), 500 mg (8/28/2024), 500 mg (9/12/2024), 500 mg (9/25/2024), 500 mg (10/23/2024), 500 mg (11/6/2024), 500 mg (11/20/2024), 500 mg (1/28/2025)     12/18/2024 - 12/24/2024 Radiation Therapy    Treating physician: Kevin Lopez  Course: C1 Pelvis 2024  Treatment Site Ref. ID Energy Dose/Fx (Gy) #Fx Dose Correction (Gy) Total Dose (Gy) Start Date End Date Elapsed Days   IM Rectum PTV_High 6X 5 5 / 5 0 25 12/18/2024 12/24/2024 6           Interval History:   Ms. Segura returns for follow up prior to cycle 10 of restarting maintenance 5-FU + cherelle after completing radiation on 12/24/24. She is feeling well overall and tolerating treatment well. Currently taking Cipro 500 mg BID x 7 days and diflucan for a bladder infection. She is also taking pyridium with relief.  She is tolerating the treatment well. No fever. Does have mild nausea without vomiting. She is otherwise not having any new pain or bleeding.    Patient presents to visit with her caregiver. ECOG PS is 0.    ROS:   Review of Systems   Constitutional:  Positive for malaise/fatigue. Negative for chills, fever and weight loss.   HENT:  Negative for hearing loss.    Eyes:  Negative for blurred vision.   Respiratory:  Negative for cough, hemoptysis and shortness of breath.    Cardiovascular:  Negative for chest pain.   Gastrointestinal:  Positive  "for constipation. Negative for abdominal pain, blood in stool, nausea and vomiting.   Genitourinary:  Negative for dysuria, frequency and urgency.   Musculoskeletal:  Negative for back pain and myalgias.   Skin:  Negative for rash.   Neurological:  Negative for dizziness, tingling, weakness and headaches.   Endo/Heme/Allergies:  Does not bruise/bleed easily.   Psychiatric/Behavioral:  Negative for depression. The patient is not nervous/anxious.        Past Medical History:   Past Medical History:   Diagnosis Date    Anxiety disorder, unspecified     Fibroids     HTN (hypertension)     Sinus problem         Past Surgical History:   Past Surgical History:   Procedure Laterality Date    BILATERAL SALPINGO-OOPHORECTOMY (BSO) Bilateral 12/9/2020    Procedure: SALPINGO-OOPHORECTOMY, BILATERAL;  Surgeon: Yun Recio MD;  Location: [facility];  Service: OB/GYN;  Laterality: Bilateral;  COVID NEGATIVE 12/3    INCISION OF UVULA      INSERTION OF TUNNELED CENTRAL VENOUS CATHETER (CVC) WITH SUBCUTANEOUS PORT Right 7/29/2024    Procedure: INSERTION, SINGLE LUMEN CATHETER WITH PORT, WITH FLUOROSCOPIC GUIDANCE;  Surgeon: Russel Layton MD;  Location: [address];  Service: General;  Laterality: Right;    knee arhroscopy      NASAL SEPTUM SURGERY      SINUS SURGERY      TONSILLECTOMY      TOTAL ABDOMINAL HYSTERECTOMY N/A 12/9/2020    Procedure: HYSTERECTOMY, TOTAL, ABDOMINAL;  Surgeon: Yun Recio MD;  Location: [facility];  Service: OB/GYN;  Laterality: N/A;    UTERINE FIBROID SURGERY          Family History:   Family History   Problem Relation Name Age of Onset    Asthma Mother      Hypertension Mother      COPD Father      Diabetes Father      Hypertension Father      Esophageal cancer Maternal Uncle Jossue     Colon polyps Maternal Grandfather          "every colonoscopy"    Bone cancer Paternal Grandmother      Kidney cancer Maternal Aunt Sherlyn 59        kidney removal    Skin cancer Maternal Uncle Tim     "     possibly melanoma  recurrent    Throat cancer Maternal Uncle Medardo         late 20s - early 30s    Lung cancer Maternal Uncle Maksim         squamous cell  smoke exposure    Leukemia Maternal Uncle Kendall     Colon cancer Neg Hx          Social History:   Social History     Tobacco Use    Smoking status: Never    Smokeless tobacco: Never   Substance Use Topics    Alcohol use: Yes     Comment: Socially        I have reviewed and updated the patient's past medical, surgical, family and social histories.    Allergies:   Review of patient's allergies indicates:  No Known Allergies     Medications:   Current Outpatient Medications   Medication Sig Dispense Refill    cetirizine (ZYRTEC) 10 MG tablet Take 10 mg by mouth daily as needed.       cholecalciferol, vitamin D3, 5,000 unit Tab Take 5,000 Units by mouth once daily.      citalopram (CELEXA) 20 MG tablet Take 20 mg by mouth every morning.      EVAMIST 1.53 mg/spray (1.7%) transdermal spray SPRAY 2 SPRAYS TO FOREARM DAILY      hydroCHLOROthiazide (HYDRODIURIL) 25 MG tablet Take 25 mg by mouth every morning.      LIDOcaine-prilocaine (EMLA) cream Apply topically as needed. 30 g 3    LORazepam (ATIVAN) 0.5 MG tablet Take 1 tablet (0.5 mg total) by mouth every 12 (twelve) hours as needed for Anxiety. 5 tablet 0    niacin 50 MG Tab niacin      niacin 50 MG Tab       ondansetron (ZOFRAN-ODT) 8 MG TbDL Take 1 tablet (8 mg total) by mouth every 6 (six) hours as needed (chemotherapy induced nausea/vomiting). 60 tablet 4    oxyCODONE (ROXICODONE) 10 mg Tab immediate release tablet Take 1 tablet (10 mg total) by mouth every 4 (four) hours as needed. 180 tablet 0    phenazopyridine (PYRIDIUM) 200 MG tablet Take 1 tablet (200 mg total) by mouth 3 (three) times daily with meals. 30 tablet 2    rosuvastatin (CRESTOR) 10 MG tablet Take by mouth every evening.      fluticasone (FLONASE) 50 mcg/actuation nasal spray 1 spray (50 mcg total) by Each Nare route once daily. (Patient not  "taking: Reported on 9/10/2024) 16 g 0    omega 3-dha-epa-fish oil (FISH OIL) 1,000 mg (120 mg-180 mg) Cap Take by mouth once daily.       No current facility-administered medications for this visit.      Physical Exam:   /81 (Patient Position: Sitting)   Pulse 90   Ht 5' 7" (1.702 m)   Wt 103.1 kg (227 lb 4.7 oz)   LMP 11/18/2020   SpO2 96%   BMI 35.60 kg/m²          Physical Exam  Constitutional:       General: She is not in acute distress.     Appearance: Normal appearance. She is well-developed and normal weight. She is not ill-appearing, toxic-appearing or diaphoretic.   HENT:      Head: Normocephalic and atraumatic.      Right Ear: External ear normal.      Left Ear: External ear normal.   Eyes:      General: No scleral icterus.     Conjunctiva/sclera: Conjunctivae normal.   Pulmonary:      Effort: Pulmonary effort is normal. No respiratory distress.   Skin:     Capillary Refill: Capillary refill takes less than 2 seconds.   Neurological:      Mental Status: She is alert and oriented to person, place, and time.      Gait: Gait is intact.   Psychiatric:         Mood and Affect: Mood normal.         Behavior: Behavior normal.         Labs:   No results found for this or any previous visit (from the past 48 hours).    Labs pending      Imaging:      CT CAP - 1/24/25:    Impression:     No detrimental change.  Unchanged pulmonary nodules and anterior mediastinal nodule.  Continued attention on follow-up recommended     Cholelithiasis.    Path:   Distal rectal mass biopsy 6/21/24: well differentiated adenocarcinoma, negative for LVI and perineural invasion. IHC MSI-H low probability with intact nuclear expressions of MLH1, MSH2, MSH6, and PMS2.       Assessment:       1. Rectal cancer    2. Malignant neoplasm metastatic to left lung    3. 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 Microcytic anemia    6. Nephrolithiasis    7. Generalized anxiety disorder "    8. Hypertension, unspecified type    9. Hyperlipidemia, unspecified hyperlipidemia type    10. Allergic rhinitis, unspecified seasonality, unspecified trigger    11. Irritable bowel syndrome with constipation           Plan:         # Rectal adenocarcinoma  Imelda Kimble is a 49 y.o. woman with distal rectal G1 adenocarcinoma pMMR, 5.6 cm long, below the anterior peritoneal reflection, MRF threatened, with involvement the internal sphincter, EMVI-, with multiple (8+) small suspicious mesorectal and superior rectal nodes.     She was in her usual health and went to PCP for annual screening in 2024. PCP recommended screening colonoscopy as she is >45. 2 weeks prior to her screening colonoscopy, she noticed hematochezia and rectal pain. Her only symptom prior to this was constipation, which is lifelong. Found to have distal rectal tumor as above.    PET/CT 7/19/24 showed hypermetabolism of the primary rectal tumor with small mesorectal lymph nodes.  Additional 9mm L lung nodule and mediastinal lymph node were hypermetabolic.    We discussed her at tumor board with recommendations for induction chemotherapy and then potential local therapy (chemoradiation) to rectum and SBRT/surgery to lung.    Had port placed on 7/29/24    Received cycle 1 FOLFOX + Avastin on 8/12/24, then moved to maintenance 5-FU plus avastin. Last dose of Oxaliplatin was given on 9/25/2024  Repeat imaging after cycle 4 shows good response in mesorectal lymph nodes and L lingular lung metastasis.  Had restaging MRI of pelvis rectum that displays very good response to treatment.   Received 8 cycles of chemo, 5-FU only with last cycle.     Completed short course radiation on 12/24/24.   CT CAP 1/24/25 shows stable disease.    Discussed options of taking treatment break given low volume burden vs restarting maintenance 5-FU + cherelle vs chemo + consideration of consolidative local therapies to lung met and anterior mediastinal lymph node. Will discuss  these options further after the review of the PET/CT.     Doing well overall. Ms. Segura returns for follow up prior to cycle 10 of restarting maintenance 5-FU + cherelle after completing radiation on 12/24/24. She is feeling well overall and tolerating treatment well. Currently taking Cipro 500 mg BID x 7 days and diflucan for a bladder infection. She is also taking pyridium with relief.  She is tolerating the treatment well. No fever. Does have mild nausea without vomiting. She is otherwise not having any new pain or bleeding. Overall, doing well.   I have personally reviewed the patient's lab work today, including CBC and CMP. ANC is adequate for treatment   Proceed with cycle 10 - maintenance 5-FU + bevacizumab.  Will discuss further care options with Dr. Menendez + Dr. Lopez.    RTC in 2 weeks with lab work, PET/CT and treatment discussion with Dr Menendez, Dr Rudolph and Dr Lopez, +/- chemo.   Pt would like to continue treatment in [place].     # Microcytic anemia, nephrolithiasis, UTI  Suspect MICHELLE. Counts normalized today. Monitor.    Doing better. Ureteral stent was removed on 10/28/2024.     Currently taking Cipro 500 mg BID x 7 days and diflucan for a bladder infection. She is also taking pyridium with relief.  She is tolerating the treatment well. No fever. Does have mild nausea without vomiting. She is otherwise not having any new pain or bleeding.    Other chronic disease managed by PCP   # Generalized anxiety disorder - continue citalopram   # HTN - HCTZ   # HLD - statin  # allergic rhinitis - zyrtec and flonase, stable   # IBS-Constipation - Stable    Follow up: 2 weeks with labs, scans and chemo    Patient and family members displayed understanding of the above encounter and treatment plan. All thoughtful questions were answered to their satisfaction. Patient was advised to notify the care team or proceed to the ER if signs and symptoms worsen.     30 minutes were spent today on this encounter  including face to face time with the patient, data gathering/interpretation and documentation. Greater than 50% of this time involved counseling or coordination of care. I have provided the patient with an opportunity to ask questions and have all questions answered to patient's satisfaction.     Visit today included increased complexity associated with the care of the episodic problem chemotherapy  addressed and managing the longitudinal care of the patient due to the serious and/or complex managed problem(s) GI malignancies/cancer. In addition, the above encounter addresses an illness that poses a significant threat to life, bodily function and overall performance status.       VIKASH Guzman, PA-C  [facility]  Dept of Hematology/Oncology  PAARELY to GI Oncology team         Route Chart for Scheduling    Med Onc Chart Routing      Follow up with physician 4 weeks and 6 weeks. Dr Rudolph will discuss PET/CT results with her as well in 2 weeks. 5-FU, Mvasi at 4 and 6 weeks   Follow up with LEIDA 2 weeks. 5-FU, Mvasi   Infusion scheduling note    Injection scheduling note    Labs CBC, CMP, urinalysis and CEA   Scheduling:  Preferred lab:  Lab interval: every 2 weeks     Imaging PET scan   Scheduled in 2 weeks   Pharmacy appointment    Other referrals              Treatment Plan Information   OP GI mFOLFOX6 (oxaliplatin leucovorin fluorouracil) with bevacizumab Q2W Dion Rudolph MD   Associated diagnosis: Rectal adenocarcinoma Stage NAVID cT2, cN2b, cM1a noted on 7/15/2024   Line of treatment: First Line  Treatment Goal: Palliative     Upcoming Treatment Dates - OP GI mFOLFOX6 (oxaliplatin leucovorin fluorouracil) with bevacizumab Q2W    2/11/2025       Chemotherapy       bevacizumab-awwb (MVASI) 5 mg/kg = 550 mg in 0.9% NaCl 100 mL infusion       fluorouracil (Adrucil) 2,400 mg/m2 = 5,470 mg in 0.9% NaCl 240 mL chemo infusion       Antiemetics       palonosetron (ALOXI) 0.25 mg with Dexamethasone  (DECADRON) 12 mg in NS 50 mL IVPB  2/25/2025       Chemotherapy       bevacizumab-awwb (MVASI) 5 mg/kg = 550 mg in 0.9% NaCl 100 mL infusion       fluorouracil (Adrucil) 2,400 mg/m2 = 5,470 mg in 0.9% NaCl 240 mL chemo infusion       Antiemetics       palonosetron (ALOXI) 0.25 mg with Dexamethasone (DECADRON) 12 mg in NS 50 mL IVPB  3/11/2025       Chemotherapy       bevacizumab-awwb (MVASI) 5 mg/kg = 550 mg in 0.9% NaCl 100 mL infusion       fluorouracil (Adrucil) 2,400 mg/m2 = 5,470 mg in 0.9% NaCl 240 mL chemo infusion       Antiemetics       palonosetron (ALOXI) 0.25 mg with Dexamethasone (DECADRON) 12 mg in NS 50 mL IVPB  3/25/2025       Chemotherapy       bevacizumab-awwb (MVASI) 5 mg/kg = 550 mg in 0.9% NaCl 100 mL infusion       fluorouracil (Adrucil) 2,400 mg/m2 = 5,470 mg in 0.9% NaCl 240 mL chemo infusion       Antiemetics       palonosetron (ALOXI) 0.25 mg with Dexamethasone (DECADRON) 12 mg in NS 50 mL IVPB

## 2025-02-18 ENCOUNTER — PATIENT MESSAGE (OUTPATIENT)
Dept: HEMATOLOGY/ONCOLOGY | Facility: CLINIC | Age: 50
End: 2025-02-18
Payer: COMMERCIAL

## 2025-02-21 ENCOUNTER — PATIENT MESSAGE (OUTPATIENT)
Dept: HEMATOLOGY/ONCOLOGY | Facility: CLINIC | Age: 50
End: 2025-02-21
Payer: COMMERCIAL

## 2025-02-24 ENCOUNTER — OFFICE VISIT (OUTPATIENT)
Dept: HEMATOLOGY/ONCOLOGY | Facility: CLINIC | Age: 50
End: 2025-02-24
Payer: COMMERCIAL

## 2025-02-24 ENCOUNTER — PATIENT MESSAGE (OUTPATIENT)
Dept: HEMATOLOGY/ONCOLOGY | Facility: CLINIC | Age: 50
End: 2025-02-24

## 2025-02-24 ENCOUNTER — LAB VISIT (OUTPATIENT)
Dept: LAB | Facility: HOSPITAL | Age: 50
End: 2025-02-24
Attending: INTERNAL MEDICINE
Payer: COMMERCIAL

## 2025-02-24 DIAGNOSIS — J30.9 ALLERGIC RHINITIS, UNSPECIFIED SEASONALITY, UNSPECIFIED TRIGGER: ICD-10-CM

## 2025-02-24 DIAGNOSIS — D50.9 MICROCYTIC ANEMIA: ICD-10-CM

## 2025-02-24 DIAGNOSIS — D49.9 IMMUNODEFICIENCY SECONDARY TO NEOPLASM: ICD-10-CM

## 2025-02-24 DIAGNOSIS — C20 RECTAL CANCER: ICD-10-CM

## 2025-02-24 DIAGNOSIS — Z79.899 IMMUNODEFICIENCY SECONDARY TO CHEMOTHERAPY: ICD-10-CM

## 2025-02-24 DIAGNOSIS — N20.0 NEPHROLITHIASIS: ICD-10-CM

## 2025-02-24 DIAGNOSIS — E78.5 HYPERLIPIDEMIA, UNSPECIFIED HYPERLIPIDEMIA TYPE: ICD-10-CM

## 2025-02-24 DIAGNOSIS — N30.90 CYSTITIS: ICD-10-CM

## 2025-02-24 DIAGNOSIS — C20 RECTAL CANCER: Primary | ICD-10-CM

## 2025-02-24 DIAGNOSIS — D84.821 IMMUNODEFICIENCY SECONDARY TO CHEMOTHERAPY: ICD-10-CM

## 2025-02-24 DIAGNOSIS — C78.02 MALIGNANT NEOPLASM METASTATIC TO LEFT LUNG: ICD-10-CM

## 2025-02-24 DIAGNOSIS — T45.1X5A IMMUNODEFICIENCY SECONDARY TO CHEMOTHERAPY: ICD-10-CM

## 2025-02-24 DIAGNOSIS — C20 RECTAL ADENOCARCINOMA: ICD-10-CM

## 2025-02-24 DIAGNOSIS — F41.1 GENERALIZED ANXIETY DISORDER: ICD-10-CM

## 2025-02-24 DIAGNOSIS — G89.3 NEOPLASM RELATED PAIN: ICD-10-CM

## 2025-02-24 DIAGNOSIS — K58.1 IRRITABLE BOWEL SYNDROME WITH CONSTIPATION: ICD-10-CM

## 2025-02-24 DIAGNOSIS — D84.81 IMMUNODEFICIENCY SECONDARY TO NEOPLASM: ICD-10-CM

## 2025-02-24 DIAGNOSIS — I10 HYPERTENSION, UNSPECIFIED TYPE: ICD-10-CM

## 2025-02-24 LAB
ALBUMIN SERPL BCP-MCNC: 3.7 G/DL (ref 3.5–5.2)
ALP SERPL-CCNC: 107 U/L (ref 40–150)
ALT SERPL W/O P-5'-P-CCNC: 24 U/L (ref 10–44)
ANION GAP SERPL CALC-SCNC: 9 MMOL/L (ref 8–16)
AST SERPL-CCNC: 20 U/L (ref 10–40)
BILIRUB SERPL-MCNC: 0.3 MG/DL (ref 0.1–1)
BILIRUB UR QL STRIP: NEGATIVE
BUN SERPL-MCNC: 13 MG/DL (ref 6–20)
CALCIUM SERPL-MCNC: 9.5 MG/DL (ref 8.7–10.5)
CHLORIDE SERPL-SCNC: 99 MMOL/L (ref 95–110)
CLARITY UR: CLEAR
CO2 SERPL-SCNC: 31 MMOL/L (ref 23–29)
COLOR UR: YELLOW
CREAT SERPL-MCNC: 0.8 MG/DL (ref 0.5–1.4)
ERYTHROCYTE [DISTWIDTH] IN BLOOD BY AUTOMATED COUNT: 15.1 % (ref 11.5–14.5)
EST. GFR  (NO RACE VARIABLE): >60 ML/MIN/1.73 M^2
GLUCOSE SERPL-MCNC: 131 MG/DL (ref 70–110)
GLUCOSE UR QL STRIP: NEGATIVE
HCT VFR BLD AUTO: 38.4 % (ref 37–48.5)
HGB BLD-MCNC: 12.5 G/DL (ref 12–16)
HGB UR QL STRIP: NEGATIVE
IMM GRANULOCYTES # BLD AUTO: 0.03 K/UL (ref 0–0.04)
KETONES UR QL STRIP: NEGATIVE
LEUKOCYTE ESTERASE UR QL STRIP: NEGATIVE
MCH RBC QN AUTO: 28.8 PG (ref 27–31)
MCHC RBC AUTO-ENTMCNC: 32.6 G/DL (ref 32–36)
MCV RBC AUTO: 89 FL (ref 82–98)
NEUTROPHILS # BLD AUTO: 5.1 K/UL (ref 1.8–7.7)
NITRITE UR QL STRIP: NEGATIVE
PH UR STRIP: 6 [PH] (ref 5–8)
PLATELET # BLD AUTO: 219 K/UL (ref 150–450)
PMV BLD AUTO: 10.6 FL (ref 9.2–12.9)
POTASSIUM SERPL-SCNC: 3.9 MMOL/L (ref 3.5–5.1)
PROT SERPL-MCNC: 7.4 G/DL (ref 6–8.4)
PROT UR QL STRIP: NEGATIVE
RBC # BLD AUTO: 4.34 M/UL (ref 4–5.4)
SODIUM SERPL-SCNC: 139 MMOL/L (ref 136–145)
SP GR UR STRIP: 1.02 (ref 1–1.03)
URN SPEC COLLECT METH UR: NORMAL
UROBILINOGEN UR STRIP-ACNC: NEGATIVE EU/DL
WBC # BLD AUTO: 6.93 K/UL (ref 3.9–12.7)

## 2025-02-24 PROCEDURE — 36415 COLL VENOUS BLD VENIPUNCTURE: CPT | Performed by: INTERNAL MEDICINE

## 2025-02-24 PROCEDURE — 81003 URINALYSIS AUTO W/O SCOPE: CPT | Performed by: INTERNAL MEDICINE

## 2025-02-24 PROCEDURE — 80053 COMPREHEN METABOLIC PANEL: CPT | Performed by: INTERNAL MEDICINE

## 2025-02-24 PROCEDURE — 1159F MED LIST DOCD IN RCRD: CPT | Mod: CPTII,95,, | Performed by: REGISTERED NURSE

## 2025-02-24 PROCEDURE — 82378 CARCINOEMBRYONIC ANTIGEN: CPT | Performed by: INTERNAL MEDICINE

## 2025-02-24 PROCEDURE — G2211 COMPLEX E/M VISIT ADD ON: HCPCS | Mod: 95,,, | Performed by: REGISTERED NURSE

## 2025-02-24 PROCEDURE — 1160F RVW MEDS BY RX/DR IN RCRD: CPT | Mod: CPTII,95,, | Performed by: REGISTERED NURSE

## 2025-02-24 PROCEDURE — 85027 COMPLETE CBC AUTOMATED: CPT | Performed by: INTERNAL MEDICINE

## 2025-02-24 PROCEDURE — 98007 SYNCH AUDIO-VIDEO EST HI 40: CPT | Mod: 95,,, | Performed by: REGISTERED NURSE

## 2025-02-24 RX ORDER — HEPARIN 100 UNIT/ML
500 SYRINGE INTRAVENOUS
Status: CANCELLED | OUTPATIENT
Start: 2025-02-25

## 2025-02-24 RX ORDER — PROCHLORPERAZINE EDISYLATE 5 MG/ML
10 INJECTION INTRAMUSCULAR; INTRAVENOUS ONCE AS NEEDED
Status: CANCELLED | OUTPATIENT
Start: 2025-02-27

## 2025-02-24 RX ORDER — HEPARIN 100 UNIT/ML
500 SYRINGE INTRAVENOUS
Status: CANCELLED | OUTPATIENT
Start: 2025-02-27

## 2025-02-24 RX ORDER — SODIUM CHLORIDE 0.9 % (FLUSH) 0.9 %
10 SYRINGE (ML) INJECTION
Status: CANCELLED | OUTPATIENT
Start: 2025-02-27

## 2025-02-24 RX ORDER — DIPHENHYDRAMINE HYDROCHLORIDE 50 MG/ML
50 INJECTION INTRAMUSCULAR; INTRAVENOUS ONCE AS NEEDED
Status: CANCELLED | OUTPATIENT
Start: 2025-02-25

## 2025-02-24 RX ORDER — EPINEPHRINE 0.3 MG/.3ML
0.3 INJECTION SUBCUTANEOUS ONCE AS NEEDED
Status: CANCELLED | OUTPATIENT
Start: 2025-02-25

## 2025-02-24 RX ORDER — PROCHLORPERAZINE EDISYLATE 5 MG/ML
10 INJECTION INTRAMUSCULAR; INTRAVENOUS ONCE AS NEEDED
Status: CANCELLED | OUTPATIENT
Start: 2025-02-25

## 2025-02-24 RX ORDER — SODIUM CHLORIDE 0.9 % (FLUSH) 0.9 %
10 SYRINGE (ML) INJECTION
Status: CANCELLED | OUTPATIENT
Start: 2025-02-25

## 2025-02-24 NOTE — PROGRESS NOTES
The patient location is: car in [place]  The chief complaint leading to consultation is: lab review, treatment follow up and clearance     Visit type: audiovisual    Each patient to whom he or she provides medical services by telemedicine is:  (1) informed of the relationship between the physician and patient and the respective role of any other health care provider with respect to management of the patient; and (2) notified that he or she may decline to receive medical services by telemedicine and may withdraw from such care at any time.    Notes:     MEDICAL ONCOLOGY - ESTABLISHED PATIENT VISIT    Reason for visit: rectal cancer     Best Contact Phone Number(s): [phone] (home)      Cancer/Stage/TNM:    Cancer Staging   Rectal adenocarcinoma  Staging form: Colon and Rectum, AJCC 8th Edition  - Clinical stage from 6/21/2024: Stage NAVID (cT2, cN2b, cM1a) - Signed by Dion Rudolph MD on 7/24/2024  - Pathologic: No stage assigned - Unsigned       Oncology History   Rectal adenocarcinoma   6/21/2024 Procedure    Colonoscopy - mild diverticular disease, mass in distal rectum (Complete)     6/21/2024 Cancer Staged    Staging form: Colon and Rectum, AJCC 8th Edition  - Clinical stage from 6/21/2024: Stage NAVID (cT2, cN2b, cM1a)     6/24/2024 Initial Diagnosis    Rectal adenocarcinoma  ERROL  - Colonoscopy 6/21/24: infiltrative, ulcerated, sub-mucosal, fungating and villous bleeding mass found in distal rectum, 6-7cm from the anus proximally.   - Distal rectal mass biopsy 6/21/24: well differentiated adenocarcinoma, negative for LVI and perineural invasion. IHC MSI-H low probability with intact nuclear expressions of MLH1, MSH2, MSH6, and PMS2.        7/15/2024 Imaging Significant Findings    MRI Rectal cancer  Low rectal tumor invading the internal sphincter in close approximation to the mesorectal fascia without definite involvement.  Multiple suspicious mesorectal and superior rectal nodes.     *MR STAGE      T: T1/T2 (tumor confined to rectal wall)     N: N+ (short axis ? 9 mm)     Blayne statement: Multiple suspicious mesorectal and superior rectal nodes.     *MRF: threatened (tumor margin within 1-2 mm of MRF) noting this is a low rectal tumor.  No definite evidence of invasion.     Sphincter Involvement: Yes involvement the internal sphincter     Suspicious Extramesorectal Lymph Nodes: No     EMVI: No     7/16/2024 Tumor Markers    Patient's tumor was tested for the following markers: CEA                                              Results of the tumor marker test revealed 2.9     7/17/2024 Imaging Significant Findings    CT CAP  In this patient with a known history of rectal malignancy, there are few borderline prominent lymph nodes adjacent to the rectum measuring up to 8 mm.  There are solid nodules in both lungs, the largest in the lingula measuring up to 1 cm.  Given the patient's primary malignancy, pulmonary metastatic disease cannot be excluded.  Follow-up based upon oncologic guidance.     Prominent soft tissue density in the anterior superior mediastinum which could possibly represent residual thymic tissue with a focal solid structure measuring up to 1.0 x 1.7 cm, possibly an enlarged lymph node versus other soft tissue mass.     Indeterminate focal regions of sclerosis within the T10 vertebra and right sacrum.  Osseous metastatic disease cannot be excluded.     Cholelithiasis without CT evidence for cholecystitis.     Large hiatal hernia.  Diverticulosis coli without diverticulitis.     Large left UPJ stone measuring up to 1.2 cm without hydronephrosis or hydroureter.     7/19/2024 Imaging Significant Findings    PET CT  Hypermetabolic soft tissue thickening along the rectal wall compatible with known malignancy.  There are a few prominent perirectal mesorectal lymph nodes without significant hypermetabolic activity, please note some may be too small for characterization by PET.     0.9 cm lingular  pulmonary nodule with mild hypermetabolic activity and prominent hypermetabolic mediastinal lymph node, concerning for metastatic foci.     8/12/2024 -  Chemotherapy    Treatment Summary   Plan Name: OP GI mFOLFOX6 (oxaliplatin leucovorin fluorouracil) with bevacizumab Q2W  Treatment Goal: Palliative  Status: Active  Start Date: 8/12/2024  End Date: 8/28/2025 (Planned)  Provider: Dion Rudolph MD  Chemotherapy: oxaliplatin (ELOXATIN) 85 mg/m2 = 194 mg in D5W 603.8 mL chemo infusion, 85 mg/m2 = 194 mg, Intravenous, Clinic/HOD 1 time, 4 of 4 cycles  Administration: 194 mg (8/12/2024), 194 mg (8/28/2024), 194 mg (9/12/2024), 194 mg (9/25/2024)  fluorouracil (Adrucil) 2,400 mg/m2 = 5,470 mg in 0.9% NaCl 240 mL chemo infusion, 2,400 mg/m2 = 5,470 mg, Intravenous, Clinic/HOD 1 time, 10 of 24 cycles  Administration: 5,470 mg (8/12/2024), 5,470 mg (8/28/2024), 5,470 mg (9/12/2024), 5,470 mg (9/25/2024), 5,470 mg (10/23/2024), 5,470 mg (11/6/2024), 5,470 mg (11/20/2024), 5,470 mg (12/4/2024), 5,470 mg (1/28/2025), 5,470 mg (2/11/2025)  bevacizumab-awwb (MVASI) 500 mg in 0.9% NaCl 100 mL infusion, 550 mg, Intravenous, Clinic/HOD 1 time, 9 of 23 cycles  Administration: 500 mg (8/12/2024), 500 mg (8/28/2024), 500 mg (9/12/2024), 500 mg (9/25/2024), 500 mg (10/23/2024), 500 mg (11/6/2024), 500 mg (11/20/2024), 500 mg (1/28/2025), 500 mg (2/11/2025)     12/18/2024 - 12/24/2024 Radiation Therapy    Treating physician: Kevin Lopez  Course: C1 Pelvis 2024  Treatment Site Ref. ID Energy Dose/Fx (Gy) #Fx Dose Correction (Gy) Total Dose (Gy) Start Date End Date Elapsed Days   IM Rectum PTV_High 6X 5 5 / 5 0 25 12/18/2024 12/24/2024 6           Interval History:   Ms. Segura returns for follow up prior to cycle 11 of restarting maintenance 5-FU + cherelle after completing radiation on 12/24/24. Feeling okay overall. No nausea, vomiting or constipation after the first 1-3 days of chemotherapy. Reports increased pain with bowel  movements over the weekend. Stools are normal, no constipation currently. No changes since pain started. No overt bleeding. Started taking a probiotic again yesterday. PET rescheduled for 3/11/25.     Patient presents to virtual visit alone. ECOG PS is 0.    ROS:   Review of Systems   Constitutional:  Positive for malaise/fatigue. Negative for chills, fever and weight loss.   HENT:  Negative for hearing loss.    Eyes:  Negative for blurred vision.   Respiratory:  Negative for cough, hemoptysis and shortness of breath.    Cardiovascular:  Negative for chest pain.   Gastrointestinal:  Negative for abdominal pain, blood in stool, constipation, diarrhea, nausea and vomiting.        +rectal pain after defecation   Genitourinary:  Negative for dysuria, frequency and urgency.   Musculoskeletal:  Negative for back pain and myalgias.   Skin:  Negative for rash.   Neurological:  Negative for dizziness, tingling, weakness and headaches.   Endo/Heme/Allergies:  Does not bruise/bleed easily.   Psychiatric/Behavioral:  Negative for depression. The patient is not nervous/anxious.      Past Medical History:   Past Medical History:   Diagnosis Date    Anxiety disorder, unspecified     Fibroids     HTN (hypertension)     Sinus problem         Past Surgical History:   Past Surgical History:   Procedure Laterality Date    BILATERAL SALPINGO-OOPHORECTOMY (BSO) Bilateral 12/9/2020    Procedure: SALPINGO-OOPHORECTOMY, BILATERAL;  Surgeon: Yun Recio MD;  Location: [facility];  Service: OB/GYN;  Laterality: Bilateral;  COVID NEGATIVE 12/3    INCISION OF UVULA      INSERTION OF TUNNELED CENTRAL VENOUS CATHETER (CVC) WITH SUBCUTANEOUS PORT Right 7/29/2024    Procedure: INSERTION, SINGLE LUMEN CATHETER WITH PORT, WITH FLUOROSCOPIC GUIDANCE;  Surgeon: Russel Layton MD;  Location: [address];  Service: General;  Laterality: Right;    knee arhroscopy      NASAL SEPTUM SURGERY      SINUS SURGERY      TONSILLECTOMY      TOTAL  "ABDOMINAL HYSTERECTOMY N/A 12/9/2020    Procedure: HYSTERECTOMY, TOTAL, ABDOMINAL;  Surgeon: Yun Recio MD;  Location: [facility] OR;  Service: OB/GYN;  Laterality: N/A;    UTERINE FIBROID SURGERY          Family History:   Family History   Problem Relation Name Age of Onset    Asthma Mother      Hypertension Mother      COPD Father      Diabetes Father      Hypertension Father      Esophageal cancer Maternal Uncle Jossue     Colon polyps Maternal Grandfather          "every colonoscopy"    Bone cancer Paternal Grandmother      Kidney cancer Maternal Aunt Sherlyn 59        kidney removal    Skin cancer Maternal Uncle Tim         possibly melanoma  recurrent    Throat cancer Maternal Uncle Medardo         late 20s - early 30s    Lung cancer Maternal Uncle Maksim         squamous cell  smoke exposure    Leukemia Maternal Uncle Kendall     Colon cancer Neg Hx          Social History:   Social History     Tobacco Use    Smoking status: Never    Smokeless tobacco: Never   Substance Use Topics    Alcohol use: Yes     Comment: Socially        I have reviewed and updated the patient's past medical, surgical, family and social histories.    Allergies:   Review of patient's allergies indicates:  No Known Allergies     Medications:   Current Outpatient Medications   Medication Sig Dispense Refill    cetirizine (ZYRTEC) 10 MG tablet Take 10 mg by mouth daily as needed.       cholecalciferol, vitamin D3, 5,000 unit Tab Take 5,000 Units by mouth once daily.      citalopram (CELEXA) 20 MG tablet Take 20 mg by mouth every morning.      EVAMIST 1.53 mg/spray (1.7%) transdermal spray SPRAY 2 SPRAYS TO FOREARM DAILY      fluticasone (FLONASE) 50 mcg/actuation nasal spray 1 spray (50 mcg total) by Each Nare route once daily. (Patient not taking: Reported on 9/10/2024) 16 g 0    hydroCHLOROthiazide (HYDRODIURIL) 25 MG tablet Take 25 mg by mouth every morning.      LIDOcaine-prilocaine (EMLA) cream Apply topically as needed. 30 g 3    " LORazepam (ATIVAN) 0.5 MG tablet Take 1 tablet (0.5 mg total) by mouth every 12 (twelve) hours as needed for Anxiety. 5 tablet 0    niacin 50 MG Tab niacin      niacin 50 MG Tab       ondansetron (ZOFRAN-ODT) 8 MG TbDL Take 1 tablet (8 mg total) by mouth every 6 (six) hours as needed (chemotherapy induced nausea/vomiting). 60 tablet 4    oxyCODONE (ROXICODONE) 10 mg Tab immediate release tablet Take 1 tablet (10 mg total) by mouth every 4 (four) hours as needed. 180 tablet 0    phenazopyridine (PYRIDIUM) 200 MG tablet Take 1 tablet (200 mg total) by mouth 3 (three) times daily with meals. 30 tablet 2    rosuvastatin (CRESTOR) 10 MG tablet Take by mouth every evening.       No current facility-administered medications for this visit.      Physical Exam:   [facility] 11/18/2020          Physical Exam  Constitutional:       General: She is not in acute distress.     Appearance: Normal appearance. She is well-developed and normal weight. She is not ill-appearing, toxic-appearing or diaphoretic.   HENT:      Head: Normocephalic and atraumatic.      Right Ear: External ear normal.      Left Ear: External ear normal.   Eyes:      General: No scleral icterus.     Conjunctiva/sclera: Conjunctivae normal.   Pulmonary:      Effort: Pulmonary effort is normal. No respiratory distress.   Neurological:      Mental Status: She is alert and oriented to person, place, and time.      Gait: Gait is intact.   Psychiatric:         Mood and Affect: Mood normal.         Behavior: Behavior normal.         Labs:   Recent Results (from the past 48 hours)   CBC Oncology    Collection Time: 02/24/25  2:00 PM   Result Value Ref Range    WBC 6.93 3.90 - 12.70 K/uL    RBC 4.34 4.00 - 5.40 M/uL    Hemoglobin 12.5 12.0 - 16.0 g/dL    Hematocrit 38.4 37.0 - 48.5 %    MCV 89 82 - 98 fL    MCH 28.8 27.0 - 31.0 pg    MCHC 32.6 32.0 - 36.0 g/dL    RDW 15.1 (H) 11.5 - 14.5 %    Platelets 219 150 - 450 K/uL    MPV 10.6 9.2 - 12.9 fL    Gran # (ANC) 5.1 1.8 - 7.7  K/uL    Immature Grans (Abs) 0.03 0.00 - 0.04 K/uL   Comprehensive Metabolic Panel    Collection Time: 02/24/25  2:00 PM   Result Value Ref Range    Sodium 139 136 - 145 mmol/L    Potassium 3.9 3.5 - 5.1 mmol/L    Chloride 99 95 - 110 mmol/L    CO2 31 (H) 23 - 29 mmol/L    Glucose 131 (H) 70 - 110 mg/dL    BUN 13 6 - 20 mg/dL    Creatinine 0.8 0.5 - 1.4 mg/dL    Calcium 9.5 8.7 - 10.5 mg/dL    Total Protein 7.4 6.0 - 8.4 g/dL    Albumin 3.7 3.5 - 5.2 g/dL    Total Bilirubin 0.3 0.1 - 1.0 mg/dL    Alkaline Phosphatase 107 40 - 150 U/L    AST 20 10 - 40 U/L    ALT 24 10 - 44 U/L    eGFR >60 >60 mL/min/1.73 m^2    Anion Gap 9 8 - 16 mmol/L     Imaging:      CT CAP - 1/24/25:    Impression:     No detrimental change.  Unchanged pulmonary nodules and anterior mediastinal nodule.  Continued attention on follow-up recommended     Cholelithiasis.    Path:   Distal rectal mass biopsy 6/21/24: well differentiated adenocarcinoma, negative for LVI and perineural invasion. IHC MSI-H low probability with intact nuclear expressions of MLH1, MSH2, MSH6, and PMS2.       Assessment:       1. Rectal cancer    2. Malignant neoplasm metastatic to left lung    3. 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 Microcytic anemia    6. Nephrolithiasis    7. Generalized anxiety disorder    8. Hypertension, unspecified type    9. Hyperlipidemia, unspecified hyperlipidemia type    10. Allergic rhinitis, unspecified seasonality, unspecified trigger    11. Irritable bowel syndrome with constipation    12. Cystitis    13. Neoplasm related pain       Plan:         # Rectal adenocarcinoma  Imelda Kimble is a 49 y.o. woman with distal rectal G1 adenocarcinoma pMMR, 5.6 cm long, below the anterior peritoneal reflection, MRF threatened, with involvement the internal sphincter, EMVI-, with multiple (8+) small suspicious mesorectal and superior rectal nodes.     She was in her usual health and went to PCP  for annual screening in 2024. PCP recommended screening colonoscopy as she is >45. 2 weeks prior to her screening colonoscopy, she noticed hematochezia and rectal pain. Her only symptom prior to this was constipation, which is lifelong. Found to have distal rectal tumor as above.    PET/CT 7/19/24 showed hypermetabolism of the primary rectal tumor with small mesorectal lymph nodes.  Additional 9mm L lung nodule and mediastinal lymph node were hypermetabolic.    We discussed her at tumor board with recommendations for induction chemotherapy and then potential local therapy (chemoradiation) to rectum and SBRT/surgery to lung.    Had port placed on 7/29/24    Received cycle 1 FOLFOX + Avastin on 8/12/24, then moved to maintenance 5-FU plus avastin. Last dose of Oxaliplatin was given on 9/25/2024  Repeat imaging after cycle 4 shows good response in mesorectal lymph nodes and L lingular lung metastasis.  Had restaging MRI of pelvis rectum that displays very good response to treatment.   Received 8 cycles of chemo, 5-FU only with last cycle.     Completed short course radiation on 12/24/24.   CT CAP 1/24/25 shows stable disease.    Discussed options of taking treatment break given low volume burden vs restarting maintenance 5-FU + cherelle vs chemo + consideration of consolidative local therapies to lung met and anterior mediastinal lymph node. Will discuss these options further after the review of the PET/CT.     Returns prior to cycle 11.   Labs reviewed, adequate for chemo.   Proceed with cycle 11 - maintenance 5-FU + bevacizumab tomorrow as scheduled.   Plan for MRI and PET in ~2 weeks. Will discuss need for colonoscopy after review of scan results.     Will discuss further care options with Dr. Menendez + Dr. Lopez.    RTC in 2 weeks with lab work, PET/CT and treatment discussion with Dr Menendez, Dr Rudolph and Dr Lopez, +/- chemo.   Pt would like to continue treatment in [place].     # Microcytic anemia,  nephrolithiasis, UTI  Suspect MICHELLE. Counts normalized today. Monitor.    Doing better. Ureteral stent was removed on 10/28/2024.     UTI resolved.     Other chronic disease managed by PCP   # Generalized anxiety disorder - continue citalopram   # HTN - HCTZ   # HLD - statin  # allergic rhinitis - zyrtec and flonase, stable   # IBS-Constipation - Stable    Follow up: 2 weeks with labs, scans and chemo    Patient is in agreement with the proposed treatment plan. All questions were answered to the patient's satisfaction. Pt knows to call clinic if anything is needed before the next clinic visit.    Patient discussed with collaborating physician, Dr. Rudolph.    At least 40 minutes were spent today on this encounter including face to face time with the patient, data gathering/interpretation and documentation.       Eliane Pozo, MSN, APRN, [facility]  Hematology and Medical Oncology  Clinical Nurse Specialist to Dr. Rudolph, Dr. Vázquez & Dr. Holly         code applied: patient requires or will require a continuous, longitudinal, and active collaborative plan of care related to this patient's health condition, metastatic rectal cancer --the management of which requires the direction of a practitioner with specialized clinical knowledge, skill, and expertise.     Route Chart for Scheduling    Med Onc Chart Routing      Follow up with physician . Schedule MRI rectal protocol first available. needs chemo at [place] after next clinic visits.   Follow up with [facility]    Infusion scheduling note   chemo every 2 weeks in [place], pump d/c on day 3   Injection scheduling note    Labs CBC, CMP, CEA and urinalysis   Scheduling:  Preferred lab:  Lab interval: every 2 weeks  at [place] prior to infusion   Imaging   PET scan as scheduled. Needs MRI rectal first available please.   Pharmacy appointment    Other referrals              Treatment Plan Information   OP GI mFOLFOX6 (oxaliplatin leucovorin fluorouracil) with bevacizumab Q2W  Dion Rudolph MD   Associated diagnosis: Rectal adenocarcinoma Stage NAVID cT2, cN2b, cM1a noted on 7/15/2024   Line of treatment: First Line  Treatment Goal: Palliative     Upcoming Treatment Dates - OP GI mFOLFOX6 (oxaliplatin leucovorin fluorouracil) with bevacizumab Q2W    2/25/2025       Chemotherapy       bevacizumab-awwb (MVASI) 5 mg/kg = 550 mg in 0.9% NaCl 100 mL infusion       fluorouracil (Adrucil) 2,400 mg/m2 = 5,470 mg in 0.9% NaCl 240 mL chemo infusion       Antiemetics       palonosetron 0.25mg/dexAMETHasone 12mg in NS IVPB 0.25 mg 50 mL  3/11/2025       Chemotherapy       bevacizumab-awwb (MVASI) 5 mg/kg = 550 mg in 0.9% NaCl 100 mL infusion       fluorouracil (Adrucil) 2,400 mg/m2 = 5,470 mg in 0.9% NaCl 240 mL chemo infusion       Antiemetics       palonosetron 0.25mg/dexAMETHasone 12mg in NS IVPB 0.25 mg 50 mL  3/25/2025       Chemotherapy       bevacizumab-awwb (MVASI) 5 mg/kg = 550 mg in 0.9% NaCl 100 mL infusion       fluorouracil (Adrucil) 2,400 mg/m2 = 5,470 mg in 0.9% NaCl 240 mL chemo infusion       Antiemetics       palonosetron 0.25mg/dexAMETHasone 12mg in NS IVPB 0.25 mg 50 mL  4/8/2025       Chemotherapy       bevacizumab-awwb (MVASI) 5 mg/kg = 550 mg in 0.9% NaCl 100 mL infusion       fluorouracil (Adrucil) 2,400 mg/m2 = 5,470 mg in 0.9% NaCl 240 mL chemo infusion       Antiemetics       palonosetron 0.25mg/dexAMETHasone 12mg in NS IVPB 0.25 mg 50 mL

## 2025-02-25 LAB — CEA SERPL-MCNC: 2 NG/ML (ref 0–5)

## 2025-02-27 ENCOUNTER — TELEPHONE (OUTPATIENT)
Dept: HEMATOLOGY/ONCOLOGY | Facility: CLINIC | Age: 50
End: 2025-02-27
Payer: COMMERCIAL

## 2025-03-06 ENCOUNTER — HOSPITAL ENCOUNTER (OUTPATIENT)
Dept: RADIOLOGY | Facility: HOSPITAL | Age: 50
Discharge: HOME | End: 2025-03-06
Attending: REGISTERED NURSE
Payer: COMMERCIAL

## 2025-03-06 DIAGNOSIS — C78.02 MALIGNANT NEOPLASM METASTATIC TO LEFT LUNG: ICD-10-CM

## 2025-03-06 DIAGNOSIS — C20 RECTAL CANCER: ICD-10-CM

## 2025-03-06 PROCEDURE — 72195 MRI PELVIS W/O DYE: CPT | Mod: 26,,, | Performed by: INTERNAL MEDICINE

## 2025-03-06 PROCEDURE — 72195 MRI PELVIS W/O DYE: CPT | Mod: TC

## 2025-03-10 ENCOUNTER — RESULTS FOLLOW-UP (OUTPATIENT)
Dept: HEMATOLOGY/ONCOLOGY | Facility: CLINIC | Age: 50
End: 2025-03-10

## 2025-03-10 ENCOUNTER — LAB VISIT (OUTPATIENT)
Dept: LAB | Facility: HOSPITAL | Age: 50
End: 2025-03-10
Attending: PHYSICIAN ASSISTANT
Payer: COMMERCIAL

## 2025-03-10 DIAGNOSIS — C20 RECTAL CANCER: ICD-10-CM

## 2025-03-10 DIAGNOSIS — C20 RECTAL ADENOCARCINOMA: ICD-10-CM

## 2025-03-10 LAB
ALBUMIN SERPL BCP-MCNC: 3.6 G/DL (ref 3.5–5.2)
ALP SERPL-CCNC: 142 U/L (ref 40–150)
ALT SERPL W/O P-5'-P-CCNC: 18 U/L (ref 10–44)
ANION GAP SERPL CALC-SCNC: 12 MMOL/L (ref 8–16)
AST SERPL-CCNC: 32 U/L (ref 10–40)
BACTERIA #/AREA URNS HPF: NORMAL /HPF
BASOPHILS # BLD AUTO: 0.04 K/UL (ref 0–0.2)
BASOPHILS NFR BLD: 0.4 % (ref 0–1.9)
BILIRUB SERPL-MCNC: 0.4 MG/DL (ref 0.1–1)
BILIRUB UR QL STRIP: ABNORMAL
BUN SERPL-MCNC: 16 MG/DL (ref 6–20)
CALCIUM SERPL-MCNC: 9.1 MG/DL (ref 8.7–10.5)
CEA SERPL-MCNC: 1.7 NG/ML (ref 0–5)
CHLORIDE SERPL-SCNC: 96 MMOL/L (ref 95–110)
CLARITY UR: CLEAR
CO2 SERPL-SCNC: 28 MMOL/L (ref 23–29)
COLOR UR: ABNORMAL
CREAT SERPL-MCNC: 1.2 MG/DL (ref 0.5–1.4)
DIFFERENTIAL METHOD BLD: ABNORMAL
EOSINOPHIL # BLD AUTO: 0.2 K/UL (ref 0–0.5)
EOSINOPHIL NFR BLD: 1.5 % (ref 0–8)
ERYTHROCYTE [DISTWIDTH] IN BLOOD BY AUTOMATED COUNT: 14.6 % (ref 11.5–14.5)
EST. GFR  (NO RACE VARIABLE): 55 ML/MIN/1.73 M^2
GLUCOSE SERPL-MCNC: 112 MG/DL (ref 70–110)
GLUCOSE UR QL STRIP: ABNORMAL
HCT VFR BLD AUTO: 37.6 % (ref 37–48.5)
HGB BLD-MCNC: 12.8 G/DL (ref 12–16)
HGB UR QL STRIP: NEGATIVE
HYALINE CASTS #/AREA URNS LPF: 1 /LPF
IMM GRANULOCYTES # BLD AUTO: 0.02 K/UL (ref 0–0.04)
IMM GRANULOCYTES NFR BLD AUTO: 0.2 % (ref 0–0.5)
KETONES UR QL STRIP: ABNORMAL
LEUKOCYTE ESTERASE UR QL STRIP: ABNORMAL
LYMPHOCYTES # BLD AUTO: 0.8 K/UL (ref 1–4.8)
LYMPHOCYTES NFR BLD: 8.3 % (ref 18–48)
MCH RBC QN AUTO: 29 PG (ref 27–31)
MCHC RBC AUTO-ENTMCNC: 34 G/DL (ref 32–36)
MCV RBC AUTO: 85 FL (ref 82–98)
MICROSCOPIC COMMENT: NORMAL
MONOCYTES # BLD AUTO: 1 K/UL (ref 0.3–1)
MONOCYTES NFR BLD: 9.9 % (ref 4–15)
NEUTROPHILS # BLD AUTO: 7.8 K/UL (ref 1.8–7.7)
NEUTROPHILS NFR BLD: 79.7 % (ref 38–73)
NITRITE UR QL STRIP: POSITIVE
NRBC BLD-RTO: 0 /100 WBC
PH UR STRIP: 5 [PH] (ref 5–8)
PLATELET # BLD AUTO: 222 K/UL (ref 150–450)
PMV BLD AUTO: 10.7 FL (ref 9.2–12.9)
POTASSIUM SERPL-SCNC: 3.3 MMOL/L (ref 3.5–5.1)
PROT SERPL-MCNC: 7.6 G/DL (ref 6–8.4)
PROT UR QL STRIP: ABNORMAL
RBC # BLD AUTO: 4.41 M/UL (ref 4–5.4)
RBC #/AREA URNS HPF: 0 /HPF (ref 0–4)
SODIUM SERPL-SCNC: 136 MMOL/L (ref 136–145)
SP GR UR STRIP: <=1.005 (ref 1–1.03)
SQUAMOUS #/AREA URNS HPF: 5 /HPF
URN SPEC COLLECT METH UR: ABNORMAL
UROBILINOGEN UR STRIP-ACNC: ABNORMAL EU/DL
WBC # BLD AUTO: 9.79 K/UL (ref 3.9–12.7)
WBC #/AREA URNS HPF: 4 /HPF (ref 0–5)

## 2025-03-10 PROCEDURE — 82378 CARCINOEMBRYONIC ANTIGEN: CPT | Performed by: PHYSICIAN ASSISTANT

## 2025-03-10 PROCEDURE — 80053 COMPREHEN METABOLIC PANEL: CPT | Performed by: PHYSICIAN ASSISTANT

## 2025-03-10 PROCEDURE — 81000 URINALYSIS NONAUTO W/SCOPE: CPT | Performed by: INTERNAL MEDICINE

## 2025-03-10 PROCEDURE — 36415 COLL VENOUS BLD VENIPUNCTURE: CPT | Performed by: PHYSICIAN ASSISTANT

## 2025-03-10 PROCEDURE — 85025 COMPLETE CBC W/AUTO DIFF WBC: CPT | Performed by: PHYSICIAN ASSISTANT

## 2025-03-11 ENCOUNTER — HOSPITAL ENCOUNTER (OUTPATIENT)
Dept: RADIOLOGY | Facility: HOSPITAL | Age: 50
Discharge: HOME | End: 2025-03-11
Attending: INTERNAL MEDICINE
Payer: COMMERCIAL

## 2025-03-11 DIAGNOSIS — C20 RECTAL CANCER: ICD-10-CM

## 2025-03-11 LAB — POCT GLUCOSE: 103 MG/DL (ref 70–110)

## 2025-03-11 PROCEDURE — 78815 PET IMAGE W/CT SKULL-THIGH: CPT | Mod: TC

## 2025-03-11 PROCEDURE — 78815 PET IMAGE W/CT SKULL-THIGH: CPT | Mod: 26,PS,, | Performed by: NUCLEAR MEDICINE

## 2025-03-11 PROCEDURE — A9552 F18 FDG: HCPCS | Performed by: INTERNAL MEDICINE

## 2025-03-11 RX ORDER — FLUDEOXYGLUCOSE F18 500 MCI/ML
13.81 INJECTION INTRAVENOUS
Status: COMPLETED | OUTPATIENT
Start: 2025-03-11 | End: 2025-03-11

## 2025-03-11 RX ADMIN — FLUDEOXYGLUCOSE F-18 13.81 MILLICURIE: 500 INJECTION INTRAVENOUS at 08:03

## 2025-03-12 ENCOUNTER — PATIENT MESSAGE (OUTPATIENT)
Dept: HEMATOLOGY/ONCOLOGY | Facility: CLINIC | Age: 50
End: 2025-03-12
Payer: COMMERCIAL

## 2025-03-13 ENCOUNTER — PATIENT MESSAGE (OUTPATIENT)
Dept: HEMATOLOGY/ONCOLOGY | Facility: CLINIC | Age: 50
End: 2025-03-13
Payer: COMMERCIAL

## 2025-03-17 ENCOUNTER — PATIENT MESSAGE (OUTPATIENT)
Dept: HEMATOLOGY/ONCOLOGY | Facility: CLINIC | Age: 50
End: 2025-03-17
Payer: COMMERCIAL

## 2025-03-20 ENCOUNTER — TELEPHONE (OUTPATIENT)
Dept: HEMATOLOGY/ONCOLOGY | Facility: CLINIC | Age: 50
End: 2025-03-20
Payer: COMMERCIAL

## 2025-03-21 ENCOUNTER — OFFICE VISIT (OUTPATIENT)
Dept: RADIATION ONCOLOGY | Facility: CLINIC | Age: 50
End: 2025-03-21
Payer: COMMERCIAL

## 2025-03-21 DIAGNOSIS — C20 RECTAL ADENOCARCINOMA: Primary | ICD-10-CM

## 2025-03-21 NOTE — Clinical Note
Michael Will, I had a virtual visit with her but she has all the symptoms of an anal fissure.  Very intense pain with bowel movements that persists afterwards.  I gave her recommendations as written my note but if you have anything to add that would be great, thanks Kevin

## 2025-03-21 NOTE — PROGRESS NOTES
[facility] - Radiation Oncology follow-up Note           Date of Service: 03/21/2025     Chief Complaint: rectal cancer, status post induction chemo and short course radiation     Reason for visit:  Consideration for radiation to the thorax     Referring Physician: Dr Menendez ([facility])     Implantable devices: denies     Therapy to Date:  Course: C1 Pelvis 2024  Treatment Site Ref. ID Energy Dose/Fx (Gy) #Fx Dose Correction (Gy) Total Dose (Gy) Start Date End Date Elapsed Days   IM Rectum PTV_High 6X 5 5 / 5 0 25 12/18/2024 12/24/2024 6         Diagnosis/Assessment:   Imelda Kimble is a 49 y.o. woman with Stage NAVID (cT2, cN2b, cM1a) distal G1 adenocarcinoma pMMR, 5.6 cm long, below the anterior peritoneal reflection, MRF threatened, with involvement the internal sphincter, EMVI-, with multiple (8+) small suspicious mesorectal and superior rectal nodes. PET scan showed evidence of metastatic pulmonary disease  PMH hysterectomy/BSO, anxiety, HTN, Left knee replacement   She received mFOLFOX with bevacizumab Q2W from 08/12/2024 - 12/04/2024 with Dr Rudolph   Discussed at Colorectal surgery tumor board plan for short course radiation  She is s/p short course radiation therapy 25 Gy in 5 daily fractions with vaginal dilator use, given Monday through Friday using IMRT to decrease small bowel and bladder dose completed 12/24/2024.  Received systemic therapy 8/12/2024 - 2/25/2025 mFOLFOX6 with bevacizumab Q2W with Dr Rudolph     Her latest PETCT of the lung shows 2 persistent lung nodules unchanged in size compared to prior (1 prevascular node, 1 lingular)      Patient is asymptomatic from her chest lesions.  Anorectal pain most likely caused by a fissure  ECOG 0         Plan      After discussing with Dr. Rudolph, the patient has persistent disease after completing all her systemic therapy, at 1 prevascular lymph node and a micronodule in the left lingula.    We recommend hypofractionated radiation in 8  to 15 fractions given daily Monday through Friday using VMAT for local control.    Risks benefits and alternatives were discussed  Side effects include but are not limited to pneumonitis, pericarditis, cough, or fatigue    I reviewed the rationale and goal of the proposed treatment course. The radiotherapeutic procedure with associated side effects and potential complications were explained. They had the opportunity to ask questions which were answered to the best of my knowledge.   The patient voiced understanding of the above and has elected to proceed with treatment.   RT consent form was signed.   They were also given our contact information should further questions or concerns arise.     - CT simulation scan 4D CT Tuesday 03/25/2025  - RT consent signed  - continue to follow with Dr. Rudolph  - recommended follow up with Dr. Menendez:  Sitz bath, recticare, stool softeners, strong hydration      Interval history:      07/16/2024 initial radiation oncology evaluation - after finding out patient had stage IV disease to lungs  plan was changed to induction chemotherapy        07/19/2024 PET CT scan  0.9 pulmonary nodule with mild hypermetabolic activity in prominent hypermetabolic mediastinal lymph node concerning for metastasis          08/12/2024 - 12/04/2024 Dr Rudolph gave  mFOLFOX with bevacizumab Q2W (last dose of bevacizumab 11/20/2024).     10/21/2024 CT CAP              Perirectal lymph nodes significantly decreased in size     10/31/2024 MRI rectum - motion degraded              Treatment response from 5.6 cm long down to 2.5 cm long primary tumor, no visible regional lymph nodes        11/13/2024 Colorectal surgery tumor board              Plan for a short course radiation therapy  12/02/2024 CEA 2.5   12/24/2024 Tolerated radiation therapy well with that expected acute toxicities  Return to radiation oncology clinic in 4-6 weeks     1/27/2025 Medical Oncology Dr. Rudolph              c/w mFOLFOX6  (oxaliplatin leucovorin fluorouracil) with bevacizumab Q2W   Discussed options of taking treatment break given low volume burden vs restarting maintenance 5-FU + cherelle vs chemo + consideration of consolidative local therapies to lung met and anterior mediastinal lymph node.         01/24/2025 CT chest abdomen and pelvis              Solid 5 mm left upper lobe nodule unchanged in size compared to prior              Solid 3 mm right middle lobe nodule unchanged in size compared to prior                8/12/2024 - 2/25/2025 mFOLFOX6 with bevacizumab Q2W       03/06/2025 MRI rectum   1 cm long distal rectal tumor decreased in size compared to prior MRI of October 2024, no longer showing sphincter invasion, no evidence of lymphadenopathy    03/11/2025 PET FDG   Decreased soft tissue thickening and tracer avidity of left rectal wall SUV max 9.4   Decreased tracer uptake of lingular pulmonary nodule 0.9 cm SUV max similar to background   Decreased tracer uptake of prevascular lymph node 1.0 cm SUV max 3.1   No new hypermetabolic tumor in the chest              FDG PET-CT 07/19/2024                  FDG PET-CT 03/21/2025                  3/10/2025 CEA 1.7            Subjective:   During the virtual visit, the patient reports that a few weeks ago she started to have some intense tenesmus.  It began when she started to develop bulky stools and since after each BM she feels intense sharp pain that radiates down her legs.  She has been taking opioid medication but it is causing her more constipation and more pain with defecation.  She has been hydrating well.  She denies any vaginal issues such as bleeding or discharge  She recently started to experience more dysuria.  She recently completed a course of antibiotics for UTI  She denies any pulmonary issues such as cough chest pain hemoptysis shortness of breath or dyspnea on exertion  She denies other major issues     Social history  From [place],   at [employer] at  [facility] in [place], [place].  ([place] [employer] [place] )     Medications Ordered Prior to Encounter[1]    Review of patient's allergies indicates:  No Known Allergies            Review of Systems   Negative unless as above         HPI:   Imelda Kimble is a 48 y.o. woman with recent diagnosis of rectal cancer after presenting with screening colonoscopy     Oncologic history:  06/21/24 colonoscopy (Dr Rasheed)              Mild diverticulosis              Infiltrative ulcerated submucosal fungating and villous bleeding mass in distal rectum, spanning from anal verge to 6-7cm proximally              Pathology: G1 adenocarcinoma pMMR, without PNI or LVI     7/15/2024 MRI rectum  5.6 cm long low rectal lesion, below the anterior peritoneal reflection, MRF threatened, with involvement the internal sphincter, EMVI-, T1/T2, with multiple (8+) small suspicious mesorectal and superior rectal nodes.                    Objective:   Physical Exam  Vitals reviewed.     Constitutional:       Appearance: Normal appearance.   HENT:      Head: Normocephalic and atraumatic.   Eyes:      Conjunctiva/sclera: Conjunctivae normal.   Cardiovascular:      Comments: extremities well perfused  Pulmonary:      Effort: Pulmonary effort is normal.   Abdominal:      General: There is no distension.   Genitourinary:     Comments: KODY deferred, s/p SC RT  Musculoskeletal:         General: Normal range of motion.   Neurological:      General: No focal deficit present.      Mental Status: alert and oriented  Psychiatric:         Mood and Affect: Mood normal.         Behavior: Behavior normal.      Imaging: I have personally reviewed the patient's available images and reports and summarized pertinent findings above in HPI.      Laboratory: I have personally reviewed the patient's available laboratory values and summarized pertinent findings above in HPI.       I spent approximately 30 minutes reviewing the available records and evaluating the patient, out of which over 50% of the time was spent face to face with the patient in counseling and coordinating this patient's care.     Thank you for the opportunity to care for this patient. Please do not hesitate to contact me with any questions.     Kevin Lopez MD/PhD           [1]   Current Outpatient Medications on File Prior to Visit   Medication Sig Dispense Refill    cetirizine (ZYRTEC) 10 MG tablet Take 10 mg by mouth daily as needed.       cholecalciferol, vitamin D3, 5,000 unit Tab Take 5,000 Units by mouth once daily.      citalopram (CELEXA) 20 MG tablet Take 20 mg by mouth every morning.      EVAMIST 1.53 mg/spray (1.7%) transdermal spray SPRAY 2 SPRAYS TO FOREARM DAILY      fluticasone (FLONASE) 50 mcg/actuation nasal spray 1 spray (50 mcg total) by Each Nare route once daily. (Patient not taking: Reported on 9/10/2024) 16 g 0    hydroCHLOROthiazide (HYDRODIURIL) 25 MG tablet Take 25 mg by mouth every morning.      LIDOcaine-prilocaine (EMLA) cream Apply topically as needed. 30 g 3    LORazepam (ATIVAN) 0.5 MG tablet Take 1 tablet (0.5 mg total) by mouth every 12 (twelve) hours as needed for Anxiety. 5 tablet 0    niacin 50 MG Tab niacin      niacin 50 MG Tab       ondansetron (ZOFRAN-ODT) 8 MG TbDL Take 1 tablet (8 mg total) by mouth every 6 (six) hours as needed (chemotherapy induced nausea/vomiting). 60 tablet 4    oxyCODONE (ROXICODONE) 10 mg Tab immediate release tablet Take 1 tablet (10 mg total) by mouth every 4 (four) hours as needed. 180 tablet 0    phenazopyridine (PYRIDIUM) 200 MG tablet Take 1 tablet (200 mg total) by mouth 3 (three) times daily with meals. 30 tablet 2    rosuvastatin (CRESTOR) 10 MG tablet Take by mouth every evening.       No current facility-administered medications on file prior to visit.

## 2025-03-24 ENCOUNTER — TELEPHONE (OUTPATIENT)
Dept: SURGERY | Facility: CLINIC | Age: 50
End: 2025-03-24
Payer: COMMERCIAL

## 2025-03-24 DIAGNOSIS — G89.3 NEOPLASM RELATED PAIN: ICD-10-CM

## 2025-03-24 DIAGNOSIS — C20 RECTAL CANCER: ICD-10-CM

## 2025-03-24 RX ORDER — POLYETHYLENE GLYCOL 3350 17 G/17G
17 POWDER, FOR SOLUTION ORAL DAILY
Qty: 510 G | Refills: 2 | Status: SHIPPED | OUTPATIENT
Start: 2025-03-24

## 2025-03-24 NOTE — TELEPHONE ENCOUNTER
Discussed symptoms - about 3 weeks of perianal pain, worse after bowel movements  No fevers or chills  She did start stool softeners - OTC  She is not having soft stools - not working    Will treat for presumed fissure while awaiting in-office exam  E-prescribed Diltiazem and Miralax, stop Recticare  Will try to get her in this week for visit      Sabino Menendez MD, FACS, FASCRS  Department of Colon & Rectal Surgery  [facility]

## 2025-03-25 ENCOUNTER — HOSPITAL ENCOUNTER (OUTPATIENT)
Dept: RADIATION THERAPY | Facility: HOSPITAL | Age: 50
Discharge: HOME | End: 2025-03-25
Payer: COMMERCIAL

## 2025-03-25 ENCOUNTER — OFFICE VISIT (OUTPATIENT)
Dept: SURGERY | Facility: CLINIC | Age: 50
End: 2025-03-25
Payer: COMMERCIAL

## 2025-03-25 VITALS
BODY MASS INDEX: 35.64 KG/M2 | RESPIRATION RATE: 18 BRPM | WEIGHT: 227.06 LBS | DIASTOLIC BLOOD PRESSURE: 76 MMHG | HEIGHT: 67 IN | SYSTOLIC BLOOD PRESSURE: 127 MMHG | HEART RATE: 94 BPM

## 2025-03-25 DIAGNOSIS — C20 RECTAL CANCER: Primary | ICD-10-CM

## 2025-03-25 PROCEDURE — 1159F MED LIST DOCD IN RCRD: CPT | Mod: CPTII,S$GLB,, | Performed by: STUDENT IN AN ORGANIZED HEALTH CARE EDUCATION/TRAINING PROGRAM

## 2025-03-25 PROCEDURE — 3078F DIAST BP <80 MM HG: CPT | Mod: CPTII,S$GLB,, | Performed by: STUDENT IN AN ORGANIZED HEALTH CARE EDUCATION/TRAINING PROGRAM

## 2025-03-25 PROCEDURE — 77014 HC CT GUIDANCE RADIATION THERAPY FLDS PLACEMENT: CPT | Mod: TC | Performed by: STUDENT IN AN ORGANIZED HEALTH CARE EDUCATION/TRAINING PROGRAM

## 2025-03-25 PROCEDURE — 99999 PR PBB SHADOW E&M-EST. PATIENT-LVL IV: CPT | Mod: PBBFAC,,, | Performed by: STUDENT IN AN ORGANIZED HEALTH CARE EDUCATION/TRAINING PROGRAM

## 2025-03-25 PROCEDURE — 77014 PR  CT GUIDANCE PLACEMENT RAD THERAPY FIELDS: CPT | Mod: 26,,, | Performed by: STUDENT IN AN ORGANIZED HEALTH CARE EDUCATION/TRAINING PROGRAM

## 2025-03-25 PROCEDURE — 77263 THER RADIOLOGY TX PLNG CPLX: CPT | Mod: ,,, | Performed by: STUDENT IN AN ORGANIZED HEALTH CARE EDUCATION/TRAINING PROGRAM

## 2025-03-25 PROCEDURE — 3074F SYST BP LT 130 MM HG: CPT | Mod: CPTII,S$GLB,, | Performed by: STUDENT IN AN ORGANIZED HEALTH CARE EDUCATION/TRAINING PROGRAM

## 2025-03-25 PROCEDURE — 3008F BODY MASS INDEX DOCD: CPT | Mod: CPTII,S$GLB,, | Performed by: STUDENT IN AN ORGANIZED HEALTH CARE EDUCATION/TRAINING PROGRAM

## 2025-03-25 PROCEDURE — 99214 OFFICE O/P EST MOD 30 MIN: CPT | Mod: S$GLB,,, | Performed by: STUDENT IN AN ORGANIZED HEALTH CARE EDUCATION/TRAINING PROGRAM

## 2025-03-25 PROCEDURE — 77334 RADIATION TREATMENT AID(S): CPT | Mod: TC | Performed by: STUDENT IN AN ORGANIZED HEALTH CARE EDUCATION/TRAINING PROGRAM

## 2025-03-25 PROCEDURE — 77334 RADIATION TREATMENT AID(S): CPT | Mod: 26,,, | Performed by: STUDENT IN AN ORGANIZED HEALTH CARE EDUCATION/TRAINING PROGRAM

## 2025-03-25 RX ORDER — OXYCODONE HYDROCHLORIDE 10 MG/1
10 TABLET ORAL EVERY 4 HOURS PRN
Qty: 180 TABLET | Refills: 0 | Status: SHIPPED | OUTPATIENT
Start: 2025-03-25

## 2025-03-25 NOTE — PROGRESS NOTES
[facility]  Colon and Rectal Surgery    [address]  [place], [place]  Tel: [phone]  Fax: [phone]  [email or web address]   MD Andrew Mccloud MD Brian Kann, MD W. Forrest Johnston, MD Matthew Giglia, MD Jennifer Paruch, MD William Kethman, MD Danielle Kay, MD     Patient name: Imelda Kimble   YOB: 1975   MRN: [id number]    Dear Dr. Rudolph,    It was a pleasure seeing Ms. Kimble in the Colon and Rectal Surgery clinic here at [facility].     As you know, Ms. Kimble is a 49 year old woman with a history of HTN and anxiety  who presents for evaluation of rectal adenocarcinoma.  She recently underwent a colonoscopy (performed for screening purposes) for rectal bleeding (developed 2-3 weeks before the colonoscopy - this was everyday) and was found to have a distal rectal mass - biopsy proven adenocarcinoma, see history below. She is scheduled for a CEA and CT CAP this week. She has always had issues with constipation and was treated for presumed IBS-C. She was not having or having pain currently. She has had an open hysterectomy. She has had 5 members of her family diagnosed with cancers - but no family history of colon cancer. She is up-to-date with screening mammography. She denies any nausea, vomiting, fevers, chills, chest pain, or shortness of breath. She denies any fecal incontinence.    3/25/2025  Here for evaluation of rectal pain after hard bowel movement - called her yesterday and prescribed Diltiazem and Miralax. She has had quite a significant response to treatment, although with evidence of metastatic disease. The pain has been present for over 1 month - PET CT was performed after the pain had started. She denies fevers or chills. She is having bowel movements every 2-3 days. FOLFOX > chemoradiation (completed 12/24/2024) > FOLFOX > radiation to pulmonary metastasis > options were    Oncology History   Rectal adenocarcinoma    6/21/2024 Procedure    Colonoscopy - mild diverticular disease, mass in distal rectum (Complete)     6/21/2024 Cancer Staged    Staging form: Colon and Rectum, AJCC 8th Edition  - Clinical stage from 6/21/2024: Stage NAVID (cT2, cN2b, cM1a)     6/24/2024 Initial Diagnosis    Rectal adenocarcinoma  ERROL  - Colonoscopy 6/21/24: infiltrative, ulcerated, sub-mucosal, fungating and villous bleeding mass found in distal rectum, 6-7cm from the anus proximally.   - Distal rectal mass biopsy 6/21/24: well differentiated adenocarcinoma, negative for LVI and perineural invasion. IHC MSI-H low probability with intact nuclear expressions of MLH1, MSH2, MSH6, and PMS2.        7/15/2024 Imaging Significant Findings    MRI Rectal cancer  Low rectal tumor invading the internal sphincter in close approximation to the mesorectal fascia without definite involvement.  Multiple suspicious mesorectal and superior rectal nodes.     *MR STAGE     T: T1/T2 (tumor confined to rectal wall)     N: N+ (short axis ? 9 mm)     Blayne statement: Multiple suspicious mesorectal and superior rectal nodes.     *MRF: threatened (tumor margin within 1-2 mm of MRF) noting this is a low rectal tumor.  No definite evidence of invasion.     Sphincter Involvement: Yes involvement the internal sphincter     Suspicious Extramesorectal Lymph Nodes: No     EMVI: No     7/16/2024 Tumor Markers    Patient's tumor was tested for the following markers: CEA                                              Results of the tumor marker test revealed 2.9     7/17/2024 Imaging Significant Findings    CT CAP  In this patient with a known history of rectal malignancy, there are few borderline prominent lymph nodes adjacent to the rectum measuring up to 8 mm.  There are solid nodules in both lungs, the largest in the lingula measuring up to 1 cm.  Given the patient's primary malignancy, pulmonary metastatic disease cannot be excluded.  Follow-up based upon oncologic guidance.      Prominent soft tissue density in the anterior superior mediastinum which could possibly represent residual thymic tissue with a focal solid structure measuring up to 1.0 x 1.7 cm, possibly an enlarged lymph node versus other soft tissue mass.     Indeterminate focal regions of sclerosis within the T10 vertebra and right sacrum.  Osseous metastatic disease cannot be excluded.     Cholelithiasis without CT evidence for cholecystitis.     Large hiatal hernia.  Diverticulosis coli without diverticulitis.     Large left UPJ stone measuring up to 1.2 cm without hydronephrosis or hydroureter.     7/19/2024 Imaging Significant Findings    PET CT  Hypermetabolic soft tissue thickening along the rectal wall compatible with known malignancy.  There are a few prominent perirectal mesorectal lymph nodes without significant hypermetabolic activity, please note some may be too small for characterization by PET.     0.9 cm lingular pulmonary nodule with mild hypermetabolic activity and prominent hypermetabolic mediastinal lymph node, concerning for metastatic foci.     8/12/2024 -  Chemotherapy    Treatment Summary   Plan Name: OP GI [id number] (oxaliplatin leucovorin fluorouracil) with bevacizumab Q2W  Treatment Goal: Palliative  Status: Active  Start Date: 8/12/2024  End Date: 8/28/2025 (Planned)  Provider: Dion Rudolph MD  Chemotherapy: oxaliplatin (ELOXATIN) 85 mg/m2 = 194 mg in D5W 603.8 mL chemo infusion, 85 mg/m2 = 194 mg, Intravenous, Clinic/[facility] 1 time, 4 of 4 cycles  Administration: 194 mg (8/12/2024), 194 mg (8/28/2024), 194 mg (9/12/2024), 194 mg (9/25/2024)  fluorouracil (Adrucil) 2,400 mg/m2 = 5,470 mg in 0.9% NaCl 240 mL chemo infusion, 2,400 mg/m2 = 5,470 mg, Intravenous, Clinic/[facility] 1 time, 11 of 24 cycles  Administration: 5,470 mg (8/12/2024), 5,470 mg (8/28/2024), 5,470 mg (9/12/2024), 5,470 mg (9/25/2024), 5,470 mg (10/23/2024), 5,470 mg (11/6/2024), 5,470 mg (11/20/2024), 5,470 mg (12/4/2024), 5,470 mg  (1/28/2025), 5,470 mg (2/11/2025), 5,470 mg (2/25/2025)  bevacizumab-awwb (MVASI) 500 mg in 0.9% NaCl 100 mL infusion, 550 mg, Intravenous, Clinic/[facility] 1 time, 10 of 23 cycles  Administration: 500 mg (8/12/2024), 500 mg (8/28/2024), 500 mg (9/12/2024), 500 mg (9/25/2024), 500 mg (10/23/2024), 500 mg (11/6/2024), 500 mg (11/20/2024), 500 mg (1/28/2025), 500 mg (2/11/2025), 500 mg (2/25/2025)     12/18/2024 - 12/24/2024 Radiation Therapy    Treating physician: Kevin Lopez  Course: C1 Pelvis 2024  Treatment Site Ref. ID Energy Dose/Fx (Gy) #Fx Dose Correction (Gy) Total Dose (Gy) Start Date End Date Elapsed Days   IM Rectum PTV_High 6X 5 5 / 5 0 25 12/18/2024 12/24/2024 6        1/24/2025 Imaging Significant Findings    CT CAP  No detrimental change.  Unchanged pulmonary nodules and anterior mediastinal nodule.  Continued attention on follow-up recommended     Cholelithiasis.     3/6/2025 Imaging Significant Findings    MRI Rectal cancer  Compared to 10/31/2024, post treatment primary tumor assessment: Probable Incomplete response (likely residual tumor).     The previous tumor in the low rectum has decreased in size, and no longer demonstrates sphincter invasion.  There is a small focus of questionable diffusion restriction involving the left lateral wall of the low rectum, which may be artifactual but could also represent residual tumor.     *NODES     Suspicious Mesorectal Lymph nodes: No     Suspicious Extramesorectal Lymph nodes: No     3/11/2025 Imaging Significant Findings    PET CT  In this patient with history of rectal adenocarcinoma, decreased focal soft tissue thickening and tracer avidity of the left rectal wall. Additional decreased tracer uptake of the lingular pulmonary nodule and mediastinal/pelvic lymph nodes. Findings suggestive of favorable treatment response. No new hypermetabolic tumor.           The patient was informed of the availability of a certified  without charge. A  "certified  was not necessary for this visit.    Review of Systems  See pertinent review of systems above    Past Medical History:   Diagnosis Date    Anxiety disorder, unspecified     Fibroids     HTN (hypertension)     Sinus problem      Past Surgical History:   Procedure Laterality Date    BILATERAL SALPINGO-OOPHORECTOMY (BSO) Bilateral 12/9/2020    Procedure: SALPINGO-OOPHORECTOMY, BILATERAL;  Surgeon: Yun Recio MD;  Location: [facility] OR;  Service: OB/GYN;  Laterality: Bilateral;  COVID NEGATIVE 12/3    INCISION OF UVULA      INSERTION OF TUNNELED CENTRAL VENOUS CATHETER (CVC) WITH SUBCUTANEOUS PORT Right 7/29/2024    Procedure: INSERTION, SINGLE LUMEN CATHETER WITH PORT, WITH FLUOROSCOPIC GUIDANCE;  Surgeon: Russel Layton MD;  Location: [address];  Service: General;  Laterality: Right;    knee arhroscopy      NASAL SEPTUM SURGERY      SINUS SURGERY      TONSILLECTOMY      TOTAL ABDOMINAL HYSTERECTOMY N/A 12/9/2020    Procedure: HYSTERECTOMY, TOTAL, ABDOMINAL;  Surgeon: Yun Recio MD;  Location: [facility] OR;  Service: OB/GYN;  Laterality: N/A;    UTERINE FIBROID SURGERY       Family History   Problem Relation Name Age of Onset    Asthma Mother      Hypertension Mother      COPD Father      Diabetes Father      Hypertension Father      Esophageal cancer Maternal Uncle Jossue     Colon polyps Maternal Grandfather          "every colonoscopy"    Bone cancer Paternal Grandmother      Kidney cancer Maternal Aunt Sherlyn 59        kidney removal    Skin cancer Maternal Uncle Tim         possibly melanoma  recurrent    Throat cancer Maternal Uncle Medardo         late 20s - early 30s    Lung cancer Maternal Uncle Maksim         squamous cell  smoke exposure    Leukemia Maternal Uncle Kendall     Colon cancer Neg Hx       Social History     Tobacco Use    Smoking status: Never    Smokeless tobacco: Never   Substance Use Topics    Alcohol use: Yes     Comment: Socially    Drug use: " "No     Review of patient's allergies indicates:  No Known Allergies    Current Outpatient Medications on File Prior to Visit   Medication Sig Dispense Refill    cetirizine (ZYRTEC) 10 MG tablet Take 10 mg by mouth daily as needed.       cholecalciferol, vitamin D3, 5,000 unit Tab Take 5,000 Units by mouth once daily.      citalopram (CELEXA) 20 MG tablet Take 20 mg by mouth every morning.      diltiazem 2% rectal cream Use a pea-sized amount.  Apply to the OUTER [place] of your anus topically 3 (three) times daily to anal area..  Do not insert into the anus. discard after 30 days 30 g 3    EVAMIST 1.53 mg/spray (1.7%) transdermal spray SPRAY 2 SPRAYS TO FOREARM DAILY      fluticasone (FLONASE) 50 mcg/actuation nasal spray 1 spray (50 mcg total) by Each Nare route once daily. 16 g 0    hydroCHLOROthiazide (HYDRODIURIL) 25 MG tablet Take 25 mg by mouth every morning.      LIDOcaine-prilocaine (EMLA) cream Apply topically as needed. 30 g 3    LORazepam (ATIVAN) 0.5 MG tablet Take 1 tablet (0.5 mg total) by mouth every 12 (twelve) hours as needed for Anxiety. 5 tablet 0    niacin 50 MG Tab niacin      niacin 50 MG Tab       ondansetron (ZOFRAN-ODT) 8 MG TbDL Take 1 tablet (8 mg total) by mouth every 6 (six) hours as needed (chemotherapy induced nausea/vomiting). 60 tablet 4    oxyCODONE (ROXICODONE) 10 mg Tab immediate release tablet Take 1 tablet (10 mg total) by mouth every 4 (four) hours as needed. 180 tablet 0    phenazopyridine (PYRIDIUM) 200 MG tablet Take 1 tablet (200 mg total) by mouth 3 (three) times daily with meals. 30 tablet 2    polyethylene glycol (GLYCOLAX) 17 gram/dose powder Take 17 g by mouth once daily. 510 g 2    rosuvastatin (CRESTOR) 10 MG tablet Take by mouth every evening.       No current facility-administered medications on file prior to visit.     Physical Examination  /76 (BP Location: Left arm, Patient Position: Sitting)   Pulse 94   Resp 18   Ht 5' 7" (1.702 m)   Wt 103 kg (227 lb " 1.2 oz)   LMP 11/18/2020   BMI 35.56 kg/m²      A chaperone was present for the physical examination.    Constitutional: well developed, no cough, no dyspnea, alert, and no acute distress    Head: Normocephalic, no lesions, without obvious abnormality  Eye: Normal external eye, conjunctiva, and lids  Cardiovascular: regular rate and regular rhythm  Respiratory: normal air entry  Gastrointestinal: soft, non-tender    Perianal Skin: left lateral <1 cm thrombosed hemorrhoid, posterior midline fissure, no evidence of abscess    Musculoskeletal: full range of motion without pain  Neurologic: alert, oriented, normal speech, no focal findings or movement disorder noted  Psychiatric: appropriate, normal mood    Deferred flexible sigmoidoscopy today due to pain     Assessment and Plan of Care    Thank you again for referring Ms. Kimble to my care. In summary, Ms. Kimble is a 49 year old woman presenting with metastatic rectal adenocarcinoma - undergoing chemotherapy and treatment of metastatic disease. We discussed treatment options and have provided the following recommendations:    We had a discussion about management options for anal fissure (or a tear of the anal canal). Anal fissures are caused by trauma or stretching of the anal canal. This is most often due to constipation (having hard, [id number] stools). Severe diarrhea or insertion of an object into the anal canal may also cause a fissure. Anal fissures can heal spontaneously by preventing constipation or further trauma to the anus. Bleeding often improves by reducing hard stools and straining - Fiber supplementation (Metamucil 20-30 grams daily) and hydration (1.5 - 2 L daily) are effective. We have also discussed other options for management including warm soaks to relieve anal spasm/pain, Diltiazem 2% ointment applied three times daily, Botox chemodenervation of the anal sphincter muscle, and lateral internal sphincterotomy. Start with warm soaks and  Diltiazem - she is now taking Miralax as of last night.  Follow-up in 1 month to reassess primary tumor (may need to do this under sedation but hopefull her pain will be better) and discuss next steps  Will discuss with Dr. Rudolph before next visit    Please do not hesitate to contact me if you have any questions.      Sabino Menendez MD, FACS, FASCRS  Department of Colon & Rectal Surgery  [facility]

## 2025-04-01 ENCOUNTER — HOSPITAL ENCOUNTER (OUTPATIENT)
Dept: RADIATION THERAPY | Facility: HOSPITAL | Age: 50
Discharge: HOME | End: 2025-04-01
Attending: STUDENT IN AN ORGANIZED HEALTH CARE EDUCATION/TRAINING PROGRAM
Payer: COMMERCIAL

## 2025-04-10 PROCEDURE — 77301 RADIOTHERAPY DOSE PLAN IMRT: CPT | Mod: TC | Performed by: STUDENT IN AN ORGANIZED HEALTH CARE EDUCATION/TRAINING PROGRAM

## 2025-04-10 PROCEDURE — 77301 RADIOTHERAPY DOSE PLAN IMRT: CPT | Mod: 26,,, | Performed by: STUDENT IN AN ORGANIZED HEALTH CARE EDUCATION/TRAINING PROGRAM

## 2025-04-10 PROCEDURE — 77293 RESPIRATOR MOTION MGMT SIMUL: CPT | Mod: TC | Performed by: STUDENT IN AN ORGANIZED HEALTH CARE EDUCATION/TRAINING PROGRAM

## 2025-04-10 PROCEDURE — 77293 RESPIRATOR MOTION MGMT SIMUL: CPT | Mod: 26,,, | Performed by: STUDENT IN AN ORGANIZED HEALTH CARE EDUCATION/TRAINING PROGRAM

## 2025-04-11 PROCEDURE — 77300 RADIATION THERAPY DOSE PLAN: CPT | Mod: TC | Performed by: STUDENT IN AN ORGANIZED HEALTH CARE EDUCATION/TRAINING PROGRAM

## 2025-04-11 PROCEDURE — 77300 RADIATION THERAPY DOSE PLAN: CPT | Mod: 26,,, | Performed by: STUDENT IN AN ORGANIZED HEALTH CARE EDUCATION/TRAINING PROGRAM

## 2025-04-11 PROCEDURE — 77470 SPECIAL RADIATION TREATMENT: CPT | Mod: 26,59,, | Performed by: STUDENT IN AN ORGANIZED HEALTH CARE EDUCATION/TRAINING PROGRAM

## 2025-04-11 PROCEDURE — 77370 RADIATION PHYSICS CONSULT: CPT | Performed by: STUDENT IN AN ORGANIZED HEALTH CARE EDUCATION/TRAINING PROGRAM

## 2025-04-11 PROCEDURE — 77338 DESIGN MLC DEVICE FOR IMRT: CPT | Mod: TC | Performed by: STUDENT IN AN ORGANIZED HEALTH CARE EDUCATION/TRAINING PROGRAM

## 2025-04-11 PROCEDURE — 77470 SPECIAL RADIATION TREATMENT: CPT | Mod: 59,TC | Performed by: STUDENT IN AN ORGANIZED HEALTH CARE EDUCATION/TRAINING PROGRAM

## 2025-04-11 PROCEDURE — 77338 DESIGN MLC DEVICE FOR IMRT: CPT | Mod: 26,,, | Performed by: STUDENT IN AN ORGANIZED HEALTH CARE EDUCATION/TRAINING PROGRAM

## 2025-04-14 PROCEDURE — 77386 HC IMRT, COMPLEX: CPT | Performed by: STUDENT IN AN ORGANIZED HEALTH CARE EDUCATION/TRAINING PROGRAM

## 2025-04-14 PROCEDURE — 77014 PR  CT GUIDANCE PLACEMENT RAD THERAPY FIELDS: CPT | Mod: 26,,, | Performed by: STUDENT IN AN ORGANIZED HEALTH CARE EDUCATION/TRAINING PROGRAM

## 2025-04-15 PROCEDURE — 77014 PR  CT GUIDANCE PLACEMENT RAD THERAPY FIELDS: CPT | Mod: 26,,, | Performed by: STUDENT IN AN ORGANIZED HEALTH CARE EDUCATION/TRAINING PROGRAM

## 2025-04-15 PROCEDURE — 77386 HC IMRT, COMPLEX: CPT | Performed by: STUDENT IN AN ORGANIZED HEALTH CARE EDUCATION/TRAINING PROGRAM

## 2025-04-21 ENCOUNTER — DOCUMENTATION ONLY (OUTPATIENT)
Dept: RADIATION ONCOLOGY | Facility: CLINIC | Age: 50
End: 2025-04-21
Payer: COMMERCIAL

## 2025-04-21 NOTE — PLAN OF CARE
Day 5 of outpatient radiation to the left lug and mediastinum.Reports fatigue. Denies cough. Experienced 1 episode of SOB at rest. Nursing assessment completed. VS as follows: /85, P 80, T 98.1 (oral), O2 97%. Denies pain, falls.

## 2025-04-28 NOTE — PROGRESS NOTES
[facility]  Colon and Rectal Surgery    [address]  [place], [place]  Tel: [phone]  Fax: [phone]  [email or web address]   MD Andrew Mccloud MD Brian Kann, MD W. Forrest Johnston, MD Matthew Giglia, MD Jennifer Paruch, MD William Kethman, MD Danielle Kay, MD     Patient name: Imelda Kimble   YOB: 1975   MRN: [id number]    Dear Dr. Rudolph,    It was a pleasure seeing Ms. Kimble in the Colon and Rectal Surgery clinic here at [facility].     As you know, Ms. Kimble is a 49 year old woman with a history of HTN and anxiety  who presents for evaluation of rectal adenocarcinoma.  She recently underwent a colonoscopy (performed for screening purposes) for rectal bleeding (developed 2-3 weeks before the colonoscopy - this was everyday) and was found to have a distal rectal mass - biopsy proven adenocarcinoma, see history below. She is scheduled for a CEA and CT CAP this week. She has always had issues with constipation and was treated for presumed IBS-C. She was not having or having pain currently. She has had an open hysterectomy. She has had 5 members of her family diagnosed with cancers - but no family history of colon cancer. She is up-to-date with screening mammography. She denies any nausea, vomiting, fevers, chills, chest pain, or shortness of breath. She denies any fecal incontinence.    3/25/2025  Here for evaluation of rectal pain after hard bowel movement - called her yesterday and prescribed Diltiazem and Miralax. She has had quite a significant response to treatment, although with evidence of metastatic disease. The pain has been present for over 1 month - PET CT was performed after the pain had started. She denies fevers or chills. She is having bowel movements every 2-3 days. FOLFOX > chemoradiation (completed 12/24/2024) > FOLFOX > radiation to pulmonary metastasis.    4/29/2025  Here for surveillance endoscopy. Her perianal  is better but not completely resolved. She is 4 months out nor from radiation. She is receiving radiation for a lung lesions (2). She denies nausea, vomiting, fevers, or chills.     Oncology History   Rectal adenocarcinoma   6/21/2024 Procedure    Colonoscopy - mild diverticular disease, mass in distal rectum (Complete)     6/21/2024 Cancer Staged    Staging form: Colon and Rectum, AJCC 8th Edition  - Clinical stage from 6/21/2024: Stage NAVID (cT2, cN2b, cM1a)     6/24/2024 Initial Diagnosis    Rectal adenocarcinoma  ERROL  - Colonoscopy 6/21/24: infiltrative, ulcerated, sub-mucosal, fungating and villous bleeding mass found in distal rectum, 6-7cm from the anus proximally.   - Distal rectal mass biopsy 6/21/24: well differentiated adenocarcinoma, negative for LVI and perineural invasion. IHC MSI-H low probability with intact nuclear expressions of MLH1, MSH2, MSH6, and PMS2.        7/15/2024 Imaging Significant Findings    MRI Rectal cancer  Low rectal tumor invading the internal sphincter in close approximation to the mesorectal fascia without definite involvement.  Multiple suspicious mesorectal and superior rectal nodes.     *MR STAGE     T: T1/T2 (tumor confined to rectal wall)     N: N+ (short axis ? 9 mm)     Blayne statement: Multiple suspicious mesorectal and superior rectal nodes.     *MRF: threatened (tumor margin within 1-2 mm of MRF) noting this is a low rectal tumor.  No definite evidence of invasion.     Sphincter Involvement: Yes involvement the internal sphincter     Suspicious Extramesorectal Lymph Nodes: No     EMVI: No     7/16/2024 Tumor Markers    Patient's tumor was tested for the following markers: CEA                                              Results of the tumor marker test revealed 2.9     7/17/2024 Imaging Significant Findings    CT CAP  In this patient with a known history of rectal malignancy, there are few borderline prominent lymph nodes adjacent to the rectum measuring up to 8 mm.   There are solid nodules in both lungs, the largest in the lingula measuring up to 1 cm.  Given the patient's primary malignancy, pulmonary metastatic disease cannot be excluded.  Follow-up based upon oncologic guidance.     Prominent soft tissue density in the anterior superior mediastinum which could possibly represent residual thymic tissue with a focal solid structure measuring up to 1.0 x 1.7 cm, possibly an enlarged lymph node versus other soft tissue mass.     Indeterminate focal regions of sclerosis within the T10 vertebra and right sacrum.  Osseous metastatic disease cannot be excluded.     Cholelithiasis without CT evidence for cholecystitis.     Large hiatal hernia.  Diverticulosis coli without diverticulitis.     Large left UPJ stone measuring up to 1.2 cm without hydronephrosis or hydroureter.     7/19/2024 Imaging Significant Findings    PET CT  Hypermetabolic soft tissue thickening along the rectal wall compatible with known malignancy.  There are a few prominent perirectal mesorectal lymph nodes without significant hypermetabolic activity, please note some may be too small for characterization by PET.     0.9 cm lingular pulmonary nodule with mild hypermetabolic activity and prominent hypermetabolic mediastinal lymph node, concerning for metastatic foci.     8/12/2024 -  Chemotherapy    Treatment Summary   Plan Name: OP GI [id number] (oxaliplatin leucovorin fluorouracil) with bevacizumab Q2W  Treatment Goal: Palliative  Status: Active  Start Date: 8/12/2024  End Date: 8/28/2025 (Planned)  Provider: Dion Rudolph MD  Chemotherapy: oxaliplatin (ELOXATIN) 85 mg/m2 = 194 mg in D5W 603.8 mL chemo infusion, 85 mg/m2 = 194 mg, Intravenous, Clinic/HOD 1 time, 4 of 4 cycles  Administration: 194 mg (8/12/2024), 194 mg (8/28/2024), 194 mg (9/12/2024), 194 mg (9/25/2024)  fluorouracil (Adrucil) 2,400 mg/m2 = 5,470 mg in 0.9% NaCl 240 mL chemo infusion, 2,400 mg/m2 = 5,470 mg, Intravenous, Clinic/HOD 1 time,  11 of 24 cycles  Administration: 5,470 mg (8/12/2024), 5,470 mg (8/28/2024), 5,470 mg (9/12/2024), 5,470 mg (9/25/2024), 5,470 mg (10/23/2024), 5,470 mg (11/6/2024), 5,470 mg (11/20/2024), 5,470 mg (12/4/2024), 5,470 mg (1/28/2025), 5,470 mg (2/11/2025), 5,470 mg (2/25/2025)  bevacizumab-awwb (MVASI) 500 mg in 0.9% NaCl 100 mL infusion, 550 mg, Intravenous, Clinic/hospitals 1 time, 10 of 23 cycles  Administration: 500 mg (8/12/2024), 500 mg (8/28/2024), 500 mg (9/12/2024), 500 mg (9/25/2024), 500 mg (10/23/2024), 500 mg (11/6/2024), 500 mg (11/20/2024), 500 mg (1/28/2025), 500 mg (2/11/2025), 500 mg (2/25/2025)     12/18/2024 - 12/24/2024 Radiation Therapy    Treating physician: Kevin Lopez  Course: C1 Pelvis 2024  Treatment Site Ref. ID Energy Dose/Fx (Gy) #Fx Dose Correction (Gy) Total Dose (Gy) Start Date End Date Elapsed Days   IM Rectum PTV_High 6X 5 5 / 5 0 25 12/18/2024 12/24/2024 6        1/24/2025 Imaging Significant Findings    CT CAP  No detrimental change.  Unchanged pulmonary nodules and anterior mediastinal nodule.  Continued attention on follow-up recommended     Cholelithiasis.     3/6/2025 Imaging Significant Findings    MRI Rectal cancer  Compared to 10/31/2024, post treatment primary tumor assessment: Probable Incomplete response (likely residual tumor).     The previous tumor in the low rectum has decreased in size, and no longer demonstrates sphincter invasion.  There is a small focus of questionable diffusion restriction involving the left lateral wall of the low rectum, which may be artifactual but could also represent residual tumor.     *NODES     Suspicious Mesorectal Lymph nodes: No     Suspicious Extramesorectal Lymph nodes: No     3/6/2025 Imaging Significant Findings    MRI Rectal cancer  *TUMOR     Compared to 10/31/2024, post treatment primary tumor assessment: Probable Incomplete response (likely residual tumor).     The previous tumor in the low rectum has decreased in size, and no  longer demonstrates sphincter invasion.  There is a small focus of questionable diffusion restriction involving the left lateral wall of the low rectum, which may be artifactual but could also represent residual tumor.     *NODES     Suspicious Mesorectal Lymph nodes: No     Suspicious Extramesorectal Lymph nodes: No     3/11/2025 Imaging Significant Findings    PET CT  In this patient with history of rectal adenocarcinoma, decreased focal soft tissue thickening and tracer avidity of the left rectal wall. Additional decreased tracer uptake of the lingular pulmonary nodule and mediastinal/pelvic lymph nodes. Findings suggestive of favorable treatment response. No new hypermetabolic tumor.           The patient was informed of the availability of a certified  without charge. A certified  was not necessary for this visit.    Review of Systems  See pertinent review of systems above    Past Medical History:   Diagnosis Date    Anxiety disorder, unspecified     Fibroids     HTN (hypertension)     Sinus problem      Past Surgical History:   Procedure Laterality Date    BILATERAL SALPINGO-OOPHORECTOMY (BSO) Bilateral 12/9/2020    Procedure: SALPINGO-OOPHORECTOMY, BILATERAL;  Surgeon: Yun Recio MD;  Location: [facility] OR;  Service: OB/GYN;  Laterality: Bilateral;  COVID NEGATIVE 12/3    INCISION OF UVULA      INSERTION OF TUNNELED CENTRAL VENOUS CATHETER (CVC) WITH SUBCUTANEOUS PORT Right 7/29/2024    Procedure: INSERTION, SINGLE LUMEN CATHETER WITH PORT, WITH FLUOROSCOPIC GUIDANCE;  Surgeon: Russel Layton MD;  Location: [facility] OR [address] [place] [address];  Service: General;  Laterality: Right;    knee arhroscopy      NASAL SEPTUM SURGERY      SINUS SURGERY      TONSILLECTOMY      TOTAL ABDOMINAL HYSTERECTOMY N/A 12/9/2020    Procedure: HYSTERECTOMY, TOTAL, ABDOMINAL;  Surgeon: Yun Recio MD;  Location: [facility] OR;  Service: OB/GYN;  Laterality: N/A;    UTERINE FIBROID SURGERY       Family  "History   Problem Relation Name Age of Onset    Asthma Mother      Hypertension Mother      COPD Father      Diabetes Father      Hypertension Father      Esophageal cancer Maternal Uncle Jossue     Colon polyps Maternal Grandfather          "every colonoscopy"    Bone cancer Paternal Grandmother      Kidney cancer Maternal Aunt Sherlyn Alvarez        kidney removal    Skin cancer Maternal Uncle Tim         possibly melanoma  recurrent    Throat cancer Maternal Uncle Medardo         late 20s - early 30s    Lung cancer Maternal Uncle Maksim         squamous cell  smoke exposure    Leukemia Maternal Uncle Kendall     Colon cancer Neg Hx       Social History     Tobacco Use    Smoking status: Never    Smokeless tobacco: Never   Substance Use Topics    Alcohol use: Yes     Comment: Socially    Drug use: No     Review of patient's allergies indicates:   Allergen Reactions    Cat's claw Other (See Comments)       Current Outpatient Medications on File Prior to Visit   Medication Sig Dispense Refill    cetirizine (ZYRTEC) 10 MG tablet Take 10 mg by mouth daily as needed.       cholecalciferol, vitamin D3, 5,000 unit Tab Take 5,000 Units by mouth once daily.      citalopram (CELEXA) 20 MG tablet Take 20 mg by mouth every morning.      diltiazem 2% rectal cream Use a pea-sized amount.  Apply to the OUTER [place] of your anus topically 3 (three) times daily to anal area..  Do not insert into the anus. discard after 30 days 30 g 3    EVAMIST 1.53 mg/spray (1.7%) transdermal spray SPRAY 2 SPRAYS TO FOREARM DAILY      fluticasone (FLONASE) 50 mcg/actuation nasal spray 1 spray (50 mcg total) by Each Nare route once daily. 16 g 0    hydroCHLOROthiazide (HYDRODIURIL) 25 MG tablet Take 25 mg by mouth every morning.      LIDOcaine-prilocaine (EMLA) cream Apply topically as needed. 30 g 3    LORazepam (ATIVAN) 0.5 MG tablet Take 1 tablet (0.5 mg total) by mouth every 12 (twelve) hours as needed for Anxiety. 5 tablet 0    niacin 50 MG Tab       " "ondansetron (ZOFRAN-ODT) 8 MG TbDL Take 1 tablet (8 mg total) by mouth every 6 (six) hours as needed (chemotherapy induced nausea/vomiting). 60 tablet 4    oxyCODONE (ROXICODONE) 10 mg Tab immediate release tablet Take 1 tablet (10 mg total) by mouth every 4 (four) hours as needed. 180 tablet 0    phenazopyridine (PYRIDIUM) 200 MG tablet Take 1 tablet (200 mg total) by mouth 3 (three) times daily with meals. 30 tablet 2    polyethylene glycol (GLYCOLAX) 17 gram/dose powder Take 17 g by mouth once daily. 510 g 2    rosuvastatin (CRESTOR) 10 MG tablet Take by mouth every evening.      niacin 50 MG Tab niacin       No current facility-administered medications on file prior to visit.     Physical Examination  BP (!) 134/90 (BP Location: Right arm, Patient Position: Sitting)   Pulse 94   Ht 5' 7" (1.702 m)   Wt 97.3 kg (214 lb 6.4 oz)   LMP 11/18/2020   BMI 33.58 kg/m²      A chaperone was present for the physical examination.    Constitutional: well developed, no cough, no dyspnea, alert, and no acute distress    Head: Normocephalic, no lesions, without obvious abnormality  Eye: Normal external eye, conjunctiva, and lids  Cardiovascular: regular rate and regular rhythm  Respiratory: normal air entry  Gastrointestinal: soft, non-tender    Perianal Skin: deferred perianal exam    Musculoskeletal: full range of motion without pain  Neurologic: alert, oriented, normal speech, no focal findings or movement disorder noted  Psychiatric: appropriate, normal mood    Flexible sigmoidoscopy - deferred due to ongoing discomfort and need for 1 year anniversary colonoscopy     Assessment and Plan of Care    Thank you again for referring Ms. Kimble to my care. In summary, Ms. Kimble is a 49 year old woman presenting with metastatic rectal adenocarcinoma - undergoing chemotherapy and treatment of metastatic disease. Seen previously for anal fissure, returning now for surveillance. MRI with quite significant response to treatment. We " discussed treatment options and have provided the following recommendations:    Plan to perform complete colonoscopy in [place] in the next month - order placed and note sent to scheduling    Please do not hesitate to contact me if you have any questions.      Sabino Menendez MD, FACS, FASC  Department of Colon & Rectal Surgery  [facility]

## 2025-04-29 ENCOUNTER — OFFICE VISIT (OUTPATIENT)
Dept: SURGERY | Facility: CLINIC | Age: 50
End: 2025-04-29
Payer: COMMERCIAL

## 2025-04-29 VITALS
DIASTOLIC BLOOD PRESSURE: 90 MMHG | SYSTOLIC BLOOD PRESSURE: 134 MMHG | BODY MASS INDEX: 33.65 KG/M2 | HEART RATE: 94 BPM | HEIGHT: 67 IN | WEIGHT: 214.38 LBS

## 2025-04-29 DIAGNOSIS — C20 RECTAL CANCER: Primary | ICD-10-CM

## 2025-04-29 PROCEDURE — 99214 OFFICE O/P EST MOD 30 MIN: CPT | Mod: S$GLB,,, | Performed by: STUDENT IN AN ORGANIZED HEALTH CARE EDUCATION/TRAINING PROGRAM

## 2025-04-29 PROCEDURE — 3008F BODY MASS INDEX DOCD: CPT | Mod: CPTII,S$GLB,, | Performed by: STUDENT IN AN ORGANIZED HEALTH CARE EDUCATION/TRAINING PROGRAM

## 2025-04-29 PROCEDURE — 3080F DIAST BP >= 90 MM HG: CPT | Mod: CPTII,S$GLB,, | Performed by: STUDENT IN AN ORGANIZED HEALTH CARE EDUCATION/TRAINING PROGRAM

## 2025-04-29 PROCEDURE — 3075F SYST BP GE 130 - 139MM HG: CPT | Mod: CPTII,S$GLB,, | Performed by: STUDENT IN AN ORGANIZED HEALTH CARE EDUCATION/TRAINING PROGRAM

## 2025-04-29 PROCEDURE — 1159F MED LIST DOCD IN RCRD: CPT | Mod: CPTII,S$GLB,, | Performed by: STUDENT IN AN ORGANIZED HEALTH CARE EDUCATION/TRAINING PROGRAM

## 2025-04-29 PROCEDURE — 99999 PR PBB SHADOW E&M-EST. PATIENT-LVL III: CPT | Mod: PBBFAC,,, | Performed by: STUDENT IN AN ORGANIZED HEALTH CARE EDUCATION/TRAINING PROGRAM

## 2025-05-01 ENCOUNTER — HOSPITAL ENCOUNTER (OUTPATIENT)
Dept: RADIATION THERAPY | Facility: HOSPITAL | Age: 50
Discharge: HOME | End: 2025-05-01
Attending: STUDENT IN AN ORGANIZED HEALTH CARE EDUCATION/TRAINING PROGRAM
Payer: COMMERCIAL

## 2025-05-01 PROCEDURE — 77014 PR  CT GUIDANCE PLACEMENT RAD THERAPY FIELDS: CPT | Mod: 26,,, | Performed by: STUDENT IN AN ORGANIZED HEALTH CARE EDUCATION/TRAINING PROGRAM

## 2025-05-01 PROCEDURE — 77386 HC IMRT, COMPLEX: CPT | Performed by: STUDENT IN AN ORGANIZED HEALTH CARE EDUCATION/TRAINING PROGRAM

## 2025-05-02 ENCOUNTER — DOCUMENTATION ONLY (OUTPATIENT)
Dept: RADIATION ONCOLOGY | Facility: CLINIC | Age: 50
End: 2025-05-02
Payer: COMMERCIAL

## 2025-05-02 PROCEDURE — 77014 PR  CT GUIDANCE PLACEMENT RAD THERAPY FIELDS: CPT | Mod: 26,,, | Performed by: STUDENT IN AN ORGANIZED HEALTH CARE EDUCATION/TRAINING PROGRAM

## 2025-05-02 PROCEDURE — 77386 HC IMRT, COMPLEX: CPT | Performed by: STUDENT IN AN ORGANIZED HEALTH CARE EDUCATION/TRAINING PROGRAM

## 2025-05-02 NOTE — PLAN OF CARE
Day 14 of outpatient radiation to lung/ mediastinum. Tolerating treatment.    Primary Defect Length In Cm (Final Defect Size - Required For Flaps/Grafts): 1.5

## 2025-05-05 ENCOUNTER — HOSPITAL ENCOUNTER (OUTPATIENT)
Dept: PREADMISSION TESTING | Facility: HOSPITAL | Age: 50
Discharge: HOME | End: 2025-05-05
Attending: STUDENT IN AN ORGANIZED HEALTH CARE EDUCATION/TRAINING PROGRAM
Payer: COMMERCIAL

## 2025-05-05 PROCEDURE — 77014 PR  CT GUIDANCE PLACEMENT RAD THERAPY FIELDS: CPT | Mod: 26,,, | Performed by: STUDENT IN AN ORGANIZED HEALTH CARE EDUCATION/TRAINING PROGRAM

## 2025-05-05 PROCEDURE — 77336 RADIATION PHYSICS CONSULT: CPT | Performed by: STUDENT IN AN ORGANIZED HEALTH CARE EDUCATION/TRAINING PROGRAM

## 2025-05-05 PROCEDURE — 77386 HC IMRT, COMPLEX: CPT | Performed by: STUDENT IN AN ORGANIZED HEALTH CARE EDUCATION/TRAINING PROGRAM

## 2025-05-05 NOTE — DISCHARGE INSTRUCTIONS
Colonoscopy Prep Instructions      Date: Thursday 5/8/25  Arrival time:       IMPORTANT: PLEASE READ YOUR INSTRUCTIONS CAREFULLY. FAILURE TO FOLLOW THESE INSTRUCTIONS MAY RESULT IN YOUR PROCEURE BEING CANCELED,RESCHEDULED, OR REPEATED.    Starting NOW--start eating a low/no fiber diet.  Avoid nuts, corn, seeds, seeded veggies, beans, raw veggies, and meats that are harder to digest.  Poultry and fish are preferable to beef and pork.  Make sure your veggies are cooked well.  Breads/rice/noodles--all white is better.  MAKE SURE YOU ARE HYDRATING WELL!.                                         The day before your procedure: Wednesday (5/7/25)  Upon awakening begin the clear liquid diet. DO NOT EAT SOLID FOODS     Wednesday morning mix the entire container of prep.  Start  with lukewarm water to mix the powder, then fill the jug to the top with any qualifying clear liquid of your choice.  You can refrigerate if you choose, as it is commonly preferred chilled.  You can also use a straw to consume to help with minimizing the taste of the medication.      Clear Liquid Diet---- you may have any of the following:     Water, tea, coffee (decaffeinated or regular)     Soft drinks (regular or sugar free)     Gelatin dessert (plain or flavored)     Juice, Gatorade, Powerade, Crystal Lite, lemonade, limeade, Janusz-Aid     Bouillon, clear consommé, 100% fat free beef, chicken, or vegetable broths     Snowballs, popsicles     100% cranberry juice is the only red liquid allowed     Please Avoid the following:     Anything with RED dye     Liquids not specifically listed     Dairy (liquid and powder)     Creamers (liquid and powder)     Alcohol             At __________--take the 4 Dulcolax laxatives, follow with a bottle of water. Typically, after 2-3 hours, you will have 1-2 large bowel movements.      Drink at least 6-8 glasses of clear liquids until you begin your prep       At __________-- : Drink half of the prep within 2 hour.   Pour yourself one cup every 10-15 minutes until half of the jug remains.                         Refrigerate the remaining half of the prep     When you are done with your prep, you can continue to have clear liquids through the night.          Thursday morning, four hours before your arrival time, at __________________ am--    Drink second half of the prep within 2 hour.  Pour yourself one cup every 10-15 minutes until half of the jug remains.                                                Once your prep is complete, you can should stop drinking.  You should be done by no later than _____________am.      Before arrival, with a sip of water, take the following medications. ___________________________      YOU MUST HAVE A  HOME!

## 2025-05-08 ENCOUNTER — ANESTHESIA EVENT (OUTPATIENT)
Dept: ENDOSCOPY | Facility: HOSPITAL | Age: 50
End: 2025-05-08
Payer: COMMERCIAL

## 2025-05-08 ENCOUNTER — ANESTHESIA (OUTPATIENT)
Dept: ENDOSCOPY | Facility: HOSPITAL | Age: 50
End: 2025-05-08
Payer: COMMERCIAL

## 2025-05-08 ENCOUNTER — HOSPITAL ENCOUNTER (OUTPATIENT)
Dept: ENDOSCOPY | Facility: HOSPITAL | Age: 50
Discharge: HOME | End: 2025-05-08
Attending: STUDENT IN AN ORGANIZED HEALTH CARE EDUCATION/TRAINING PROGRAM | Admitting: STUDENT IN AN ORGANIZED HEALTH CARE EDUCATION/TRAINING PROGRAM
Payer: COMMERCIAL

## 2025-05-08 VITALS
SYSTOLIC BLOOD PRESSURE: 121 MMHG | OXYGEN SATURATION: 99 % | HEART RATE: 82 BPM | DIASTOLIC BLOOD PRESSURE: 81 MMHG | TEMPERATURE: 98 F | RESPIRATION RATE: 16 BRPM

## 2025-05-08 DIAGNOSIS — C20 RECTAL CANCER: ICD-10-CM

## 2025-05-08 PROCEDURE — 63600175 PHARM REV CODE 636 W HCPCS: Performed by: NURSE ANESTHETIST, CERTIFIED REGISTERED

## 2025-05-08 PROCEDURE — 45378 DIAGNOSTIC COLONOSCOPY: CPT | Mod: ,,, | Performed by: STUDENT IN AN ORGANIZED HEALTH CARE EDUCATION/TRAINING PROGRAM

## 2025-05-08 PROCEDURE — 45378 DIAGNOSTIC COLONOSCOPY: CPT | Performed by: STUDENT IN AN ORGANIZED HEALTH CARE EDUCATION/TRAINING PROGRAM

## 2025-05-08 PROCEDURE — 37000008 HC ANESTHESIA 1ST 15 MINUTES

## 2025-05-08 PROCEDURE — 37000009 HC ANESTHESIA EA ADD 15 MINS

## 2025-05-08 RX ORDER — LIDOCAINE HYDROCHLORIDE 20 MG/ML
INJECTION, SOLUTION EPIDURAL; INFILTRATION; INTRACAUDAL; PERINEURAL
Status: DISCONTINUED | OUTPATIENT
Start: 2025-05-08 | End: 2025-05-08

## 2025-05-08 RX ORDER — PROPOFOL 10 MG/ML
VIAL (ML) INTRAVENOUS
Status: DISCONTINUED | OUTPATIENT
Start: 2025-05-08 | End: 2025-05-08

## 2025-05-08 RX ADMIN — PROPOFOL 50 MG: 10 INJECTION, EMULSION INTRAVENOUS at 09:05

## 2025-05-08 RX ADMIN — PROPOFOL 140 MG: 10 INJECTION, EMULSION INTRAVENOUS at 09:05

## 2025-05-08 RX ADMIN — PROPOFOL 50 MG: 10 INJECTION, EMULSION INTRAVENOUS at 10:05

## 2025-05-08 RX ADMIN — PROPOFOL 30 MG: 10 INJECTION, EMULSION INTRAVENOUS at 09:05

## 2025-05-08 RX ADMIN — LIDOCAINE HYDROCHLORIDE 60 MG: 20 INJECTION, SOLUTION EPIDURAL; INFILTRATION; INTRACAUDAL; PERINEURAL at 09:05

## 2025-05-08 RX ADMIN — SODIUM CHLORIDE, SODIUM LACTATE, POTASSIUM CHLORIDE, AND CALCIUM CHLORIDE: .6; .31; .03; .02 INJECTION, SOLUTION INTRAVENOUS at 09:05

## 2025-05-08 NOTE — TRANSFER OF CARE
Anesthesia Transfer of Care Note    Patient: Imelda Kimble    Procedure(s) Performed: * No procedures listed *    Patient location: PACU    Anesthesia Type: general    Transport from OR: Transported from OR on 6-10 L/min O2 by face mask with adequate spontaneous ventilation    Post pain: adequate analgesia    Post assessment: no apparent anesthetic complications and tolerated procedure well    Post vital signs: stable    Level of consciousness: sedated    Nausea/Vomiting: no nausea/vomiting    Complications: none    Transfer of care protocol was followed      Last vitals: Visit Vitals  BP (!) 130/97 (BP Location: Left arm, Patient Position: Lying)   Pulse 80   Temp 36.4 °C (97.6 °F) (Oral)   Resp 16   LMP 11/18/2020   SpO2 98%   Breastfeeding No

## 2025-05-08 NOTE — DISCHARGE SUMMARY
Brief Operative Note    Date of surgery: 05/08/2025    Pre-operative Diagnosis:  Screening for colon cancer     Post-operative Diagnosis:   Same as above    Surgeon: Sabino Menendez M.D.    Procedure:  Colonoscopy     Anesthesia: Monitored anesthesia care     Findings:  See operative note    Estimated blood loss: Minimal         Specimens:   See operative note    Discharge Note    Outcome:   Patient tolerated treatment/procedure well without complication and is now ready for discharge.    Disposition:   Home or Self Care    Final diagnosis:    Screening for colon cancer    Follow-up:   With primary care provider    Discharge Procedure Orders   Diet Adult Regular     No dressing needed     Notify your health care provider if you experience any of the following:  temperature >100.4     Notify your health care provider if you experience any of the following:  persistent nausea and vomiting or diarrhea     Notify your health care provider if you experience any of the following:  severe uncontrolled pain     Notify your health care provider if you experience any of the following:  redness, tenderness, or signs of infection (pain, swelling, redness, odor or green/yellow discharge around incision site)     Notify your health care provider if you experience any of the following:  difficulty breathing or increased cough     Notify your health care provider if you experience any of the following:  severe persistent headache     Notify your health care provider if you experience any of the following:  worsening rash     Notify your health care provider if you experience any of the following:  persistent dizziness, light-headedness, or visual disturbances     Notify your health care provider if you experience any of the following:  increased confusion or weakness     Activity as tolerated            Sabino Menendez MD

## 2025-05-08 NOTE — ANESTHESIA POSTPROCEDURE EVALUATION
Anesthesia Post Evaluation    Patient: Imelda Kimble    Procedure(s) Performed: * No procedures listed *    Final Anesthesia Type: general      Patient location during evaluation: PACU  Patient participation: Yes- Able to Participate  Level of consciousness: awake and alert, oriented and awake  Post-procedure vital signs: reviewed and stable  Pain management: adequate  Airway patency: patent    PONV status at discharge: No PONV  Anesthetic complications: no      Cardiovascular status: blood pressure returned to baseline, hemodynamically stable and stable  Respiratory status: unassisted, spontaneous ventilation and room air  Hydration status: euvolemic  Follow-up not needed.              Vitals Value Taken Time   /82 05/08/25 10:18   Temp 36.6 °C (97.8 °F) 05/08/25 10:08   Pulse 74 05/08/25 10:18   Resp 16 05/08/25 10:18   SpO2 100 % 05/08/25 10:18         No case tracking events are documented in the log.      Pain/Jessica Score: Jessica Score: 10 (5/8/2025 10:18 AM)

## 2025-05-08 NOTE — ANESTHESIA PREPROCEDURE EVALUATION
05/08/2025        Imelda Kimble, 1975  [id number]      Subjective    Imelda Kimble is a 49 y.o. female w/ a significant PMHx of HTN, anxiety, BMI 38, and rectal adenocarcinoma.         ECHO:  No results found for this or any previous visit.      Prev Airway:     Intubation  Performed by: Timothy Easton CRNA  Authorized by: Aaron Moore II, MD      Intubation:     Induction:  Rapid sequence induction    Intubated:  Postinduction    Mask Ventilation:  N/a    Attempts:  1    Attempted By:  CRNA    Method of Intubation:  Direct    Blade:  Kate 3    Laryngeal View Grade: Grade I - full view of chords      Difficult Airway Encountered?: No      Complications:  None    Airway Device:  Direct and oral endotracheal tube    Airway Device Size:  7.0    Style/Cuff Inflation:  Cuffed (inflated to minimal occlusive pressure)    Inflation Amount (mL):  7    Tube secured:  22    Secured at:  The lips    Placement Verified By:  Auscultation and Capnometry    Complicating Factors:  None    Findings Post-Intubation:  Bilateral breath sounds, positive ETCO2 and atraumatic / condition of teeth unchanged               LDA: None documented.       Drips: None documented.      Patient Active Problem List   Diagnosis    Rectal adenocarcinoma    Malignant neoplasm metastatic to left lung       Review of patient's allergies indicates:   Allergen Reactions    Cat's claw Other (See Comments)       Current Inpatient Medications:       No current facility-administered medications on file prior to encounter.     Current Outpatient Medications on File Prior to Encounter   Medication Sig Dispense Refill    citalopram (CELEXA) 20 MG tablet Take 20 mg by mouth every morning.      EVAMIST 1.53 mg/spray (1.7%) transdermal spray SPRAY 2 SPRAYS TO FOREARM DAILY      fluticasone (FLONASE) 50 mcg/actuation nasal spray 1 spray (50 mcg total) by Each Nare route once daily. 16 g 0    cetirizine (ZYRTEC) 10 MG tablet Take 10 mg by mouth daily  as needed.       cholecalciferol, vitamin D3, 5,000 unit Tab Take 5,000 Units by mouth once daily.      diltiazem 2% rectal cream Use a pea-sized amount.  Apply to the OUTER [place] of your anus topically 3 (three) times daily to anal area..  Do not insert into the anus. discard after 30 days 30 g 3    hydroCHLOROthiazide (HYDRODIURIL) 25 MG tablet Take 25 mg by mouth every morning.      LIDOcaine-prilocaine (EMLA) cream Apply topically as needed. 30 g 3    LORazepam (ATIVAN) 0.5 MG tablet Take 1 tablet (0.5 mg total) by mouth every 12 (twelve) hours as needed for Anxiety. 5 tablet 0    niacin 50 MG Tab niacin      niacin 50 MG Tab       ondansetron (ZOFRAN-ODT) 8 MG TbDL Take 1 tablet (8 mg total) by mouth every 6 (six) hours as needed (chemotherapy induced nausea/vomiting). 60 tablet 4    oxyCODONE (ROXICODONE) 10 mg Tab immediate release tablet Take 1 tablet (10 mg total) by mouth every 4 (four) hours as needed. 180 tablet 0    phenazopyridine (PYRIDIUM) 200 MG tablet Take 1 tablet (200 mg total) by mouth 3 (three) times daily with meals. 30 tablet 2    polyethylene glycol (GLYCOLAX) 17 gram/dose powder Take 17 g by mouth once daily. 510 g 2    rosuvastatin (CRESTOR) 10 MG tablet Take by mouth every evening.         Past Surgical History:   Procedure Laterality Date    BILATERAL SALPINGO-OOPHORECTOMY (BSO) Bilateral 12/9/2020    Procedure: SALPINGO-OOPHORECTOMY, BILATERAL;  Surgeon: Yun Recio MD;  Location: [facility];  Service: OB/GYN;  Laterality: Bilateral;  COVID NEGATIVE 12/3    INCISION OF UVULA      INSERTION OF TUNNELED CENTRAL VENOUS CATHETER (CVC) WITH SUBCUTANEOUS PORT Right 7/29/2024    Procedure: INSERTION, SINGLE LUMEN CATHETER WITH PORT, WITH FLUOROSCOPIC GUIDANCE;  Surgeon: Russel Layton MD;  Location: [address];  Service: General;  Laterality: Right;    knee arhroscopy      NASAL SEPTUM SURGERY      SINUS SURGERY      TONSILLECTOMY      TOTAL ABDOMINAL  HYSTERECTOMY N/A 12/9/2020    Procedure: HYSTERECTOMY, TOTAL, ABDOMINAL;  Surgeon: Yamilka Recio MD;  Location: [facility];  Service: OB/GYN;  Laterality: N/A;    UTERINE FIBROID SURGERY         Social History:  Tobacco Use: Low Risk  (5/8/2025)    Patient History     Smoking Tobacco Use: Never     Smokeless Tobacco Use: Never     Passive Exposure: Not on file       Alcohol Use: Not At Risk (3/20/2025)    AUDIT-C     Frequency of Alcohol Consumption: Never     Average Number of Drinks: Patient does not drink     Frequency of Binge Drinking: Never       Objective    Vital Signs Range:  BMI Readings from Last 1 Encounters:   04/29/25 33.58 kg/m²       Temp:  [36.4 °C (97.6 °F)]   Pulse:  [80]   Resp:  [16]   BP: (130)/(97)   SpO2:  [98 %]        Significant Labs:        Component Value Date/Time    WBC 9.79 03/10/2025 1338    HGB 12.8 03/10/2025 1338    HCT 37.6 03/10/2025 1338     03/10/2025 1338     03/10/2025 1338    K 3.3 (L) 03/10/2025 1338    CL 96 03/10/2025 1338    CO2 28 03/10/2025 1338     (H) 03/10/2025 1338    BUN 16 03/10/2025 1338    CREATININE 1.2 03/10/2025 1338    CALCIUM 9.1 03/10/2025 1338    ALBUMIN 3.6 03/10/2025 1338    PROT 7.6 03/10/2025 1338    ALKPHOS 142 03/10/2025 1338    BILITOT 0.4 03/10/2025 1338    AST 32 03/10/2025 1338    ALT 18 03/10/2025 1338        Please see Results Review for additional labs.     Diagnostic Studies: All relevant studies, reviewed.      EKG:   Results for orders placed or performed during the hospital encounter of 12/07/20   EKG 12-lead    Collection Time: 12/07/20  4:02 PM    Narrative    Test Reason : D25.1, PREOP    Vent. Rate : 080 BPM     Atrial Rate : 080 BPM     P-R Int : 154 ms          QRS Dur : 094 ms      QT Int : 380 ms       P-R-T Axes : 055 061 034 degrees     QTc Int : 438 ms    Normal sinus rhythm  Normal ECG  No previous ECGs available  Confirmed by Zeke Wang MD ([id number]) on 12/7/2020 5:19:46 PM    Referred By: YAMILKA  PO           Confirmed By:Zeke Wang MD                                                                                                                  05/08/2025  Imelda Kimble is a 49 y.o., female.      Pre-op Assessment    I have reviewed the Patient Summary Reports.     I have reviewed the Nursing Notes. I have reviewed the NPO Status.   I have reviewed the Medications.     Review of Systems  Anesthesia Hx:  No problems with previous Anesthesia   History of prior surgery of interest to airway management or planning:          Denies Family Hx of Anesthesia complications.    Denies Personal Hx of Anesthesia complications.                    Social:  Non-Smoker, Social Alcohol Use       Hematology/Oncology:                      Current/Recent Cancer.            Oncology Comments: Rectal adenocarcinoma     EENT/Dental:  EENT/Dental Normal           Cardiovascular:  Exercise tolerance: good   Hypertension, well controlled    Denies CAD.    Denies CABG/stent.     Denies CHF.    no hyperlipidemia   ECG has been reviewed.                            Pulmonary:    Denies COPD.  Denies Asthma.     Denies Sleep Apnea.                Renal/:  Renal/ Normal  Denies Chronic Renal Disease.                Hepatic/GI:  Hepatic/GI Normal     Denies GERD.                Musculoskeletal:  Musculoskeletal Normal                OB/GYN/PEDS:  Hx hysterectomy           Neurological:  Neurology Normal   Denies CVA.    Denies Headaches. Denies Seizures.                                Endocrine:  Endocrine Normal Denies Diabetes.         Obesity / BMI > 30  Psych:  Denies Psychiatric History. anxiety               Physical Exam  General: Well nourished, Cooperative, Alert and Oriented    Airway:  Mallampati: II / II  Mouth Opening: Normal  TM Distance: Normal  Tongue: Normal  Neck ROM: Normal ROM    Dental:  Intact      Anesthesia Plan  Type of Anesthesia, risks & benefits discussed:    Anesthesia Type: Gen Natural Airway,  MAC  Intra-op Monitoring Plan: Standard ASA Monitors  Post Op Pain Control Plan: multimodal analgesia  Induction:  IV  Informed Consent: Informed consent signed with the Patient and all parties understand the risks and agree with anesthesia plan.  All questions answered. Patient consented to blood products? Yes  ASA Score: 3  Day of Surgery Review of History & Physical: H&P Update referred to the surgeon/provider.I have interviewed and examined the patient. I have reviewed the patient's H&P dated: 5/8/25. There are no significant changes.     Ready For Surgery From Anesthesia Perspective.     .

## 2025-05-08 NOTE — H&P
[facility]  Colon and Rectal Surgery    [address]  [place], [place]  Tel: [phone]  Fax: [phone]  [email or web address]   MD Andrew Mccloud MD Brian Kann, MD W. Forrest Johnston, MD Matthew Zelhart, MD Jennifer Paruch, MD William Kethman, MD Danielle Kay, MD Steven Schuetz, MD     Patient name: Imelda Kimble   YOB: 1975   MRN: [id number]  Date of procedure: 05/08/2025    Procedure: Colonoscopy  Indications: Rectal cancer    The patient was informed of the availability of a certified  without charge. A certified  was not necessary for this visit.    Sedation plan: MAC  ASA: ASA 2 - Patient with mild systemic disease with no functional limitations    Review of Systems  See above    Past Medical History:   Diagnosis Date    Anxiety disorder, unspecified     Fibroids     HTN (hypertension)     Sinus problem      Past Surgical History:   Procedure Laterality Date    BILATERAL SALPINGO-OOPHORECTOMY (BSO) Bilateral 12/9/2020    Procedure: SALPINGO-OOPHORECTOMY, BILATERAL;  Surgeon: Yun Recio MD;  Location: [facility];  Service: OB/GYN;  Laterality: Bilateral;  COVID NEGATIVE 12/3    INCISION OF UVULA      INSERTION OF TUNNELED CENTRAL VENOUS CATHETER (CVC) WITH SUBCUTANEOUS PORT Right 7/29/2024    Procedure: INSERTION, SINGLE LUMEN CATHETER WITH PORT, WITH FLUOROSCOPIC GUIDANCE;  Surgeon: Russel Layton MD;  Location: [address];  Service: General;  Laterality: Right;    knee arhroscopy      NASAL SEPTUM SURGERY      SINUS SURGERY      TONSILLECTOMY      TOTAL ABDOMINAL HYSTERECTOMY N/A 12/9/2020    Procedure: HYSTERECTOMY, TOTAL, ABDOMINAL;  Surgeon: Yun Recio MD;  Location: [facility];  Service: OB/GYN;  Laterality: N/A;    UTERINE FIBROID SURGERY       Family History   Problem Relation Name Age of Onset    Asthma Mother      Hypertension Mother      COPD Father      Diabetes  "Father      Hypertension Father      Esophageal cancer Maternal Uncle Jossue     Colon polyps Maternal Grandfather          "every colonoscopy"    Bone cancer Paternal Grandmother      Kidney cancer Maternal Aunt Sherlyn 59        kidney removal    Skin cancer Maternal Uncle Tim         possibly melanoma  recurrent    Throat cancer Maternal Uncle Medardo         late 20s - early 30s    Lung cancer Maternal Uncle Maksim         squamous cell  smoke exposure    Leukemia Maternal Uncle Kendall     Colon cancer Neg Hx       Social History[1]  Review of patient's allergies indicates:   Allergen Reactions    Cat's claw Other (See Comments)       Prior to Admission medications    Medication Sig Start Date End Date Taking? Authorizing Provider   citalopram (CELEXA) 20 MG tablet Take 20 mg by mouth every morning.   Yes Provider, Historical   EVAMIST 1.53 mg/spray (1.7%) transdermal spray SPRAY 2 SPRAYS TO FOREARM DAILY 3/24/23  Yes Provider, Historical   fluticasone (FLONASE) 50 mcg/actuation nasal spray 1 spray (50 mcg total) by Each Nare route once daily. 5/28/18  Yes Angel Ye PA-C   bisacodyL (DULCOLAX) 5 mg EC tablet Take 4 tablets (20 mg total) by mouth once. for 1 dose 5/7/25 5/7/25  Sabino Menendez MD   cetirizine (ZYRTEC) 10 MG tablet Take 10 mg by mouth daily as needed.     Provider, Historical   cholecalciferol, vitamin D3, 5,000 unit Tab Take 5,000 Units by mouth once daily.    Provider, Historical   diltiazem 2% rectal cream Use a pea-sized amount.  Apply to the OUTER [place] of your anus topically 3 (three) times daily to anal area..  Do not insert into the anus. discard after 30 days 3/24/25   Sabino Menendez MD   hydroCHLOROthiazide (HYDRODIURIL) 25 MG tablet Take 25 mg by mouth every morning.    Provider, Historical   LIDOcaine-prilocaine (EMLA) cream Apply topically as needed. 8/12/24   Kiera Alexander PA-C   LORazepam (ATIVAN) 0.5 MG tablet Take 1 tablet (0.5 mg total) by mouth every 12 " (twelve) hours as needed for Anxiety. 10/23/24 10/23/25  Dion Rudolph MD   niacin 50 MG Tab niacin    Provider, Historical   niacin 50 MG Tab     Provider, Historical   ondansetron (ZOFRAN-ODT) 8 MG TbDL Take 1 tablet (8 mg total) by mouth every 6 (six) hours as needed (chemotherapy induced nausea/vomiting). 9/16/24 9/16/25  Kiera Alexander, JUNIOR   oxyCODONE (ROXICODONE) 10 mg Tab immediate release tablet Take 1 tablet (10 mg total) by mouth every 4 (four) hours as needed. 3/25/25   Eliane Pozo, CNS   phenazopyridine (PYRIDIUM) 200 MG tablet Take 1 tablet (200 mg total) by mouth 3 (three) times daily with meals. 1/13/25   Eliane Pozo, CNS   polyethylene glycol (GOLYTELY) 236-22.74-6.74 -5.86 gram suspension Take 4,000 mLs by mouth once. for 1 dose 5/7/25 5/7/25  Sabino Menendez MD   polyethylene glycol (GLYCOLAX) 17 gram/dose powder Take 17 g by mouth once daily. 3/24/25   Sabino Menendez MD   rosuvastatin (CRESTOR) 10 MG tablet Take by mouth every evening. 3/24/23   Provider, Historical       Physical Examination  LMP 11/18/2020   Breastfeeding No      Constitutional: well developed, no cough, no dyspnea, alert, and no acute distress    Head: Normocephalic, no lesions, without obvious abnormality  Eye: Normal external eye, conjunctiva, and lids, PERRL  Cardiovascular: regular rate and regular rhythm  Respiratory: normal air entry  Gastrointestinal: soft, non-tender, without masses or organomegaly  Neurologic: alert, oriented, normal speech, no focal findings or movement disorder noted  Psychiatric: appropriate, normal mood    Plan of Care    It was a pleasure meeting Ms. Kimble today - we will plan to perform a colonoscopy with monitored anesthesia care. The details of the procedure, the possible need for biopsy or polypectomy and the potential risks including bleeding, perforation, missed polyps were discussed in detail and they consented to undergo the procedure.      Sabino Menendez,  MD, FACS, FASCRS  Department of Colon & Rectal Surgery  [facility]         [1]   Social History  Tobacco Use    Smoking status: Never    Smokeless tobacco: Never   Substance Use Topics    Alcohol use: Yes     Comment: Socially    Drug use: No

## 2025-05-13 ENCOUNTER — DOCUMENTATION ONLY (OUTPATIENT)
Dept: RADIATION ONCOLOGY | Facility: CLINIC | Age: 50
End: 2025-05-13
Payer: COMMERCIAL

## 2025-05-13 NOTE — PLAN OF CARE
Completed 15 of 15  outpatient radiation treatments to the left lung. Overall tolerated therapy well. RTC in 6 weeks, unless symptoms warrant otherwise.

## 2025-05-20 ENCOUNTER — TELEPHONE (OUTPATIENT)
Dept: SURGERY | Facility: CLINIC | Age: 50
End: 2025-05-20
Payer: COMMERCIAL

## 2025-05-20 DIAGNOSIS — C26.9 MALIGNANT NEOPLASM OF ILL-DEFINED SITES WITHIN THE DIGESTIVE SYSTEM: Primary | ICD-10-CM

## 2025-05-20 NOTE — TELEPHONE ENCOUNTER
Discussed tumor board recommendations for APR, posterior vaginectomy and flap reconstruction    Last dose of chemotherapy was prior to lung radiation - was stopped to allow for this    Chemotherapy > radiation > chemotherapy > stopped to do radiation of lung > repeat MRI and endoscopic assessment    Discussed care with Dr. Rudolph - since last chemotherapy was in March and scans were at that time as well, will plan for repeat CT CAP/MRI - if no evidence of disease progression will work on arranging surgical team for above procedure which I suspect will take a few weeks    Referral to Plastic surgery for flap reconstruction  Will discuss with Gynecologic Oncology to assist in vaginectomy portion of procedure  Repeat MRI and CT CAP and labs  Will need WOCN prior as well    If CT reveals disease progression will restart chemotherapy and plan for surgery at a later date      Sabino Menendez MD, FACS, FASCRS  Department of Colon & Rectal Surgery  [facility]

## 2025-05-21 ENCOUNTER — TELEPHONE (OUTPATIENT)
Dept: PLASTIC SURGERY | Facility: CLINIC | Age: 50
End: 2025-05-21
Payer: COMMERCIAL

## 2025-05-21 ENCOUNTER — TELEPHONE (OUTPATIENT)
Dept: GYNECOLOGIC ONCOLOGY | Facility: CLINIC | Age: 50
End: 2025-05-21
Payer: COMMERCIAL

## 2025-05-21 ENCOUNTER — TELEPHONE (OUTPATIENT)
Dept: SURGERY | Facility: CLINIC | Age: 50
End: 2025-05-21
Payer: COMMERCIAL

## 2025-05-21 DIAGNOSIS — C20 RECTAL ADENOCARCINOMA: Primary | ICD-10-CM

## 2025-05-21 NOTE — TELEPHONE ENCOUNTER
RN called pt to get her set up for all appts requested by Dr. Menendez and to review sx instructions.  RN will email all instructions to the pt for review as well.  All appt details discussed as well as fasting instructions for CT scan on 5/26.  Pt will have her MRI on 5/25, CT and labs on 5/26, marking on 5/29 and preop appt with Dr. Medina on 5/29.  Still awaiting plastics appt.  Pt verbalized understanding to all.

## 2025-05-25 ENCOUNTER — HOSPITAL ENCOUNTER (OUTPATIENT)
Dept: RADIOLOGY | Facility: HOSPITAL | Age: 50
Discharge: HOME | End: 2025-05-25
Attending: STUDENT IN AN ORGANIZED HEALTH CARE EDUCATION/TRAINING PROGRAM
Payer: COMMERCIAL

## 2025-05-25 DIAGNOSIS — C26.9 MALIGNANT NEOPLASM OF ILL-DEFINED SITES WITHIN THE DIGESTIVE SYSTEM: ICD-10-CM

## 2025-05-25 PROCEDURE — 72195 MRI PELVIS W/O DYE: CPT | Mod: 26,,, | Performed by: RADIOLOGY

## 2025-05-25 PROCEDURE — 72195 MRI PELVIS W/O DYE: CPT | Mod: TC

## 2025-05-26 ENCOUNTER — TELEPHONE (OUTPATIENT)
Dept: GYNECOLOGIC ONCOLOGY | Facility: CLINIC | Age: 50
End: 2025-05-26
Payer: COMMERCIAL

## 2025-05-28 ENCOUNTER — TELEPHONE (OUTPATIENT)
Dept: SURGERY | Facility: CLINIC | Age: 50
End: 2025-05-28
Payer: COMMERCIAL

## 2025-05-28 DIAGNOSIS — C26.9 MALIGNANT NEOPLASM OF ILL-DEFINED SITES WITHIN THE DIGESTIVE SYSTEM: Primary | ICD-10-CM

## 2025-05-28 NOTE — TELEPHONE ENCOUNTER
Spoke with patient and all appointments scheduled for imaging and labs. Details confirmed verbally over phone and viewable in portal.

## 2025-05-28 NOTE — TELEPHONE ENCOUNTER
----- Message from Tana sent at 5/28/2025  8:58 AM CDT -----  Regarding: Missed Call  Contact: Imelda  Returning a Missed CallCaller: Imelda Returning call to: Joanna can be reached @: [phone] (home) Nature of the call: Missed a call from Janet about scheduling. Would like a madeleine back as soona s your avail. Please c all back twice if you reach voicemail . Phone doesn't always ring for unknown numbers

## 2025-05-28 NOTE — TELEPHONE ENCOUNTER
Voicemail left with callback number regarding rescheduling clinic visit. Instructed to call back to discuss.

## 2025-06-03 ENCOUNTER — HOSPITAL ENCOUNTER (OUTPATIENT)
Dept: RADIOLOGY | Facility: HOSPITAL | Age: 50
Discharge: HOME | End: 2025-06-03
Attending: STUDENT IN AN ORGANIZED HEALTH CARE EDUCATION/TRAINING PROGRAM
Payer: COMMERCIAL

## 2025-06-03 DIAGNOSIS — C26.9 MALIGNANT NEOPLASM OF ILL-DEFINED SITES WITHIN THE DIGESTIVE SYSTEM: ICD-10-CM

## 2025-06-03 PROCEDURE — 71260 CT THORAX DX C+: CPT | Mod: 26,,, | Performed by: RADIOLOGY

## 2025-06-03 PROCEDURE — 74177 CT ABD & PELVIS W/CONTRAST: CPT | Mod: 26,,, | Performed by: RADIOLOGY

## 2025-06-03 PROCEDURE — 71260 CT THORAX DX C+: CPT | Mod: TC

## 2025-06-03 PROCEDURE — 25500020 PHARM REV CODE 255: Performed by: STUDENT IN AN ORGANIZED HEALTH CARE EDUCATION/TRAINING PROGRAM

## 2025-06-03 RX ADMIN — IOHEXOL 30 ML: 350 INJECTION, SOLUTION INTRAVENOUS at 11:06

## 2025-06-03 RX ADMIN — IOHEXOL 75 ML: 350 INJECTION, SOLUTION INTRAVENOUS at 11:06

## 2025-06-04 ENCOUNTER — RESULTS FOLLOW-UP (OUTPATIENT)
Dept: SURGERY | Facility: CLINIC | Age: 50
End: 2025-06-04

## 2025-06-09 ENCOUNTER — OFFICE VISIT (OUTPATIENT)
Dept: RADIATION ONCOLOGY | Facility: CLINIC | Age: 50
End: 2025-06-09
Payer: COMMERCIAL

## 2025-06-09 ENCOUNTER — HOSPITAL ENCOUNTER (OUTPATIENT)
Dept: CARDIOLOGY | Facility: CLINIC | Age: 50
Discharge: HOME | End: 2025-06-09
Payer: COMMERCIAL

## 2025-06-09 DIAGNOSIS — R07.9 LEFT-SIDED CHEST PAIN: Primary | ICD-10-CM

## 2025-06-09 DIAGNOSIS — R07.9 LEFT-SIDED CHEST PAIN: ICD-10-CM

## 2025-06-09 LAB
OHS QRS DURATION: 86 MS
OHS QTC CALCULATION: 445 MS

## 2025-06-09 PROCEDURE — 99999 PR PBB SHADOW E&M-EST. PATIENT-LVL II: CPT | Mod: PBBFAC,,, | Performed by: STUDENT IN AN ORGANIZED HEALTH CARE EDUCATION/TRAINING PROGRAM

## 2025-06-09 PROCEDURE — 1159F MED LIST DOCD IN RCRD: CPT | Mod: CPTII,S$GLB,, | Performed by: STUDENT IN AN ORGANIZED HEALTH CARE EDUCATION/TRAINING PROGRAM

## 2025-06-09 PROCEDURE — 99214 OFFICE O/P EST MOD 30 MIN: CPT | Mod: S$GLB,,, | Performed by: STUDENT IN AN ORGANIZED HEALTH CARE EDUCATION/TRAINING PROGRAM

## 2025-06-09 PROCEDURE — 93010 ELECTROCARDIOGRAM REPORT: CPT | Mod: S$GLB,,, | Performed by: STUDENT IN AN ORGANIZED HEALTH CARE EDUCATION/TRAINING PROGRAM

## 2025-06-09 PROCEDURE — 1160F RVW MEDS BY RX/DR IN RCRD: CPT | Mod: CPTII,S$GLB,, | Performed by: STUDENT IN AN ORGANIZED HEALTH CARE EDUCATION/TRAINING PROGRAM

## 2025-06-10 NOTE — PROGRESS NOTES
[facility] - Radiation Oncology follow-up Note           Date of Service: 06/09/2025     Chief Complaint: rectal cancer, status post induction chemo and short course radiation, s/p hypofx to chest lesions     Reason for visit:  Post radiation to the thorax toxicity check     Referring Physician: Dr Menendez ([facility])     Implantable devices: denies     Therapy to Date:  Course: C1 Pelvis 2024  Treatment Site Ref. ID Energy Dose/Fx (Gy) #Fx Dose Correction (Gy) Total Dose (Gy) Start Date End Date Elapsed Days   IM Rectum PTV_High 6X 5 5 / 5 0 25 12/18/2024 12/24/2024 6         Diagnosis/Assessment:   Imelda Kimble is a 49 y.o. woman with Stage NAVID (cT2, cN2b, cM1a) distal G1 adenocarcinoma pMMR, 5.6 cm long, below the anterior peritoneal reflection, MRF threatened, with involvement the internal sphincter, EMVI-, with multiple (8+) small suspicious mesorectal and superior rectal nodes. PET scan showed evidence of metastatic pulmonary disease  PMH hysterectomy/BSO, anxiety, HTN, Left knee replacement   She received mFOLFOX with bevacizumab Q2W from 08/12/2024 - 12/04/2024 with Dr Rudolph   Discussed at Colorectal surgery tumor board plan for short course radiation  She is s/p short course radiation therapy 25 Gy in 5 daily fractions with vaginal dilator use, given Monday through Friday using IMRT to decrease small bowel and bladder dose completed 12/24/2024.  Received systemic therapy 8/12/2024 - 2/25/2025 mFOLFOX6 with bevacizumab Q2W with Dr Rudolph    Her latest PETCT of the lung shows 2 persistent lung nodules unchanged in size compared to prior (1 prevascular node, 1 lingular). Patient is asymptomatic from her chest lesions.  She is status post hypofractionated radiation to both lesions 60 Gy in 15 fractions given daily using VMAT  for local control completed on 05/05/2025     Latest CT scan shows stable thoracic lesions.  Latest MRI rectum shows possible complete/near complete response of the  primary tumor with no evidence of lymphadenopathy    ECOG 0  Main issue today is chest pain        Plan      - I discussed the chest pain with  who ordered an EKG to be done today before the patient leaves  - continue to follow with Dr. Rudolph  - follow up with Dr. Menendez:    - return in 3 months after surveillance CT scans        Interval history:      07/16/2024 initial radiation oncology evaluation - after finding out patient had stage IV disease to lungs  plan was changed to induction chemotherapy        07/19/2024 PET CT scan  0.9 pulmonary nodule with mild hypermetabolic activity in prominent hypermetabolic mediastinal lymph node concerning for metastasis          08/12/2024 - 12/04/2024 Dr Rudolph gave  mFOLFOX with bevacizumab Q2W (last dose of bevacizumab 11/20/2024).     10/21/2024 CT CAP              Perirectal lymph nodes significantly decreased in size     10/31/2024 MRI rectum - motion degraded              Treatment response from 5.6 cm long down to 2.5 cm long primary tumor, no visible regional lymph nodes        11/13/2024 Colorectal surgery tumor board              Plan for a short course radiation therapy  12/02/2024 CEA 2.5   12/24/2024 Tolerated radiation therapy well with that expected acute toxicities  Return to radiation oncology clinic in 4-6 weeks     1/27/2025 Medical Oncology Dr. Rudolph              c/w mFOLFOX6 (oxaliplatin leucovorin fluorouracil) with bevacizumab Q2W   Discussed options of taking treatment break given low volume burden vs restarting maintenance 5-FU + cherelle vs chemo + consideration of consolidative local therapies to lung met and anterior mediastinal lymph node.         01/24/2025 CT chest abdomen and pelvis              Solid 5 mm left upper lobe nodule unchanged in size compared to prior              Solid 3 mm right middle lobe nodule unchanged in size compared to prior                  8/12/2024 - 2/25/2025 mFOLFOX6 with bevacizumab Q2W          03/06/2025 MRI rectum              1 cm long distal rectal tumor decreased in size compared to prior MRI of October 2024, no longer showing sphincter invasion, no evidence of lymphadenopathy     03/11/2025 PET FDG              Decreased soft tissue thickening and tracer avidity of left rectal wall SUV max 9.4              Decreased tracer uptake of lingular pulmonary nodule 0.9 cm SUV max similar to background              Decreased tracer uptake of prevascular lymph node 1.0 cm SUV max 3.1              No new hypermetabolic tumor in the chest                                     FDG PET-CT 07/19/2024                  FDG PET-CT 03/21/2025                                                                  3/10/2025 CEA 1.7         5/5/2025 Tolerated radiation therapy well with the expected toxicities  Recommended taking naps and rest  Follow up in 3 months with scans per Dr. Rudolph      05/25/2025 MRI rectum   Possible complete/near complete response of primary tumor   No lymphadenopathy    06/03/2025 CT chest abdomen pelvis   Unchanged small pulmonary nodules   No abdominopelvic lymphadenopathy   No suspicious lesion        Subjective:   During the follow-up today patient reports feeling well overall she denies any major systemic issues.  She denies any cough, shortness of breath, hemoptysis, wheezing.    However she did mentioned some chest pain with exertion described as sharp/stabbing focal, does not radiate, under the left breast, up to 8/10.  The frequency has increased over the last couple of weeks    Otherwise she denies any GI issues such as nausea, vomiting, abdominal pain, diarrhea, constipation, rectal bleeding, or tenesmus.  She does report GERD.    She denies major urinary issues or vaginal issues.       Social history  From [place], single,  at [employer] [place] at  [facility] in [place], [place].  (went to school at [employer] in [place])       Medications Ordered Prior to Encounter[1]    Review of patient's allergies indicates:   Allergen Reactions    Cat's claw Other (See Comments)          Review of Systems   Negative unless as above         HPI:   Imelda Kimble is a 48 y.o. woman with recent diagnosis of rectal cancer after presenting with screening colonoscopy     Oncologic history:  06/21/24 colonoscopy (Dr Rasheed)              Mild diverticulosis              Infiltrative ulcerated submucosal fungating and villous bleeding mass in distal rectum, spanning from anal verge to 6-7cm proximally              Pathology: G1 adenocarcinoma pMMR, without PNI or LVI     7/15/2024 MRI rectum  5.6 cm long low rectal lesion, below the anterior peritoneal reflection, MRF threatened, with involvement the internal sphincter, EMVI-, T1/T2, with multiple (8+) small suspicious mesorectal and superior rectal nodes.                    Objective:   Physical Exam  Vitals reviewed.     Constitutional:       Appearance: Normal appearance.   HENT:      Head: Normocephalic and atraumatic.   Eyes:      Conjunctiva/sclera: Conjunctivae normal.  Musculoskeletal:         General: Normal range of motion.   Neurological:      General: No focal deficit present.      Mental Status: alert and oriented  Psychiatric:         Mood and Affect: Mood normal.         Behavior: Behavior normal.      Imaging: I have personally reviewed the patient's available images and reports and summarized pertinent findings above in HPI.      Laboratory: I have personally reviewed the patient's available laboratory values and summarized pertinent findings above in HPI.         I spent approximately 30 minutes reviewing the available records and evaluating the patient, out of which over 50% of the time was spent face to face with the patient in counseling and coordinating this patient's care.     Thank you for the opportunity to care for this patient. Please do not hesitate to  contact me with any questions.     Kevin Lopez MD/PhD       [1]   Current Outpatient Medications on File Prior to Visit   Medication Sig Dispense Refill    cetirizine (ZYRTEC) 10 MG tablet Take 10 mg by mouth daily as needed.       cholecalciferol, vitamin D3, 5,000 unit Tab Take 5,000 Units by mouth once daily.      citalopram (CELEXA) 20 MG tablet Take 20 mg by mouth every morning.      diltiazem 2% rectal cream Use a pea-sized amount.  Apply to the OUTER [place] of your anus topically 3 (three) times daily to anal area..  Do not insert into the anus. discard after 30 days 30 g 3    EVAMIST 1.53 mg/spray (1.7%) transdermal spray SPRAY 2 SPRAYS TO FOREARM DAILY      fluticasone (FLONASE) 50 mcg/actuation nasal spray 1 spray (50 mcg total) by Each Nare route once daily. 16 g 0    hydroCHLOROthiazide (HYDRODIURIL) 25 MG tablet Take 25 mg by mouth every morning.      LIDOcaine-prilocaine (EMLA) cream Apply topically as needed. 30 g 3    LORazepam (ATIVAN) 0.5 MG tablet Take 1 tablet (0.5 mg total) by mouth every 12 (twelve) hours as needed for Anxiety. 5 tablet 0    niacin 50 MG Tab niacin      niacin 50 MG Tab       ondansetron (ZOFRAN-ODT) 8 MG TbDL Take 1 tablet (8 mg total) by mouth every 6 (six) hours as needed (chemotherapy induced nausea/vomiting). 60 tablet 4    oxyCODONE (ROXICODONE) 10 mg Tab immediate release tablet Take 1 tablet (10 mg total) by mouth every 4 (four) hours as needed. 180 tablet 0    phenazopyridine (PYRIDIUM) 200 MG tablet Take 1 tablet (200 mg total) by mouth 3 (three) times daily with meals. 30 tablet 2    polyethylene glycol (GLYCOLAX) 17 gram/dose powder Take 17 g by mouth once daily. 510 g 2    rosuvastatin (CRESTOR) 10 MG tablet Take by mouth every evening.       No current facility-administered medications on file prior to visit.

## 2025-06-13 ENCOUNTER — OFFICE VISIT (OUTPATIENT)
Dept: HEMATOLOGY/ONCOLOGY | Facility: CLINIC | Age: 50
End: 2025-06-13
Payer: COMMERCIAL

## 2025-06-13 VITALS
DIASTOLIC BLOOD PRESSURE: 79 MMHG | WEIGHT: 213.63 LBS | TEMPERATURE: 98 F | OXYGEN SATURATION: 96 % | RESPIRATION RATE: 18 BRPM | HEART RATE: 75 BPM | HEIGHT: 67 IN | BODY MASS INDEX: 33.53 KG/M2 | SYSTOLIC BLOOD PRESSURE: 112 MMHG

## 2025-06-13 DIAGNOSIS — F41.1 GENERALIZED ANXIETY DISORDER: ICD-10-CM

## 2025-06-13 DIAGNOSIS — D49.9 IMMUNODEFICIENCY SECONDARY TO NEOPLASM: ICD-10-CM

## 2025-06-13 DIAGNOSIS — N20.0 NEPHROLITHIASIS: ICD-10-CM

## 2025-06-13 DIAGNOSIS — K58.1 IRRITABLE BOWEL SYNDROME WITH CONSTIPATION: ICD-10-CM

## 2025-06-13 DIAGNOSIS — I10 HYPERTENSION, UNSPECIFIED TYPE: ICD-10-CM

## 2025-06-13 DIAGNOSIS — E78.5 HYPERLIPIDEMIA, UNSPECIFIED HYPERLIPIDEMIA TYPE: ICD-10-CM

## 2025-06-13 DIAGNOSIS — C20 RECTAL CANCER: Primary | ICD-10-CM

## 2025-06-13 DIAGNOSIS — D84.81 IMMUNODEFICIENCY SECONDARY TO NEOPLASM: ICD-10-CM

## 2025-06-13 DIAGNOSIS — J30.9 ALLERGIC RHINITIS, UNSPECIFIED SEASONALITY, UNSPECIFIED TRIGGER: ICD-10-CM

## 2025-06-13 DIAGNOSIS — C78.02 MALIGNANT NEOPLASM METASTATIC TO LEFT LUNG: ICD-10-CM

## 2025-06-13 DIAGNOSIS — D50.9 MICROCYTIC ANEMIA: ICD-10-CM

## 2025-06-13 PROCEDURE — 99999 PR PBB SHADOW E&M-EST. PATIENT-LVL IV: CPT | Mod: PBBFAC,,, | Performed by: INTERNAL MEDICINE

## 2025-06-13 RX ORDER — SODIUM CHLORIDE 0.9 % (FLUSH) 0.9 %
10 SYRINGE (ML) INJECTION
OUTPATIENT
Start: 2025-06-20

## 2025-06-13 RX ORDER — HEPARIN 100 UNIT/ML
500 SYRINGE INTRAVENOUS
OUTPATIENT
Start: 2025-06-20

## 2025-06-13 NOTE — PROGRESS NOTES
MEDICAL ONCOLOGY - ESTABLISHED PATIENT VISIT    Reason for visit: rectal cancer     Best Contact Phone Number(s): [phone] (home)      Cancer/Stage/TNM:    Cancer Staging   Rectal adenocarcinoma  Staging form: Colon and Rectum, AJCC 8th Edition  - Clinical stage from 6/21/2024: Stage NAVID (cT2, cN2b, cM1a) - Signed by Dion Rudolph MD on 7/24/2024  - Pathologic: No stage assigned - Unsigned       Oncology History   Rectal adenocarcinoma   6/21/2024 Procedure    Colonoscopy - mild diverticular disease, mass in distal rectum (Complete)     6/21/2024 Cancer Staged    Staging form: Colon and Rectum, AJCC 8th Edition  - Clinical stage from 6/21/2024: Stage NAVID (cT2, cN2b, cM1a)     6/24/2024 Initial Diagnosis    Rectal adenocarcinoma  ERROL  - Colonoscopy 6/21/24: infiltrative, ulcerated, sub-mucosal, fungating and villous bleeding mass found in distal rectum, 6-7cm from the anus proximally.   - Distal rectal mass biopsy 6/21/24: well differentiated adenocarcinoma, negative for LVI and perineural invasion. IHC MSI-H low probability with intact nuclear expressions of MLH1, MSH2, MSH6, and PMS2.        7/15/2024 Imaging Significant Findings    MRI Rectal cancer  Low rectal tumor invading the internal sphincter in close approximation to the mesorectal fascia without definite involvement.  Multiple suspicious mesorectal and superior rectal nodes.     *MR STAGE     T: T1/T2 (tumor confined to rectal wall)     N: N+ (short axis ? 9 mm)     Blayne statement: Multiple suspicious mesorectal and superior rectal nodes.     *MRF: threatened (tumor margin within 1-2 mm of MRF) noting this is a low rectal tumor.  No definite evidence of invasion.     Sphincter Involvement: Yes involvement the internal sphincter     Suspicious Extramesorectal Lymph Nodes: No     EMVI: No     7/16/2024 Tumor Markers    Patient's tumor was tested for the following markers: CEA                                              Results of the tumor  marker test revealed 2.9     7/17/2024 Imaging Significant Findings    CT CAP  In this patient with a known history of rectal malignancy, there are few borderline prominent lymph nodes adjacent to the rectum measuring up to 8 mm.  There are solid nodules in both lungs, the largest in the lingula measuring up to 1 cm.  Given the patient's primary malignancy, pulmonary metastatic disease cannot be excluded.  Follow-up based upon oncologic guidance.     Prominent soft tissue density in the anterior superior mediastinum which could possibly represent residual thymic tissue with a focal solid structure measuring up to 1.0 x 1.7 cm, possibly an enlarged lymph node versus other soft tissue mass.     Indeterminate focal regions of sclerosis within the T10 vertebra and right sacrum.  Osseous metastatic disease cannot be excluded.     Cholelithiasis without CT evidence for cholecystitis.     Large hiatal hernia.  Diverticulosis coli without diverticulitis.     Large left UPJ stone measuring up to 1.2 cm without hydronephrosis or hydroureter.     7/19/2024 Imaging Significant Findings    PET CT  Hypermetabolic soft tissue thickening along the rectal wall compatible with known malignancy.  There are a few prominent perirectal mesorectal lymph nodes without significant hypermetabolic activity, please note some may be too small for characterization by PET.     0.9 cm lingular pulmonary nodule with mild hypermetabolic activity and prominent hypermetabolic mediastinal lymph node, concerning for metastatic foci.     8/12/2024 -  Chemotherapy    Treatment Summary   Plan Name: OP GI mFOLFOX6 (oxaliplatin leucovorin fluorouracil) with bevacizumab Q2W  Treatment Goal: Palliative  Status: Active  Start Date: 8/12/2024  End Date: 8/28/2025 (Planned)  Provider: Dion Rudolph MD  Chemotherapy: oxaliplatin (ELOXATIN) 85 mg/m2 = 194 mg in D5W 603.8 mL chemo infusion, 85 mg/m2 = 194 mg, Intravenous, Clinic/HOD 1 time, 4 of 4  cycles  Administration: 194 mg (8/12/2024), 194 mg (8/28/2024), 194 mg (9/12/2024), 194 mg (9/25/2024)  fluorouracil (Adrucil) 2,400 mg/m2 = 5,470 mg in 0.9% NaCl 240 mL chemo infusion, 2,400 mg/m2 = 5,470 mg, Intravenous, Clinic/HOD 1 time, 11 of 24 cycles  Administration: 5,470 mg (8/12/2024), 5,470 mg (8/28/2024), 5,470 mg (9/12/2024), 5,470 mg (9/25/2024), 5,470 mg (10/23/2024), 5,470 mg (11/6/2024), 5,470 mg (11/20/2024), 5,470 mg (12/4/2024), 5,470 mg (1/28/2025), 5,470 mg (2/11/2025), 5,470 mg (2/25/2025)  bevacizumab-awwb (MVASI) 500 mg in 0.9% NaCl 100 mL infusion, 550 mg, Intravenous, Clinic/HOD 1 time, 10 of 23 cycles  Administration: 500 mg (8/12/2024), 500 mg (8/28/2024), 500 mg (9/12/2024), 500 mg (9/25/2024), 500 mg (10/23/2024), 500 mg (11/6/2024), 500 mg (11/20/2024), 500 mg (1/28/2025), 500 mg (2/11/2025), 500 mg (2/25/2025)     11/14/2024 Genetic Testing    [facility]Next +RNA: Negative     12/18/2024 - 12/24/2024 Radiation Therapy    Treating physician: Kevin Lopez  Course: C1 Pelvis 2024  Treatment Site Ref. ID Energy Dose/Fx (Gy) #Fx Dose Correction (Gy) Total Dose (Gy) Start Date End Date Elapsed Days   IM Rectum PTV_High 6X 5 5 / 5 0 25 12/18/2024 12/24/2024 6        1/24/2025 Imaging Significant Findings    CT CAP  No detrimental change.  Unchanged pulmonary nodules and anterior mediastinal nodule.  Continued attention on follow-up recommended     Cholelithiasis.     3/6/2025 Imaging Significant Findings    MRI Rectal cancer  Compared to 10/31/2024, post treatment primary tumor assessment: Probable Incomplete response (likely residual tumor).     The previous tumor in the low rectum has decreased in size, and no longer demonstrates sphincter invasion.  There is a small focus of questionable diffusion restriction involving the left lateral wall of the low rectum, which may be artifactual but could also represent residual tumor.     *NODES     Suspicious Mesorectal Lymph nodes: No      Suspicious Extramesorectal Lymph nodes: No     3/6/2025 Imaging Significant Findings    MRI Rectal cancer  *TUMOR     Compared to 10/31/2024, post treatment primary tumor assessment: Probable Incomplete response (likely residual tumor).     The previous tumor in the low rectum has decreased in size, and no longer demonstrates sphincter invasion.  There is a small focus of questionable diffusion restriction involving the left lateral wall of the low rectum, which may be artifactual but could also represent residual tumor.     *NODES     Suspicious Mesorectal Lymph nodes: No     Suspicious Extramesorectal Lymph nodes: No     3/11/2025 Imaging Significant Findings    PET CT  In this patient with history of rectal adenocarcinoma, decreased focal soft tissue thickening and tracer avidity of the left rectal wall. Additional decreased tracer uptake of the lingular pulmonary nodule and mediastinal/pelvic lymph nodes. Findings suggestive of favorable treatment response. No new hypermetabolic tumor.      4/14/2025 - 5/5/2025 Radiation Therapy    Treating physician: nohemi ricks    Course: C2 Chest 2025    Treatment Site Ref. ID Energy Dose/Fx (Gy) #Fx Dose Correction (Gy) Total Dose (Gy) Start Date End Date Elapsed Days   SB_Lingula iGTV_Lingula 6X 4 15 / 15 0 60 4/14/2025 5/5/2025 21   SBPrevascular iGTV_prevasc 6X 4 15 / 15 0 60 4/14/2025 5/5/2025 21          5/8/2025 Procedure    Colonoscopy     6/3/2025 Imaging Significant Findings    CT CAP  A 5 mm irregular nodule within the lingula is without significant change.  A 3 mm nodule the right middle lobe is also unchanged.  No new pulmonary nodules.  No pleural effusion.     No focal hepatic lesion.  A 2.6 cm gallstone is present no biliary dilatation.  The pancreas, spleen, adrenal glands, and kidneys are unremarkable.     The small bowel is normal caliber appearance.  The appendix is not seen.  There is mild sigmoid diverticulosis.     There is no abdominopelvic  lymphadenopathy.     6/4/2025 Tumor Markers    Patient's tumor was tested for the following markers: CEA  Results of the tumor marker test revealed <1.7         Interval History:   Ms. Segura returns for follow up while on treatment break. She received 11 cycles of 5-FU + cherelle maintenance with her last dose being 2/25/25.  She received radiation (60 Gy) to 2 thoracic metastases - 1 prevascular LN and 1 lingular nodule.  Completed this 5/5/25.  MRI 5/25/25 showed no further diffusion restriction.  Appears consistent with complete/near complete response.  CT without evidence of progression.    She is feeling well physically.  Energy is good, no pain.  She is having normal bowel movements.    Patient presents to clinic with her daughter. ECOG PS is 0.    ROS:   Review of Systems   Constitutional:  Negative for chills, fever and weight loss.   HENT:  Negative for hearing loss.    Eyes:  Negative for blurred vision.   Respiratory:  Negative for cough, hemoptysis and shortness of breath.    Cardiovascular:  Negative for chest pain.   Gastrointestinal:  Negative for abdominal pain, blood in stool, constipation, diarrhea, nausea and vomiting.   Genitourinary:  Negative for dysuria, frequency and urgency.   Musculoskeletal:  Negative for back pain and myalgias.   Skin:  Negative for rash.   Neurological:  Negative for dizziness, tingling, weakness and headaches.   Endo/Heme/Allergies:  Does not bruise/bleed easily.   Psychiatric/Behavioral:  Negative for depression. The patient is not nervous/anxious.      Past Medical History:   Past Medical History:   Diagnosis Date    Anxiety disorder, unspecified     Fibroids     HTN (hypertension)     Sinus problem         Past Surgical History:   Past Surgical History:   Procedure Laterality Date    BILATERAL SALPINGO-OOPHORECTOMY (BSO) Bilateral 12/9/2020    Procedure: SALPINGO-OOPHORECTOMY, BILATERAL;  Surgeon: Yun Recio MD;  Location: [facility];  Service: OB/GYN;  Laterality:  "Bilateral;  COVID NEGATIVE 12/3    INCISION OF UVULA      INSERTION OF TUNNELED CENTRAL VENOUS CATHETER (CVC) WITH SUBCUTANEOUS PORT Right 7/29/2024    Procedure: INSERTION, SINGLE LUMEN CATHETER WITH PORT, WITH FLUOROSCOPIC GUIDANCE;  Surgeon: Russel Layton MD;  Location: [facility] OR [address] [place] [address];  Service: General;  Laterality: Right;    knee arhroscopy      NASAL SEPTUM SURGERY      SINUS SURGERY      TONSILLECTOMY      TOTAL ABDOMINAL HYSTERECTOMY N/A 12/9/2020    Procedure: HYSTERECTOMY, TOTAL, ABDOMINAL;  Surgeon: Yun Recio MD;  Location: [facility] OR;  Service: OB/GYN;  Laterality: N/A;    UTERINE FIBROID SURGERY          Family History:   Family History   Problem Relation Name Age of Onset    Asthma Mother      Hypertension Mother      COPD Father      Diabetes Father      Hypertension Father      Esophageal cancer Maternal Uncle Jossue     Colon polyps Maternal Grandfather          "every colonoscopy"    Bone cancer Paternal Grandmother      Kidney cancer Maternal Aunt Sherlyn 59        kidney removal    Skin cancer Maternal Uncle Tim         possibly melanoma  recurrent    Throat cancer Maternal Uncle Medardo         late 20s - early 30s    Lung cancer Maternal Uncle Maksim         squamous cell  smoke exposure    Leukemia Maternal Uncle Kendall     Colon cancer Neg Hx          Social History:   Social History     Tobacco Use    Smoking status: Never    Smokeless tobacco: Never   Substance Use Topics    Alcohol use: Yes     Comment: Socially        I have reviewed and updated the patient's past medical, surgical, family and social histories.    Allergies:   Review of patient's allergies indicates:   Allergen Reactions    Cat's claw Other (See Comments)        Medications:   Current Outpatient Medications   Medication Sig Dispense Refill    cetirizine (ZYRTEC) 10 MG tablet Take 10 mg by mouth daily as needed.       cholecalciferol, vitamin D3, 5,000 unit Tab Take 5,000 Units by mouth once daily.      " "citalopram (CELEXA) 20 MG tablet Take 20 mg by mouth every morning.      diltiazem 2% rectal cream Use a pea-sized amount.  Apply to the OUTER [place] of your anus topically 3 (three) times daily to anal area..  Do not insert into the anus. discard after 30 days 30 g 3    EVAMIST 1.53 mg/spray (1.7%) transdermal spray SPRAY 2 SPRAYS TO FOREARM DAILY      fluticasone (FLONASE) 50 mcg/actuation nasal spray 1 spray (50 mcg total) by Each Nare route once daily. 16 g 0    hydroCHLOROthiazide (HYDRODIURIL) 25 MG tablet Take 25 mg by mouth every morning.      LIDOcaine-prilocaine (EMLA) cream Apply topically as needed. 30 g 3    LORazepam (ATIVAN) 0.5 MG tablet Take 1 tablet (0.5 mg total) by mouth every 12 (twelve) hours as needed for Anxiety. 5 tablet 0    niacin 50 MG Tab       ondansetron (ZOFRAN-ODT) 8 MG TbDL Take 1 tablet (8 mg total) by mouth every 6 (six) hours as needed (chemotherapy induced nausea/vomiting). 60 tablet 4    oxyCODONE (ROXICODONE) 10 mg Tab immediate release tablet Take 1 tablet (10 mg total) by mouth every 4 (four) hours as needed. 180 tablet 0    phenazopyridine (PYRIDIUM) 200 MG tablet Take 1 tablet (200 mg total) by mouth 3 (three) times daily with meals. 30 tablet 2    polyethylene glycol (GLYCOLAX) 17 gram/dose powder Take 17 g by mouth once daily. 510 g 2    rosuvastatin (CRESTOR) 10 MG tablet Take by mouth every evening.      niacin 50 MG Tab niacin       No current facility-administered medications for this visit.      Physical Exam:   /79 (BP Location: Right arm, Patient Position: Sitting)   Pulse 75   Temp 97.8 °F (36.6 °C) (Oral)   Resp 18   Ht 5' 7" (1.702 m)   Wt 96.9 kg (213 lb 10 oz)   LMP 11/18/2020   SpO2 96%   BMI 33.46 kg/m²          Physical Exam  Vitals reviewed.   Constitutional:       General: She is not in acute distress.     Appearance: Normal appearance. She is well-developed and normal weight. She is not ill-appearing, toxic-appearing or diaphoretic. "   HENT:      Head: Normocephalic and atraumatic.      Right Ear: External ear normal.      Left Ear: External ear normal.   Eyes:      General: No scleral icterus.     Extraocular Movements: Extraocular movements intact.      Conjunctiva/sclera: Conjunctivae normal.   Cardiovascular:      Rate and Rhythm: Normal rate.   Pulmonary:      Effort: Pulmonary effort is normal. No respiratory distress.   Abdominal:      Tenderness: There is no abdominal tenderness.   Musculoskeletal:         General: No swelling. Normal range of motion.   Skin:     General: Skin is warm.      Coloration: Skin is not jaundiced.      Findings: No erythema or rash.   Neurological:      General: No focal deficit present.      Mental Status: She is alert and oriented to person, place, and time. Mental status is at baseline.      Gait: Gait is intact. Gait normal.   Psychiatric:         Mood and Affect: Mood normal.         Behavior: Behavior normal.         Labs:   No results found for this or any previous visit (from the past 48 hours).    Imaging:      CT CAP 6/3/25:    Impression:     Unchanged small pulmonary nodules.  Continued attention on follow-up recommended.     Cholelithiasis.    MRI 5/25/25:  Impression:     *TUMOR     Compared toMRI 03/06/2025, post treatment primary tumor assessment: Possible complete/near complete response     Interval resolution of questionable diffusion restriction at the left lateral lower rectal wall.     *NODES     Suspicious Mesorectal Lymph nodes: No     Suspicious Extramesorectal Lymph nodes: No    Path:   Distal rectal mass biopsy 6/21/24: well differentiated adenocarcinoma, negative for LVI and perineural invasion. IHC MSI-H low probability with intact nuclear expressions of MLH1, MSH2, MSH6, and PMS2.       Assessment:       1. Rectal cancer    2. Malignant neoplasm metastatic to left lung    3. Immunodeficiency secondary to neoplasm    4. Microcytic anemia    5. Nephrolithiasis    6. Generalized  anxiety disorder    7. Hypertension, unspecified type    8. Hyperlipidemia, unspecified hyperlipidemia type    9. Allergic rhinitis, unspecified seasonality, unspecified trigger    10. Irritable bowel syndrome with constipation         Plan:         # Rectal adenocarcinoma  Imelda Kimble is a 49 y.o. woman with distal rectal G1 adenocarcinoma pMMR, 5.6 cm long, below the anterior peritoneal reflection, MRF threatened, with involvement the internal sphincter, EMVI-, with multiple (8+) small suspicious mesorectal and superior rectal nodes.     She was in her usual health and went to PCP for annual screening in 2024. PCP recommended screening colonoscopy as she is >45. 2 weeks prior to her screening colonoscopy, she noticed hematochezia and rectal pain. Her only symptom prior to this was constipation, which is lifelong. Found to have distal rectal tumor as above.    PET/CT 7/19/24 showed hypermetabolism of the primary rectal tumor with small mesorectal lymph nodes.  Additional 9mm L lung nodule and mediastinal lymph node were hypermetabolic.    We discussed her at tumor board with recommendations for induction chemotherapy and then potential local therapy (chemoradiation) to rectum and SBRT/surgery to lung.    Had port placed on 7/29/24    Received cycle 1 FOLFOX + Avastin on 8/12/24, then moved to maintenance 5-FU plus avastin. Last dose of Oxaliplatin was given on 9/25/2024  Repeat imaging after cycle 4 shows good response in mesorectal lymph nodes and L lingular lung metastasis.  Had restaging MRI of pelvis rectum that displays very good response to treatment.   Received 8 cycles of chemo, 5-FU only with last cycle.     Completed short course radiation on 12/24/24.   CT CAP 1/24/25 shows stable disease.    Discussed options of taking treatment break given low volume burden vs restarting maintenance 5-FU + cherelle vs chemo + consideration of consolidative local therapies to lung met and anterior mediastinal lymph node.  Will discuss these options further after the review of the PET/CT.     After cycle 11 of 5-FU + Avastin, last delivered 2/25/25 we proceed with radiation to her lung disease.  She received radiation (60 Gy) to 2 thoracic metastases - 1 prevascular LN and 1 lingular nodule.  Completed this 5/5/25.  MRI 5/25/25 showed no further diffusion restriction.  Appears consistent with complete/near complete response.  CT without evidence of progression.    Discussed with Dr. Menendez and given her excellent radiographic response we will hold off on surgical resection (APR) for now and continue monitoring alone.    RTC in 3 months with repeat scope with Shon, CT CAP and MRI rectal cancer protocol.  Needs port flush in next couple weeks and then in 3 months.    Tempus xT: APC, TP53 mutations,ERROL, TMB 4.7 m/MB.      # Microcytic anemia, nephrolithiasis, UTI  Suspect MICHELLE. Counts normalized today. Monitor.    Doing better. Ureteral stent was removed on 10/28/2024.     UTI resolved.     Other chronic disease managed by PCP   # Generalized anxiety disorder - continue citalopram   # HTN - HCTZ.  BP normal.  # HLD - statin  # allergic rhinitis - zyrtec and flonase, stable   # IBS-Constipation - Stable    Follow up: 3 months    Patient is in agreement with the proposed treatment plan. All questions were answered to the patient's satisfaction. Pt knows to call clinic if anything is needed before the next clinic visit.    Dion Rudolph MD  Hematology/Oncology  [facility]           code applied: patient requires or will require a continuous, longitudinal, and active collaborative plan of care related to this patient's health condition, metastatic rectal cancer --the management of which requires the direction of a practitioner with specialized clinical knowledge, skill, and expertise.     Route Chart for Scheduling    Med Onc Chart Routing      Follow up with physician 3 months. on 9/26/25 during CICT  schedule 11 or 11:30 am with port flush after   Follow up with LEIDA    Infusion scheduling note    Injection scheduling note needs port flush at [facility] in next 1-2 weeks   Labs    Imaging    Pharmacy appointment    Other referrals            Treatment Plan Information   OP GI mFOLFOX6 (oxaliplatin leucovorin fluorouracil) with bevacizumab Q2W Dion Rudolph MD   Associated diagnosis: Rectal adenocarcinoma Stage NAVID cT2, cN2b, cM1a noted on 7/15/2024   Line of treatment: First Line  Treatment Goal: Palliative     Upcoming Treatment Dates - OP GI mFOLFOX6 (oxaliplatin leucovorin fluorouracil) with bevacizumab Q2W    3/11/2025       Chemotherapy       bevacizumab-awwb (MVASI) 5 mg/kg = 550 mg in 0.9% NaCl 100 mL infusion       fluorouracil (Adrucil) 2,400 mg/m2 = 5,470 mg in 0.9% NaCl 240 mL chemo infusion       Antiemetics       palonosetron (ALOXI) 0.25 mg with Dexamethasone (DECADRON) 12 mg in NS 50 mL IVPB  3/25/2025       Chemotherapy       bevacizumab-awwb (MVASI) 5 mg/kg = 550 mg in 0.9% NaCl 100 mL infusion       fluorouracil (Adrucil) 2,400 mg/m2 = 5,470 mg in 0.9% NaCl 240 mL chemo infusion       Antiemetics       palonosetron (ALOXI) 0.25 mg with Dexamethasone (DECADRON) 12 mg in NS 50 mL IVPB  4/8/2025       Chemotherapy       bevacizumab-awwb (MVASI) 5 mg/kg = 550 mg in 0.9% NaCl 100 mL infusion       fluorouracil (Adrucil) 2,400 mg/m2 = 5,470 mg in 0.9% NaCl 240 mL chemo infusion       Antiemetics       palonosetron (ALOXI) 0.25 mg with Dexamethasone (DECADRON) 12 mg in NS 50 mL IVPB  4/22/2025       Chemotherapy       bevacizumab-awwb (MVASI) 5 mg/kg = 550 mg in 0.9% NaCl 100 mL infusion       fluorouracil (Adrucil) 2,400 mg/m2 = 5,470 mg in 0.9% NaCl 240 mL chemo infusion       Antiemetics       palonosetron (ALOXI) 0.25 mg with Dexamethasone (DECADRON) 12 mg in NS 50 mL IVPB    Therapy Plan Information  PORT FLUSH for Rectal adenocarcinoma, noted on 7/15/2024  Flushes  heparin,  porcine (PF) 100 unit/mL injection flush 500 Units  500 Units, Intravenous, Every visit  sodium chloride 0.9% flush 10 mL  10 mL, Intravenous, Every visit      No therapy plan of the specified type found.    No therapy plan of the specified type found.

## 2025-06-18 ENCOUNTER — PATIENT MESSAGE (OUTPATIENT)
Dept: SURGERY | Facility: CLINIC | Age: 50
End: 2025-06-18
Payer: COMMERCIAL

## (undated) DEVICE — DECANTER FLUID TRNSF WHITE 9IN

## (undated) DEVICE — DRAPE C-ARM ELAS CLIP 42X120IN

## (undated) DEVICE — NDL HYPO REG 25G X 1 1/2

## (undated) DEVICE — ELECTRODE REM PLYHSV RETURN 9

## (undated) DEVICE — BLADE SURG CARBON STEEL SZ11

## (undated) DEVICE — SUT PROLENE 2-0 30 SH

## (undated) DEVICE — SYR ONLY LUER LOCK 20CC

## (undated) DEVICE — DRESSING ANTIMICROBIAL 1 INCH

## (undated) DEVICE — SUT VICRYL 3-0 27 SH

## (undated) DEVICE — CONTAINER SPECIMEN OR STER 4OZ

## (undated) DEVICE — ADHESIVE DERMABOND ADVANCED

## (undated) DEVICE — SOL NACL 0.9% INJ PF/50151

## (undated) DEVICE — TIP YANKAUERS BULB NO VENT

## (undated) DEVICE — DRAPE T THYROID STERILE

## (undated) DEVICE — APPLICATOR CHLORAPREP ORN 26ML

## (undated) DEVICE — SUT MCRYL PLUS 4-0 PS2 27IN

## (undated) DEVICE — SYR DISP LL 5CC

## (undated) DEVICE — TRAY MINOR GEN SURG OMC

## (undated) DEVICE — SUT VICRYL CTD 2-0 GI 27 SH